# Patient Record
Sex: FEMALE | Race: WHITE | NOT HISPANIC OR LATINO | Employment: UNEMPLOYED | URBAN - NONMETROPOLITAN AREA
[De-identification: names, ages, dates, MRNs, and addresses within clinical notes are randomized per-mention and may not be internally consistent; named-entity substitution may affect disease eponyms.]

---

## 2017-01-19 ENCOUNTER — OFFICE VISIT (OUTPATIENT)
Dept: CARDIOLOGY | Facility: CLINIC | Age: 42
End: 2017-01-19

## 2017-01-19 VITALS
HEIGHT: 61 IN | OXYGEN SATURATION: 94 % | SYSTOLIC BLOOD PRESSURE: 143 MMHG | HEART RATE: 68 BPM | BODY MASS INDEX: 34.93 KG/M2 | DIASTOLIC BLOOD PRESSURE: 79 MMHG | WEIGHT: 185 LBS

## 2017-01-19 DIAGNOSIS — Z76.89 ENCOUNTER TO ESTABLISH CARE: ICD-10-CM

## 2017-01-19 DIAGNOSIS — I21.9 MYOCARDIAL INFARCTION IN RECOVERY PHASE (HCC): Primary | ICD-10-CM

## 2017-01-19 DIAGNOSIS — E78.2 MIXED HYPERLIPIDEMIA: ICD-10-CM

## 2017-01-19 DIAGNOSIS — E66.9 OBESITY (BMI 30-39.9): ICD-10-CM

## 2017-01-19 DIAGNOSIS — Z72.0 TOBACCO ABUSE: ICD-10-CM

## 2017-01-19 DIAGNOSIS — I25.10 CORONARY ARTERY DISEASE INVOLVING NATIVE CORONARY ARTERY OF NATIVE HEART WITHOUT ANGINA PECTORIS: ICD-10-CM

## 2017-01-19 DIAGNOSIS — R06.09 DYSPNEA ON EXERTION: ICD-10-CM

## 2017-01-19 PROCEDURE — 99214 OFFICE O/P EST MOD 30 MIN: CPT | Performed by: INTERNAL MEDICINE

## 2017-01-19 NOTE — MR AVS SNAPSHOT
Ekaterina May   1/19/2017 2:00 PM   Office Visit    Dept Phone:  [phone]   Encounter #:  [id number]    Provider:  Scott Aponte MD   Department:  [facility] CARDIOLOGY                Your Full Care Plan              Today's Medication Changes          These changes are accurate as of: 1/19/17  2:38 PM.  If you have any questions, ask your nurse or doctor.               Stop taking medication(s)listed here:     nicotine 14 MG/24HR patch   Commonly known as:  NICODERM CQ   Stopped by:  Scott Aponte MD           nicotine 21 MG/24HR patch   Commonly known as:  NICODERM CQ   Stopped by:  Scott Aponte MD           nicotine 7 MG/24HR patch   Commonly known as:  NICODERM CQ   Stopped by:  Scott Aponte MD           NICOTINE STEP 1 21 MG/24HR patch   Generic drug:  nicotine   Stopped by:  Scott Aponte MD                      Your Updated Medication List          This list is accurate as of: 1/19/17  2:38 PM.  Always use your most recent med list.                aspirin 325 MG EC tablet   Take 1 tablet by mouth Daily.       clopidogrel 75 MG tablet   Commonly known as:  PLAVIX   Take 1 tablet by mouth Daily.       lisinopril 2.5 MG tablet   Commonly known as:  PRINIVIL,ZESTRIL   Take 1 tablet by mouth Daily.       metoprolol succinate XL 25 MG 24 hr tablet   Commonly known as:  TOPROL-XL   Take 0.5 tablets by mouth Daily.       omeprazole 40 MG capsule   Commonly known as:  priLOSEC   Take 1 capsule by mouth Daily.               We Performed the Following     Ambulatory Referral to Cardiac Rehab       You Were Diagnosed With        Codes Comments    Myocardial infarction in recovery phase    -  Primary ICD-10-CM: I21.3  ICD-9-CM: 410.92     Dyspnea on exertion     ICD-10-CM: R06.09  ICD-9-CM: 786.09     Mixed hyperlipidemia     ICD-10-CM: E78.2  ICD-9-CM: 272.2       Instructions     None    Patient Instructions History      Upcoming Appointments     Visit  "Type Date Time Department    FOLLOW UP 1/19/2017  2:00 PM [facility]    FOLLOW UP 2/22/2017  3:00 PM [facility]      MyChart Signup     Our records indicate that you have an active [facility] account.    You can view your After Visit Summary by going to Thanx and logging in with your [employer] username and password.  If you don't have a [employer] username and password but a parent or guardian has access to your record, the parent or guardian should login with their own [employer] username and password and access your record to view the After Visit Summary.    If you have questions, you can email [email or web address] or call [phone] to talk to our [employer] staff.  Remember, [employer] is NOT to be used for urgent needs.  For medical emergencies, dial 911.               Other Info from Your Visit           Your Appointments     Feb 22, 2017  3:00 PM EST   Follow Up with Scott Aponte MD   [facility] (--)    [address]  [facility] [address] [phone]           Arrive 15 minutes prior to appointment.              Allergies     Codeine  Shortness Of Breath      Reason for Visit     Follow-up     Coronary Artery Disease           Vital Signs     Blood Pressure Pulse Height Weight Oxygen Saturation Body Mass Index    143/79 (BP Location: Right arm, Patient Position: Sitting) 68 61\" (154.9 cm) 185 lb (83.9 kg) 94% 34.96 kg/m2    Smoking Status                   Former Smoker           Problems and Diagnoses Noted     Mixed hyperlipidemia    Myocardial infarction in recovery phase    -  Primary    Breathlessness on exertion            "

## 2017-01-19 NOTE — PROGRESS NOTES
Subjective   Ekaterina Verdin is a 41 y.o. female.     Chief Complaint   Patient presents with   • Follow-up   • Coronary Artery Disease       History of Present Illness     Mrs. Verdin is a pleasant 41-year-old woman who presents for follow-up of known coronary artery disease. She presented in September 2016 with an acute inferior wall myocardial infarction and was taken emergently to the cardiac catheterization laboratory and underwent drug-eluting stent implantation at that time.  She did have an echocardiogram performed during her hospitalization which was normal.  She states that more recently she's had difficulty with anxiety and panic attacks.  She states that she feels much more emotionally labile.  She notes that when she is emotional she does have some discomfort in the upper portion of her abdomen.  She states that there may be some radiation to her left arm.  She notes that when she walks she has no symptoms at all.  She also describes that she is more short of breath with activity.  She also notes orthopnea and states that when she takes her 's Lasix she feels better.  She has gained nearly 20 pounds since her initial presentation.  She denies any palpitations, near syncope or syncopal symptoms.  She notes that she has not had any periods since her hospitalization.  She does not see a primary care provider.      The following portions of the patient's history were reviewed and updated as appropriate: allergies, current medications, past family history, past medical history, past social history, past surgical history and problem list.    Review of Systems   Constitutional: Positive for appetite change and fatigue. Negative for activity change.   HENT: Negative for congestion and tinnitus.    Eyes: Negative for visual disturbance.   Respiratory: Positive for shortness of breath. Negative for cough and chest tightness.    Cardiovascular: Negative for chest pain, palpitations and leg swelling.    Gastrointestinal: Negative for blood in stool, nausea and vomiting.   Endocrine: Positive for cold intolerance. Negative for heat intolerance and polyuria.   Genitourinary: Negative for dysuria.   Musculoskeletal: Positive for myalgias. Negative for neck pain.   Skin: Negative for rash.   Neurological: Negative for dizziness, syncope, weakness and light-headedness.   Hematological: Does not bruise/bleed easily.   Psychiatric/Behavioral: Positive for sleep disturbance. Negative for confusion.       Objective   Physical Exam   Constitutional: She is oriented to person, place, and time. She appears well-developed and well-nourished. No distress.   HENT:   Head: Normocephalic and atraumatic.   Nose: Nose normal.   Mouth/Throat: Oropharynx is clear and moist.   Eyes: Conjunctivae and EOM are normal. Right eye exhibits no discharge. Left eye exhibits no discharge. No scleral icterus.   Neck: Normal range of motion. No hepatojugular reflux and no JVD present. Carotid bruit is not present. No tracheal deviation present.   Cardiovascular: Normal rate, regular rhythm, S1 normal, S2 normal and intact distal pulses.  PMI is not displaced.  Exam reveals no gallop, no S3, no S4 and no friction rub.    No murmur heard.  Pulmonary/Chest: Effort normal and breath sounds normal. No accessory muscle usage. No respiratory distress. She has no wheezes. She has no rales. She exhibits no tenderness.   Abdominal: Soft. Bowel sounds are normal. She exhibits no distension. There is no tenderness.   Musculoskeletal: Normal range of motion. She exhibits no edema or tenderness.       Vascular Status -  Her exam exhibits no right foot edema. Her exam exhibits no left foot edema.  Neurological: She is alert and oriented to person, place, and time. No cranial nerve deficit. Coordination normal.   Skin: Skin is warm and dry. She is not diaphoretic.   Nursing note and vitals reviewed.      Procedures    Assessment/Plan     Coronary Artery Disease.  I suspect that overall the patient's coronary artery disease is stable.  I suspect that most of her symptoms are due to depression post myocardial infarction.  Again, I have encouraged her to participate in cardiac rehabilitation as I suspect this will be most beneficial to her.  As noted below, I have asked her to obtain laboratory data and an echocardiogram.  If all of this is unremarkable and she continues to have symptoms I will obtain a stress test.      Dyspnea  In regard to her complaint of dyspnea, her symptoms are somewhat unclear.  Specifically although she does describe shortness of breath her exam was relatively unremarkable and her echocardiogram during her hospitalization was also normal.  Again, I've asked her to obtain a BNP and an echocardiogram.     Hyperlipidemia In regard to their history of hyperlipidemia, she is tolerating statin therapy without difficulty. I will obtain a follow-up lipid panel.     Tobacco.  In regard to their history of tobacco consumption, I have congratulated her on discontinuing tobacco products. I've asked her to remain tobacco free.     Obesity. I spent a long time discussing the patient's weight and relevance to their cardiac risk profile. I encouraged them to work on weight loss through aerobic exercise and diet. I have discussed diet options to include weight watchers and the Mediterranean diet etc. I encouraged them to achieve a BMI of less than 25.     In short, it is been a pleasure to participate in Mrs. Barcenas care, I look forward to seeing her again in one month.

## 2017-01-25 ENCOUNTER — APPOINTMENT (OUTPATIENT)
Dept: LAB | Facility: HOSPITAL | Age: 42
End: 2017-01-25
Attending: INTERNAL MEDICINE

## 2017-01-25 ENCOUNTER — HOSPITAL ENCOUNTER (OUTPATIENT)
Dept: CARDIOLOGY | Facility: HOSPITAL | Age: 42
Discharge: HOME | End: 2017-01-25
Attending: INTERNAL MEDICINE | Admitting: INTERNAL MEDICINE

## 2017-01-25 DIAGNOSIS — E78.2 MIXED HYPERLIPIDEMIA: ICD-10-CM

## 2017-01-25 DIAGNOSIS — R06.09 DYSPNEA ON EXERTION: ICD-10-CM

## 2017-01-25 DIAGNOSIS — I21.9 MYOCARDIAL INFARCTION IN RECOVERY PHASE (HCC): ICD-10-CM

## 2017-01-25 LAB
ALBUMIN SERPL-MCNC: 4.3 G/DL (ref 3.5–5)
ALBUMIN/GLOB SERPL: 1.5 G/DL (ref 1.5–2.5)
ALP SERPL-CCNC: 54 U/L (ref 46–116)
ALT SERPL W P-5'-P-CCNC: 42 U/L (ref 10–36)
ANION GAP SERPL CALCULATED.3IONS-SCNC: 4.9 MMOL/L (ref 3.6–11.2)
AST SERPL-CCNC: 31 U/L (ref 10–30)
BASOPHILS # BLD AUTO: 0.02 10*3/MM3 (ref 0–0.3)
BASOPHILS NFR BLD AUTO: 0.3 % (ref 0–2)
BH CV ECHO MEAS - % IVS THICK: 39.4 %
BH CV ECHO MEAS - % LVPW THICK: 31.3 %
BH CV ECHO MEAS - ACS: 1.4 CM
BH CV ECHO MEAS - AO ROOT AREA (BSA CORRECTED): 1.5
BH CV ECHO MEAS - AO ROOT AREA: 6.4 CM^2
BH CV ECHO MEAS - AO ROOT DIAM: 2.9 CM
BH CV ECHO MEAS - BSA(HAYCOCK): 2 M^2
BH CV ECHO MEAS - BSA: 1.8 M^2
BH CV ECHO MEAS - BZI_BMI: 33.8 KILOGRAMS/M^2
BH CV ECHO MEAS - BZI_METRIC_HEIGHT: 157.5 CM
BH CV ECHO MEAS - BZI_METRIC_WEIGHT: 83.9 KG
BH CV ECHO MEAS - EDV(CUBED): 85.2 ML
BH CV ECHO MEAS - EDV(TEICH): 87.7 ML
BH CV ECHO MEAS - EF(CUBED): 71.5 %
BH CV ECHO MEAS - EF(TEICH): 63.4 %
BH CV ECHO MEAS - ESV(CUBED): 24.3 ML
BH CV ECHO MEAS - ESV(TEICH): 32.1 ML
BH CV ECHO MEAS - FS: 34.2 %
BH CV ECHO MEAS - IVS/LVPW: 1
BH CV ECHO MEAS - IVSD: 1.2 CM
BH CV ECHO MEAS - IVSS: 1.7 CM
BH CV ECHO MEAS - LA DIMENSION: 3 CM
BH CV ECHO MEAS - LA/AO: 1.1
BH CV ECHO MEAS - LV MASS(C)D: 189.4 GRAMS
BH CV ECHO MEAS - LV MASS(C)DI: 102.4 GRAMS/M^2
BH CV ECHO MEAS - LV MASS(C)S: 171.4 GRAMS
BH CV ECHO MEAS - LV MASS(C)SI: 92.7 GRAMS/M^2
BH CV ECHO MEAS - LVIDD: 4.4 CM
BH CV ECHO MEAS - LVIDS: 2.9 CM
BH CV ECHO MEAS - LVOT AREA (M): 2.5 CM^2
BH CV ECHO MEAS - LVOT AREA: 2.6 CM^2
BH CV ECHO MEAS - LVOT DIAM: 1.8 CM
BH CV ECHO MEAS - LVPWD: 1.2 CM
BH CV ECHO MEAS - LVPWS: 1.5 CM
BH CV ECHO MEAS - MV A MAX VEL: 64.7 CM/SEC
BH CV ECHO MEAS - MV DEC SLOPE: 356.8 CM/SEC^2
BH CV ECHO MEAS - MV E MAX VEL: 84.4 CM/SEC
BH CV ECHO MEAS - MV E/A: 1.3
BH CV ECHO MEAS - MV P1/2T MAX VEL: 82.9 CM/SEC
BH CV ECHO MEAS - MV P1/2T: 68.1 MSEC
BH CV ECHO MEAS - MVA P1/2T LCG: 2.7 CM^2
BH CV ECHO MEAS - MVA(P1/2T): 3.2 CM^2
BH CV ECHO MEAS - RAP SYSTOLE: 10 MMHG
BH CV ECHO MEAS - RVSP: 27.3 MMHG
BH CV ECHO MEAS - SI(CUBED): 32.9 ML/M^2
BH CV ECHO MEAS - SI(TEICH): 30 ML/M^2
BH CV ECHO MEAS - SV(CUBED): 60.9 ML
BH CV ECHO MEAS - SV(TEICH): 55.6 ML
BH CV ECHO MEAS - TR MAX VEL: 207.7 CM/SEC
BILIRUB SERPL-MCNC: 0.3 MG/DL (ref 0.2–1.8)
BNP SERPL-MCNC: 50 PG/ML (ref 0–100)
BUN BLD-MCNC: 8 MG/DL (ref 7–21)
BUN/CREAT SERPL: 9.1 (ref 7–25)
CALCIUM SPEC-SCNC: 9.2 MG/DL (ref 7.7–10)
CHLORIDE SERPL-SCNC: 107 MMOL/L (ref 99–112)
CHOLEST SERPL-MCNC: 148 MG/DL (ref 0–200)
CO2 SERPL-SCNC: 30.1 MMOL/L (ref 24.3–31.9)
CREAT BLD-MCNC: 0.88 MG/DL (ref 0.43–1.29)
DEPRECATED RDW RBC AUTO: 47.7 FL (ref 37–54)
EOSINOPHIL # BLD AUTO: 0.11 10*3/MM3 (ref 0–0.7)
EOSINOPHIL NFR BLD AUTO: 1.5 % (ref 0–5)
ERYTHROCYTE [DISTWIDTH] IN BLOOD BY AUTOMATED COUNT: 13.9 % (ref 11.5–14.5)
GFR SERPL CREATININE-BSD FRML MDRD: 70 ML/MIN/1.73
GLOBULIN UR ELPH-MCNC: 2.8 GM/DL
GLUCOSE BLD-MCNC: 81 MG/DL (ref 70–110)
HCG SERPL QL: NEGATIVE
HCT VFR BLD AUTO: 39.4 % (ref 37–47)
HDLC SERPL-MCNC: 38 MG/DL (ref 60–100)
HGB BLD-MCNC: 13.2 G/DL (ref 12–16)
IMM GRANULOCYTES # BLD: 0.02 10*3/MM3 (ref 0–0.03)
IMM GRANULOCYTES NFR BLD: 0.3 % (ref 0–0.5)
LDLC SERPL CALC-MCNC: 74 MG/DL (ref 0–100)
LDLC/HDLC SERPL: 1.94 {RATIO}
LV EF 2D ECHO EST: 60 %
LYMPHOCYTES # BLD AUTO: 2.45 10*3/MM3 (ref 1–3)
LYMPHOCYTES NFR BLD AUTO: 33.3 % (ref 21–51)
MCH RBC QN AUTO: 31.5 PG (ref 27–33)
MCHC RBC AUTO-ENTMCNC: 33.5 G/DL (ref 33–37)
MCV RBC AUTO: 94 FL (ref 80–94)
MONOCYTES # BLD AUTO: 0.85 10*3/MM3 (ref 0.1–0.9)
MONOCYTES NFR BLD AUTO: 11.5 % (ref 0–10)
NEUTROPHILS # BLD AUTO: 3.91 10*3/MM3 (ref 1.4–6.5)
NEUTROPHILS NFR BLD AUTO: 53.1 % (ref 30–70)
OSMOLALITY SERPL CALC.SUM OF ELEC: 280.5 MOSM/KG (ref 273–305)
PLATELET # BLD AUTO: 275 10*3/MM3 (ref 130–400)
PMV BLD AUTO: 11.6 FL (ref 6–10)
POTASSIUM BLD-SCNC: 4 MMOL/L (ref 3.5–5.3)
PROT SERPL-MCNC: 7.1 G/DL (ref 6–8)
RBC # BLD AUTO: 4.19 10*6/MM3 (ref 4.2–5.4)
SODIUM BLD-SCNC: 142 MMOL/L (ref 135–153)
TRIGL SERPL-MCNC: 182 MG/DL (ref 0–150)
VLDLC SERPL-MCNC: 36.4 MG/DL
WBC NRBC COR # BLD: 7.36 10*3/MM3 (ref 4.5–12.5)

## 2017-01-25 PROCEDURE — 93306 TTE W/DOPPLER COMPLETE: CPT

## 2017-01-25 PROCEDURE — 36415 COLL VENOUS BLD VENIPUNCTURE: CPT | Performed by: INTERNAL MEDICINE

## 2017-01-25 PROCEDURE — 85025 COMPLETE CBC W/AUTO DIFF WBC: CPT | Performed by: INTERNAL MEDICINE

## 2017-01-25 PROCEDURE — 93306 TTE W/DOPPLER COMPLETE: CPT | Performed by: INTERNAL MEDICINE

## 2017-01-25 PROCEDURE — 80053 COMPREHEN METABOLIC PANEL: CPT | Performed by: INTERNAL MEDICINE

## 2017-01-25 PROCEDURE — 83880 ASSAY OF NATRIURETIC PEPTIDE: CPT | Performed by: INTERNAL MEDICINE

## 2017-01-25 PROCEDURE — 80061 LIPID PANEL: CPT | Performed by: INTERNAL MEDICINE

## 2017-01-25 PROCEDURE — 84703 CHORIONIC GONADOTROPIN ASSAY: CPT | Performed by: INTERNAL MEDICINE

## 2017-01-30 ENCOUNTER — TELEPHONE (OUTPATIENT)
Dept: CARDIOLOGY | Facility: CLINIC | Age: 42
End: 2017-01-30

## 2017-01-30 NOTE — TELEPHONE ENCOUNTER
----- Message from Scott Aponte MD sent at 1/26/2017  3:23 PM EST -----  Let her know her labs and cholesterol tests were OK.  COntinue current meds and work on weight loss    sdf      CALLED PT TO LET HER KNOW THAT PER DR. APONTE HER ECHO WAS OK, & THAT HER LABS AND CHOLESTEROL TEST WERE OK TO CONTINUE CURRENT MEDS AS IS & WORK ON WEIGHT LOSS.    PT AWARE & UNDERSTANDS.     CSMA

## 2017-02-01 ENCOUNTER — OFFICE VISIT (OUTPATIENT)
Dept: FAMILY MEDICINE CLINIC | Facility: CLINIC | Age: 42
End: 2017-02-01

## 2017-02-01 VITALS
HEART RATE: 90 BPM | OXYGEN SATURATION: 97 % | HEIGHT: 61 IN | BODY MASS INDEX: 34.93 KG/M2 | WEIGHT: 185 LBS | SYSTOLIC BLOOD PRESSURE: 130 MMHG | TEMPERATURE: 98.7 F | DIASTOLIC BLOOD PRESSURE: 60 MMHG

## 2017-02-01 DIAGNOSIS — H61.23 EXCESSIVE CERUMEN IN EAR CANAL, BILATERAL: ICD-10-CM

## 2017-02-01 DIAGNOSIS — E66.9 OBESITY (BMI 30-39.9): ICD-10-CM

## 2017-02-01 DIAGNOSIS — E28.2 PCOS (POLYCYSTIC OVARIAN SYNDROME): ICD-10-CM

## 2017-02-01 DIAGNOSIS — E78.2 MIXED HYPERLIPIDEMIA: ICD-10-CM

## 2017-02-01 DIAGNOSIS — I25.10 CORONARY ARTERY DISEASE INVOLVING NATIVE CORONARY ARTERY OF NATIVE HEART WITHOUT ANGINA PECTORIS: Primary | ICD-10-CM

## 2017-02-01 PROCEDURE — 69210 REMOVE IMPACTED EAR WAX UNI: CPT | Performed by: NURSE PRACTITIONER

## 2017-02-01 PROCEDURE — 99204 OFFICE O/P NEW MOD 45 MIN: CPT | Performed by: NURSE PRACTITIONER

## 2017-02-01 RX ORDER — FUROSEMIDE 20 MG/1
20 TABLET ORAL DAILY PRN
Qty: 30 TABLET | Refills: 5 | Status: SHIPPED | OUTPATIENT
Start: 2017-02-01 | End: 2017-06-15 | Stop reason: SDUPTHER

## 2017-02-01 RX ORDER — BUPROPION HYDROCHLORIDE 150 MG/1
150 TABLET ORAL DAILY
Qty: 30 TABLET | Refills: 3 | Status: SHIPPED | OUTPATIENT
Start: 2017-02-01 | End: 2017-05-30 | Stop reason: SDUPTHER

## 2017-02-01 RX ORDER — POTASSIUM CHLORIDE 750 MG/1
10 TABLET, EXTENDED RELEASE ORAL DAILY
Qty: 30 TABLET | Refills: 5 | Status: SHIPPED | OUTPATIENT
Start: 2017-02-01 | End: 2017-06-15 | Stop reason: SDUPTHER

## 2017-02-01 RX ORDER — ATORVASTATIN CALCIUM 80 MG/1
80 TABLET, FILM COATED ORAL DAILY
COMMUNITY
End: 2017-06-15 | Stop reason: SDUPTHER

## 2017-02-01 NOTE — PROGRESS NOTES
Subjective   Ekaterina Verdin is a 41 y.o. female.     History of Present Illness       History of MI  2016 with two stents. She is under the care of Dr. Aponte at [facility]. Recently stopped smoking. Weight gain after smoke cessation of 18 pounds.     Depression/Anxiety  Reports feeling different following her heart attack. Feels like she is always fearful of having another heart attack. At times she feels anxious and shaky.  with two adult children. Disabled. Her  is a local .   Feels as if part of herself  the day she had the heart attack and that she will never be the same person.     Hyperlipidemia  Triglycerides were over 500 prior to her heart attack. Recent labs in chart with great improvement.      Ref Range & Units 7d ago     Total Cholesterol 0 - 200 mg/dL 148   Triglycerides 0 - 150 mg/dL 182 (H)   HDL Cholesterol 60 - 100 mg/dL 38 (L)   LDL Cholesterol  0 - 100 mg/dL 74   VLDL Cholesterol mg/dL 36.4   LDL/HDL Ratio  1.94   Resulting Agency  [facility]     PCOS  History of pcos. Ablation several years ago. No menstrual flow since time of MI. No recent pap or gyn consult. Does have some male pattern hair growth.       The following portions of the patient's history were reviewed and updated as appropriate: allergies, current medications, past family history, past medical history, past social history, past surgical history and problem list.    Review of Systems   Constitutional: Negative for activity change, appetite change, chills, fatigue and fever.   HENT: Negative for ear pain, facial swelling, hearing loss, sinus pressure, sore throat, trouble swallowing and voice change.    Eyes: Negative for pain, discharge and visual disturbance.   Respiratory: Positive for shortness of breath (random times, resolves when she takes a lasix ). Negative for apnea, cough, choking, chest tightness and wheezing.    Cardiovascular: Positive for leg swelling (feet swell at  times, she has taken lasix of her husbands in the past which helps). Negative for chest pain and palpitations.   Gastrointestinal: Negative for abdominal pain, blood in stool, constipation, diarrhea, nausea and vomiting.        Gerd is stable at this time    Endocrine: Negative.    Genitourinary: Negative for dysuria.   Musculoskeletal: Negative for arthralgias and neck stiffness.   Skin: Negative for color change.   Allergic/Immunologic: Positive for environmental allergies. Negative for food allergies and immunocompromised state.   Neurological: Negative for headaches.   Hematological: Negative for adenopathy. Does not bruise/bleed easily.   Psychiatric/Behavioral: Positive for decreased concentration. Negative for confusion and suicidal ideas. The patient is nervous/anxious.    All other systems reviewed and are negative.      Objective   Physical Exam   Constitutional: She is oriented to person, place, and time. She appears well-developed and well-nourished. No distress.   HENT:   Head: Normocephalic.   Right Ear: Hearing, tympanic membrane and external ear normal.   Left Ear: Hearing, tympanic membrane and external ear normal.   Nose: Nose normal.   Mouth/Throat: Oropharynx is clear and moist.   Bilateral ear canals impacted with cerumen, removed via provider with flexi-loop. Honey colored cerumen removed, tolerated well.      Eyes: Pupils are equal, round, and reactive to light.   Neck: Normal range of motion. Neck supple. No tracheal deviation present. No thyromegaly present.   Cardiovascular: Normal rate, regular rhythm and normal heart sounds.  Exam reveals no gallop and no friction rub.    No murmur heard.  Pulmonary/Chest: Effort normal and breath sounds normal. No respiratory distress. She has no wheezes. She has no rales. She exhibits no tenderness.   Abdominal: Soft. Bowel sounds are normal. She exhibits no distension and no mass. There is no tenderness. There is no rebound and no guarding.  "  Musculoskeletal: Normal range of motion.   Neurological: She is alert and oriented to person, place, and time. She has normal reflexes.   CN 2-12 grossly intact    Skin: Skin is warm and dry. No rash noted. She is not diaphoretic. No erythema.   Psychiatric: She has a normal mood and affect. Her speech is normal and behavior is normal. Judgment and thought content normal. Cognition and memory are normal.   Vitals reviewed.    Visit Vitals   • /60 (BP Location: Right arm, Patient Position: Sitting, Cuff Size: Adult)   • Pulse 90   • Temp 98.7 °F (37.1 °C) (Oral)   • Ht 61\" (154.9 cm)   • Wt 185 lb (83.9 kg)   • LMP 09/01/2016   • SpO2 97%   • BMI 34.96 kg/m2         Assessment/Plan   Ekaterina was seen today to establish care.     Diagnoses and all orders for this visit:    Coronary artery disease involving native coronary artery of native heart without angina pectoris  Under the care of cardiology    furosemide (LASIX) 20 MG tablet; Take 1 tablet by mouth Daily As Needed (swelling).  -     potassium chloride (K-DUR,KLOR-CON) 10 MEQ CR tablet; Take 1 tablet by mouth Daily. Take only when take lasix    Mixed hyperlipidemia  Recent labs in chart and discussed    Obesity (BMI 30-39.9)  Weight loss and heart healthy diet reviewed     PCOS (polycystic ovarian syndrome)  -     Ambulatory Referral to Gynecology    Excessive cerumen in ear canal, bilateral  Bilateral ear canals impacted with cerumen, removed via provider with flexi-loop. Honey colored cerumen removed, tolerated well.     Anxiety/ Depression  -     buPROPion XL (WELLBUTRIN XL) 150 MG 24 hr tablet; Take 1 tablet by mouth Daily.   Emotional support and active listening provided.    Refer to LCSW,       Follow up in six weeks, sooner if needed.   Will start Wellbutrin at this time as it may help with her craving for cigarettes , anxiety and lower weight gain risk.  I have discussed diagnosis in detail today allowing time for questions and answers. Pt is " aware of reasons to seek urgent or emergent medical care as well as reasons to return to the clinic for evaluation. Possible side effects, interactions and progression of symptoms discussed as well. Pt / family states understanding.   Encouraged Ekaterina to volunteer two days a week at a community site such as Library, nursing home or gift shop at the Hospital. Take up a hobby to occupy her time and mind.  Heart healthy diet and exercise as tolerated, encouraged her to walk at a leisure pace to begin then gradually increase her time and pace as tolerated.   Praised pt for smoke cessation. Encouraged weight loss.   Record and labs reviewed.   Refer to gyn for consult.

## 2017-02-14 ENCOUNTER — TREATMENT (OUTPATIENT)
Dept: CARDIAC REHAB | Facility: HOSPITAL | Age: 42
End: 2017-02-14

## 2017-02-14 VITALS — HEIGHT: 62 IN

## 2017-02-14 DIAGNOSIS — Z95.5 STENTED CORONARY ARTERY: Primary | ICD-10-CM

## 2017-02-14 PROCEDURE — 93798 PHYS/QHP OP CAR RHAB W/ECG: CPT

## 2017-02-14 NOTE — PROGRESS NOTES
Cardiac Rehab Initial Assessment      Name: Ekaterina Verdin  :1975 Allergies:Codeine   MRN: [id number] 42 y.o. Physician: ILEANA Angel   Primary Diagnosis:    Diagnosis Plan   1. Stented coronary artery     Stent in RCA  Event Date: 2016 Specialist: Dr. Aponte    Secondary Diagnosis: STEMI  Risk Stratification:Moderate Risk Note Author: Ayse Falk RN     Cardiovascular History: Previous MI and Previous PCI     EXERCISE AT HOME  no    N/A    EF: 60%      Source: 2017          Ambulatory Status:Independent  Ambulatory Fall Risk Assessed on Initial Visit: yes 6 Minute Walk Pre- Cardiac Rehab:  Distance:1248ft      RPE:13  Max. HR: 120       SPO2:97    MET: 2.8  MPH: NA             RPD: NA  Resting BP: 130/84 LA, 144/84 RA    Peak BP: 150/86  Recovery BP: 136/76  Comments: instructed pt to monitor BP BID and document on flow sheet and return to CR at next visit.      NUTRITION  Lipids:yes If yes, labs as follows;  Total: No components found for: CHOLESTEROL  HDL:   HDL CHOLESTEROL   Date Value Ref Range Status   2017 38 (L) 60 - 100 mg/dL Final    Lipids continued:  LDL:No results found for: LDL  Triglyceride: No components found for: TRIGLYCERIDE   Weight Management:                 Weight: 186.4  Height: 62                                   BMI: There is no height or weight on file to calculate BMI.  Waist Circumference: na  inches   Alcohol Use: none Diabetes:No    Last HGBA1C with date if applicable:No components found for: A1C         SOCIAL HISTORY  Social History     Social History   • Marital status:      Spouse name: N/A   • Number of children: N/A   • Years of education: N/A     Social History Main Topics   • Smoking status: Former Smoker     Packs/day: 1.00     Years: 23.00     Types: Cigarettes     Quit date: 2016   • Smokeless tobacco: Never Used   • Alcohol use No   • Drug use: No   • Sexual activity: Defer     Other Topics Concern   • None  "    Social History Narrative       Educational Level (choose one that applies) 11th grade Learning Barriers:[employer], [employer]    Family Support:yes    Living Arrangement: lives with their spouse    Risk Factors: Stress  Yes, Heredity  Yes If Yes Mother had MI at 47, Father has 6 stents, maternal grand parents and aunts/uncles , Hyperlipidemia  Yes, Exercise prior to event  No If Yes: Activity NA , Minutes per day0, Days per week 0 and Obesity  Yes     Tobacco Adjunct: No        Comorbidities: Previous MI     PSYCHOSOCIAL  Clinical Depression: yes    Stress: yes     Assess presence or absence of depression using a valid screening tool: yes      PHYSICAL ASSESSMENT  Influenza vaccine: yes  Pneumococcal vaccine: no          Angina: no    Describe angina scale of 0 - 4: 0 = none    Today are you having incisional pain? N/A. If, Yes, Scale: na        Today are you having any other pain? No. If, Yes, Scale: 0     Diagnosed with Hypertension:yes    Heart Sounds: S1 S2 no S3 S4      Lung Sounds: normal air entry, lungs clear to auscultation         Assessment: Pt tolerated 6MWT well, NAD noted, no c/o's voiced, skin w/p/d, resp nl and even, denies chest discomfort, monitor shows NSR-ST.      Pt educated on warm up, stretching, use of RPE scale, application of heart monitor, home exercise and exercise guidelines.  Cleaning and use of equipment, s/s to report. TBV.     Orthopedic Problems: \"Weak ankles and tennis elbow\" in LUE    Are you being hurt, hit, or frightened by anyone at home or in your life? no    Are you being neglected by a caregiver? No Shoulder flexibility/Range of motion: Average     Recommended arm activity: Any    Chair sit and reach within: na   Leg flexibility: Average    Leg Strength/Balance/Five times sit to stand: NA     Chose one: Fall Risk    Recommended stretching: Standing    Assessment: SEE ABOVE     Family attends IA: yes Time of arrival: 1410  Time of departure: 1600     Patient Goals: Begin " personalized exercise program: 3-4 days per week to begin, then build up to 5-6 days per week.  Loose 1-2 lbs weekly.  Improve mental health by meeting with a counselor and following rx mediation regimen.          2/14/2017  2:32 PM  Ayse Falk RN

## 2017-02-16 ENCOUNTER — OFFICE VISIT (OUTPATIENT)
Dept: PSYCHIATRY | Facility: CLINIC | Age: 42
End: 2017-02-16

## 2017-02-16 DIAGNOSIS — F06.31 MOOD DISORDER WITH DEPRESSIVE FEATURES DUE TO GENERAL MEDICAL CONDITION: ICD-10-CM

## 2017-02-16 DIAGNOSIS — F41.0 PANIC DISORDER: Primary | ICD-10-CM

## 2017-02-16 PROCEDURE — 90791 PSYCH DIAGNOSTIC EVALUATION: CPT | Performed by: SOCIAL WORKER

## 2017-02-16 NOTE — PROGRESS NOTES
"IDENTIFYING INFORMATION:   The patient is a 42 y.o. female who is here today for initial appointment.  The patient continues to live with her  and children in [facility].  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    CHIEF COMPLIANT:  \"I've been struggling since I had a heart attack in September 2016\".    HPI: Patient reports she had a heart attack in September 2016 due to 100% blockage more her arteries and states following her surgery and discharge from the hospital she began experiencing extreme fear and continues to have a sense of an adrenaline rush in the afternoons when she has idle time.  She reports she begins to feel tingling sensation, feels on edge, has increased heart rate, feels she must get up and walk around, since of tingling, feels very tense, has a sensation of electric shock, and is very jittery.  Patient reports this comes on without warning and states she doesn't feel as though she is worried about anything.  She also reports this last for quite some time until she becomes extremely exhausted and eventually goes to bed and falls asleep.  She also reports ever since her medical scare she has had a sense of fear of being alone and continues to avoid going out alone or being alone with her children or grandchildren out of the fear something will happen to her which would keep her from being able to care for them.  In fact, she states her mother plans to accompany her to the grocery store immediately following this session.  Patient also reports intermediate periods of feeling depressed, irritable, feelings of hopelessness, feeling lonely, and having thoughts of why did this happen to her.  She states these periods only last 1-2 days and states she is usually able to \"pull myself out of them\".  Patient also reports she has been a smoker for many years but states the last day she smoked was the day she had her heart attack.  She does report her  " continues to smoke in the home on a daily basis.      PAST PSYCHIATRIC HISTORY: The patient has been prescribed Wellbutrin by ILEANA James in this clinic.  Patient reports no other psychiatric treatment, no psychiatric hospitalizations, no suicidal ideation or suicide attempts.      SUBSTANCE ABUSE HISTORY: None reported      MEDICAL HISTORY: She reports she has essential tremors which she was told are genetic which led to her receiving her disability benefits several years ago.  History of acute myocardial infarction September 2016.  Coronary artery disease.  Hyperlipidemia      CURRENT MEDICATIONS:  Current Outpatient Prescriptions   Medication Sig Dispense Refill   • aspirin  MG EC tablet Take 1 tablet by mouth Daily. 30 tablet 5   • atorvastatin (LIPITOR) 80 MG tablet Take 80 mg by mouth Daily.     • buPROPion XL (WELLBUTRIN XL) 150 MG 24 hr tablet Take 1 tablet by mouth Daily. 30 tablet 3   • clopidogrel (PLAVIX) 75 MG tablet Take 1 tablet by mouth Daily. 30 tablet 5   • furosemide (LASIX) 20 MG tablet Take 1 tablet by mouth Daily As Needed (swelling). 30 tablet 5   • lisinopril (PRINIVIL,ZESTRIL) 2.5 MG tablet Take 1 tablet by mouth Daily. 30 tablet 5   • metoprolol succinate XL (TOPROL-XL) 25 MG 24 hr tablet Take 0.5 tablets by mouth Daily. 30 tablet 11   • omeprazole (PriLOSEC) 40 MG capsule Take 1 capsule by mouth Daily. 30 capsule 5   • potassium chloride (K-DUR,KLOR-CON) 10 MEQ CR tablet Take 1 tablet by mouth Daily. Take only when take lasix 30 tablet 5     No current facility-administered medications for this visit.          FAMILY HISTORY: She reports her , son, and daughter have severe anxiety.  She reports no other positive family history of mental illness or substance use.      SOCIAL HISTORY: Patient reports she grew up with her family of origin and [facility] and states she had a good childhood with no abuse or domestic violence.  Patient is  to her childhood  [place] with 2 children and states her  is her best friend.  Patient reports she is very spiritual person and attends [employer].  The patient has received disability benefits since 2005 for essential tremors.  He reports she has a good relationship with her extended family members and states they are positive source of support.  Patient does not have an arrest record and has not been in the .  Patient reports she is sexually active and considers herself heterosexual.      MENTAL STATUS EXAM:   Hygiene:   good  Cooperation:  Cooperative  Eye Contact:  Good  Psychomotor Behavior:  Appropriate  Affect:  Appropriate  Hopelessness: Denies  Speech:  Normal  Thought Process:  Goal directed  Thought Content:  Normal  Suicidal:  None  Homicidal:  None  Hallucinations:  None  Delusion:  None  Memory:  Intact  Orientation:  Person, Place, Time and Situation  Reliability:  good  Insight:  Fair  Judgement:  Fair  Impulse Control:  Good  Physical/Medical Issues:  Yes Coronary artery disease; myocardial infarction September 2016    PROBLEM LIST:   Anxiety, depression,     STRENGTHS:   Willingness to seek treatment, positive support system, stable housing, sense of responsibility to family    WEAKNESSES:  Continued worry about health issues, poor coping skills, ongoing panic attacks      SHORT-TERM GOALS: Patient will be compliant with clinic appointments.  She will complete Kumar Depression Inventory emergency anxiety inventory and return next session.  Patient will begin utilizing relaxation techniques including deep breathing exercises and meditation to decrease severity and frequency of symptoms.  Patient will maintain stability and avoid higher level of care.    LONG-TERM GOALS: Patient will have cessation of symptoms and be able to function at optimal levels without continued treatment.     PLAN:   Patient will continue in monthly individual outpatient psychotherapy sessions at [facility]  [place] every 2-3 weeks and pharmacotherapy with ILEANA James as scheduled.     The patient was instructed to contact the clinic, call 911, or present to the nearest emergency room if crisis occurs.       Sreedhar Darnell LCSW, VIVIANE

## 2017-02-17 ENCOUNTER — TREATMENT (OUTPATIENT)
Dept: CARDIAC REHAB | Facility: HOSPITAL | Age: 42
End: 2017-02-17

## 2017-02-17 VITALS — DIASTOLIC BLOOD PRESSURE: 88 MMHG | HEART RATE: 61 BPM | SYSTOLIC BLOOD PRESSURE: 140 MMHG

## 2017-02-17 DIAGNOSIS — Z95.5 STENTED CORONARY ARTERY: Primary | ICD-10-CM

## 2017-02-17 PROCEDURE — 93798 PHYS/QHP OP CAR RHAB W/ECG: CPT

## 2017-02-17 NOTE — PROGRESS NOTES
Pt attended phase II visit.  Tolerated exercise well, NAD noted, no c/o's voiced, skin w/p/d, resp nl and even, denies chest discomfort,see [place] documentation for exercise data.  Dr. Mondragon physician immediately available.  V/S ALECIAL

## 2017-02-17 NOTE — PATIENT INSTRUCTIONS
Pre-Exercise Meal  Eating the right foods and drinking fluids before exercising or physical activity can give you more energy and can help your muscles recover afterward. Your pre-exercise meal depends on your personal needs. Think about the length of time you will be exercising and any health conditions you have that may guide what you can and cannot eat. The number of calories in your pre-exercise meal should reflect the amount of time between the meal and your training session or performance. The earlier your meal, the more calories it should contain. If your pre-exercise meal does not have enough calories, you may become hungry, and your performance may decline.   WHEN SHOULD I EAT MY PRE-EXERCISE MEAL?  The best time to eat a pre-exercise meal will vary depending on your personal needs and the timing of the exercise. Again, the size of the meal should reflect the amount of time before your training session or performance begins. In general, you should eat your pre-exercise meal 3-4 hours before the exercise session. Within 30-60 minutes of the session, you may want to limit what you consume to fluids (such as water or sports drinks) or easily digestible carbohydrates (such as sports gels, fruit, or fruit juice).  WHAT SHOULD MY PRE-EXERCISE MEAL INCLUDE?  Your pre-exercise meal should include:  · Carbohydrates. Carbohydrates should be the primary type of food in your meal. Some examples of carbohydrate-rich meals include:      Pasta.    A bagel or toast with peanut butter and honey.    A turkey and Swiss cheese sandwich with fruit and skim milk.    A low-fat tuna melt sandwich with a fruit cup.    Oatmeal with fruit and almonds and skim milk.  · Fluids. Drink enough fluids to keep you well hydrated. Water, sports drinks, and juice are good choices.  · Lean protein. Include moderate amounts of lean protein foods. Examples of these foods include fish, poultry (such as chicken or turkey), and lean meat (such as  pork loin).    · Foods that you are familiar with and tolerate well.  WHAT SHOULD MY PRE-EXERCISE MEAL NOT INCLUDE?  Your pre-exercise meal should not include:  · Carbonated drinks, such as soda and sparkling water. These may cause stomach discomfort.  · Foods that are high in sugar. These foods may cause your blood sugar levels to quickly rise, followed by a rapid drop to very low levels. This rapid drop to low levels can leave you feeling tired, light-headed, anxious, or irritable. As a result, it can interfere with your exercise routine or athletic performance.  · Salty foods, such as chips, crackers, or soups. These foods can increase your thirst during exercise.  · Foods that are high in fiber, such as bran products, dried beans or legumes, and vegetables such as onions, cauliflower, or cabbage. These types of foods can cause bloating or gas.  · Foods that are high in fat, including fried foods. Because you cannot digest fat as quickly, it may cause bloating and stomach discomfort.  · Caffeinated drinks, if caffeine affects you negatively. For some people, caffeine can cause nausea and anxiousness. Drinking a large amount of caffeine can lead to dehydration, because it causes your body to lose water.     This information is not intended to replace advice given to you by your health care provider. Make sure you discuss any questions you have with your health care provider.     Document Released: 12/18/2006 Document Revised: 01/08/2016 Document Reviewed: 05/19/2015  [employer] Interactive Patient Education ©2016 [employer]  Stress and Stress Management  Stress is a normal reaction to life events. It is what you feel when life demands more than you are used to or more than you can handle. Some stress can be useful. For example, the stress reaction can help you catch the last bus of the day, study for a test, or meet a deadline at work. But stress that occurs too often or for too long can cause problems. It can  affect your emotional health and interfere with relationships and normal daily activities. Too much stress can weaken your immune system and increase your risk for physical illness. If you already have a medical problem, stress can make it worse.  CAUSES   All sorts of life events may cause stress. An event that causes stress for one person may not be stressful for another person. Major life events commonly cause stress. These may be positive or negative. Examples include losing your job, moving into a new home, getting , having a baby, or losing a loved one. Less obvious life events may also cause stress, especially if they occur day after day or in combination. Examples include working long hours, driving in traffic, caring for children, being in debt, or being in a difficult relationship.  SIGNS AND SYMPTOMS  Stress may cause emotional symptoms including, the following:  · Anxiety. This is feeling worried, afraid, on edge, overwhelmed, or out of control.  · Anger. This is feeling irritated or impatient.  · Depression. This is feeling sad, down, helpless, or guilty.  · Difficulty focusing, remembering, or making decisions.  Stress may cause physical symptoms, including the following:   · Aches and pains. These may affect your head, neck, back, stomach, or other areas of your body.  · Tight muscles or clenched jaw.  · Low energy or trouble sleeping.   Stress may cause unhealthy behaviors, including the following:   · Eating to feel better (overeating) or skipping meals.  · Sleeping too little, too much, or both.  · Working too much or putting off tasks (procrastination).  · Smoking, drinking alcohol, or using drugs to feel better.  DIAGNOSIS   Stress is diagnosed through an assessment by your health care provider. Your health care provider will ask questions about your symptoms and any stressful life events. Your health care provider will also ask about your medical history and may order blood tests or other  "tests. Certain medical conditions and medicine can cause physical symptoms similar to stress.  Mental illness can cause emotional symptoms and unhealthy behaviors similar to stress. Your health care provider may refer you to a mental health professional for further evaluation.   TREATMENT   Stress management is the recommended treatment for stress. The goals of stress management are reducing stressful life events and coping with stress in healthy ways.   Techniques for reducing stressful life events include the following:  · Stress identification. Self-monitor for stress and identify what causes stress for you. These skills may help you to avoid some stressful events.  · Time management. Set your priorities, keep a calendar of events, and learn to say \"no.\" These tools can help you avoid making too many commitments.  Techniques for coping with stress include the following:  · Rethinking the problem. Try to think realistically about stressful events rather than ignoring them or overreacting. Try to find the positives in a stressful situation rather than focusing on the negatives.  · Exercise. Physical exercise can release both physical and emotional tension. The key is to find a form of exercise you enjoy and do it regularly.  · Relaxation techniques. These relax the body and mind. Examples include yoga, meditation, carlyle chi, biofeedback, deep breathing, progressive muscle relaxation, listening to music, being out in nature, journaling, and other hobbies. Again, the key is to find one or more that you enjoy and can do regularly.  · Healthy lifestyle. Eat a balanced diet, get plenty of sleep, and do not smoke. Avoid using alcohol or drugs to relax.  · Strong support network. Spend time with family, friends, or other people you enjoy being around. Express your feelings and talk things over with someone you trust.  Counseling or talk therapy with a mental health professional may be helpful if you are having difficulty " managing stress on your own. Medicine is typically not recommended for the treatment of stress. Talk to your health care provider if you think you need medicine for symptoms of stress.  HOME CARE INSTRUCTIONS  · Keep all follow-up visits as directed by your health care provider.  · Take all medicines as directed by your health care provider.  SEEK MEDICAL CARE IF:  · Your symptoms get worse or you start having new symptoms.  · You feel overwhelmed by your problems and can no longer manage them on your own.  SEEK IMMEDIATE MEDICAL CARE IF:  · You feel like hurting yourself or someone else.     This information is not intended to replace advice given to you by your health care provider. Make sure you discuss any questions you have with your health care provider.     Document Released: 06/13/2002 Document Revised: 01/08/2016 Document Reviewed: 08/12/2014  [employer] Interactive Patient Education ©2016 [employer]  Generalized Anxiety Disorder  Generalized anxiety disorder (NENA) is a mental disorder. It interferes with life functions, including relationships, work, and school.  NENA is different from normal anxiety, which everyone experiences at some point in their lives in response to specific life events and activities. Normal anxiety actually helps us prepare for and get through these life events and activities. Normal anxiety goes away after the event or activity is over.   NENA causes anxiety that is not necessarily related to specific events or activities. It also causes excess anxiety in proportion to specific events or activities. The anxiety associated with NENA is also difficult to control. NENA can vary from mild to severe. People with severe NENA can have intense waves of anxiety with physical symptoms (panic attacks).   SYMPTOMS  The anxiety and worry associated with NENA are difficult to control. This anxiety and worry are related to many life events and activities and also occur more days than not for 6 months  or longer. People with NENA also have three or more of the following symptoms (one or more in children):  · Restlessness.    · Fatigue.  · Difficulty concentrating.    · Irritability.  · Muscle tension.  · Difficulty sleeping or unsatisfying sleep.  DIAGNOSIS  NENA is diagnosed through an assessment by your health care provider. Your health care provider will ask you questions about your mood, physical symptoms, and events in your life. Your health care provider may ask you about your medical history and use of alcohol or drugs, including prescription medicines. Your health care provider may also do a physical exam and blood tests. Certain medical conditions and the use of certain substances can cause symptoms similar to those associated with NENA. Your health care provider may refer you to a mental health specialist for further evaluation.  TREATMENT  The following therapies are usually used to treat NENA:   · Medication. Antidepressant medication usually is prescribed for long-term daily control. Antianxiety medicines may be added in severe cases, especially when panic attacks occur.    · Talk therapy (psychotherapy). Certain types of talk therapy can be helpful in treating NENA by providing support, education, and guidance. A form of talk therapy called cognitive behavioral therapy can teach you healthy ways to think about and react to daily life events and activities.  · Stress management techniques. These include yoga, meditation, and exercise and can be very helpful when they are practiced regularly.  A mental health specialist can help determine which treatment is best for you. Some people see improvement with one therapy. However, other people require a combination of therapies.     This information is not intended to replace advice given to you by your health care provider. Make sure you discuss any questions you have with your health care provider.     Document Released: 04/14/2014 Document Revised: 01/08/2016  Document Reviewed: 04/14/2014  [employer] Interactive Patient Education ©2016 [employer]    Depression, Adult  Depression is feeling sad, low, down in the dumps, blue, gloomy, or empty. In general, there are two kinds of depression:  · Normal sadness or grief. This can happen after something upsetting. It often goes away on its own within 2 weeks. After losing a loved one (bereavement), normal sadness and grief may last longer than two weeks. It usually gets better with time.  · Clinical depression. This kind lasts longer than normal sadness or grief. It keeps you from doing the things you normally do in life. It is often hard to function at home, work, or at school. It may affect your relationships with others. Treatment is often needed.  GET HELP RIGHT AWAY IF:  · You have thoughts about hurting yourself or others.  · You lose touch with reality (psychotic symptoms). You may:    See or hear things that are not real.    Have untrue beliefs about your life or people around you.  · Your medicine is giving you problems.  MAKE SURE YOU:  · Understand these instructions.  · Will watch your condition.  · Will get help right away if you are not doing well or get worse.     This information is not intended to replace advice given to you by your health care provider. Make sure you discuss any questions you have with your health care provider.     Document Released: 01/20/2012 Document Revised: 01/08/2016 Document Reviewed: 04/18/2013  [employer] Patient Education ©2016 [employer].  Exercise Guidelines During Cardiac Rehabilitation  During cardiac rehabilitation, it is important to do more aerobic activities. Even simple lifestyle changes can help, such as parking farther from the store or taking the stairs instead of the elevator.   Discuss an appropriate exercise program with your cardiologist and rehabilitation therapist. It is important to design a program that is safe and effective for you. The program  should meet your specific abilities and needs. Walking, biking, jogging, and swimming are all good aerobic activities. These take light to moderate effort. Adding some light resistance training is also important.   STRETCHING  Stretching before you exercise warms up your muscles and prevents injury. Stretching also improves your flexibility, balance, coordination, and range of motion.  · Stretch both before and after exercising.  · Do not force a muscle or joint into a painful angle. Stretching should be a relaxing part of your exercise routine.  · As soon as you feel resistance, hold the position and count to 10.  · Go slowly when doing all stretches.  TYPES OF EXERCISE  Aerobic Exercise  Aerobic exercise keeps joints and muscles moving. It involves large muscle groups. It is also rhythmic in nature and done for a longer period. Doing these exercises improves circulation and endurance. Examples of aerobic exercise include:  · Swimming.  · Walking.  · Hiking.  · Jogging.  · Cross-country skiing.  · Biking.  Static Exercise  Static exercise uses muscles at high intensities without moving the joints. Pushing against a heavy couch that does not move is an example of static exercise. Static exercise improves strength but also quickly increases blood pressure. Therefore, you need to follow these guidelines:  · Do not hold your breath while doing static exercises. Holding your breath during static exercises can raise your blood pressure to a dangerously high level.  · Do not do static exercises if you have circulation problems or high blood pressure.  Weight-Resistance Exercise  Weight-resistance exercises are another important part of rehabilitation. These exercises strengthen your muscles by making them work against resistance. Examples include using free weights, weight-lifting machines, and large, specially designed rubber bands. Resistance exercises may help you return to activities of daily living sooner and improve  your quality of life. They also help reduce cardiac risk factors. You will usually do weight-resistance exercises twice a week with a 2-day rest period between workouts.  SETTING A PACE   · Choose a pace that is comfortable for you. You should be able to talk with a friend while exercising. Slow down if you are short of breath or unable to speak while you exercise.  · Keep track of how hard you are working as you exercise. Your rehabilitation therapist can teach you to use a scale to measure your level of exertion. Use this scale to make sure you are exercising at a level that is safe and effective for you. Cardiac rehabilitation and wellness facilities use numerical scales to rate your level of exertion. These scales usually rate your exertion level in a range from 6 to 20. A rating of 6 means that you are not exerting yourself at all. A rating of 20 indicates that you are working very hard. For a healthy exercise session, your exertion rate goal should be between 11 and 15.     This information is not intended to replace advice given to you by your health care provider. Make sure you discuss any questions you have with your health care provider.     Document Released: 12/23/2014 Document Revised: 01/08/2016 Document Reviewed: 12/23/2014  [employer] Interactive Patient Education ©2016 [employer]

## 2017-02-22 ENCOUNTER — TREATMENT (OUTPATIENT)
Dept: CARDIAC REHAB | Facility: HOSPITAL | Age: 42
End: 2017-02-22

## 2017-02-22 ENCOUNTER — OFFICE VISIT (OUTPATIENT)
Dept: CARDIOLOGY | Facility: CLINIC | Age: 42
End: 2017-02-22

## 2017-02-22 VITALS
OXYGEN SATURATION: 95 % | BODY MASS INDEX: 33.73 KG/M2 | SYSTOLIC BLOOD PRESSURE: 118 MMHG | WEIGHT: 183.3 LBS | DIASTOLIC BLOOD PRESSURE: 77 MMHG | HEART RATE: 91 BPM | HEIGHT: 62 IN

## 2017-02-22 VITALS — SYSTOLIC BLOOD PRESSURE: 130 MMHG | HEART RATE: 68 BPM | DIASTOLIC BLOOD PRESSURE: 84 MMHG

## 2017-02-22 DIAGNOSIS — E78.2 MIXED HYPERLIPIDEMIA: ICD-10-CM

## 2017-02-22 DIAGNOSIS — Z95.5 STENTED CORONARY ARTERY: Primary | ICD-10-CM

## 2017-02-22 DIAGNOSIS — E66.9 OBESITY (BMI 30-39.9): ICD-10-CM

## 2017-02-22 DIAGNOSIS — I25.10 CORONARY ARTERY DISEASE INVOLVING NATIVE CORONARY ARTERY OF NATIVE HEART WITHOUT ANGINA PECTORIS: Primary | ICD-10-CM

## 2017-02-22 DIAGNOSIS — Z72.0 TOBACCO ABUSE: ICD-10-CM

## 2017-02-22 PROCEDURE — 99214 OFFICE O/P EST MOD 30 MIN: CPT | Performed by: INTERNAL MEDICINE

## 2017-02-22 PROCEDURE — 93798 PHYS/QHP OP CAR RHAB W/ECG: CPT

## 2017-02-22 NOTE — PROGRESS NOTES
Subjective   Ekaterina Verdin is a 42 y.o. female.     Chief Complaint   Patient presents with   • Follow-up   • Coronary Artery Disease       History of Present Illness     Mrs. Verdin is a pleasant 42-year-old woman who presents for follow-up of known coronary artery disease. She presented in September 2016 with an acute inferior wall myocardial infarction and was taken emergently to the cardiac catheterization laboratory and underwent drug-eluting stent implantation at that time.  She did have an echocardiogram performed during her hospitalization which was normal.  She presented approximately one month ago with continued depression and anxiety.  She also noticed a significant weight gain and had not been exercising.  Rather than proceed with noninvasive testing I referred her to cardiac rehabilitation and in addition to this she has gone to counseling.  She has also anticipated in speaking engagements to discuss her clinical course with others.  All of these things have led to her feeling much better.  She currently denies any angina or anginal-like symptoms.  She has been exercising routinely in cardiac rehabilitation without difficulty.  She denies any heart failure symptoms.  She denies any palpitations, near syncope or syncopal symptoms.  Of note, she did undergo an echocardiogram which demonstrated preserved LV function without any wall motion abnormalities whatsoever.  She has been compliant on medical therapy.     The following portions of the patient's history were reviewed and updated as appropriate: allergies, current medications, past family history, past medical history, past social history, past surgical history and problem list.    Review of Systems   Constitutional: Positive for activity change. Negative for appetite change and fatigue.   HENT: Negative for congestion and tinnitus.    Eyes: Positive for visual disturbance.   Respiratory: Negative for cough, chest tightness and shortness of breath.     Cardiovascular: Negative for chest pain, palpitations and leg swelling.   Gastrointestinal: Positive for nausea. Negative for blood in stool and vomiting.   Endocrine: Negative for cold intolerance, heat intolerance and polyuria.   Genitourinary: Negative for dysuria.   Musculoskeletal: Negative for myalgias and neck pain.   Skin: Negative for rash.   Neurological: Negative for dizziness, syncope, weakness and light-headedness.   Hematological: Does not bruise/bleed easily.   Psychiatric/Behavioral: Positive for sleep disturbance. Negative for confusion.       Objective   Physical Exam   Constitutional: She is oriented to person, place, and time. She appears well-developed and well-nourished. No distress.   HENT:   Head: Normocephalic and atraumatic.   Nose: Nose normal.   Mouth/Throat: Oropharynx is clear and moist.   Eyes: Conjunctivae and EOM are normal. Right eye exhibits no discharge. Left eye exhibits no discharge. No scleral icterus.   Neck: Normal range of motion. No hepatojugular reflux and no JVD present. Carotid bruit is not present. No tracheal deviation present.   Cardiovascular: Normal rate, regular rhythm, S1 normal, S2 normal and intact distal pulses.  PMI is not displaced.  Exam reveals no gallop, no S3, no S4 and no friction rub.    No murmur heard.  Pulmonary/Chest: Effort normal and breath sounds normal. No accessory muscle usage. No respiratory distress. She has no wheezes. She has no rales. She exhibits no tenderness.   Abdominal: Soft. Bowel sounds are normal. She exhibits no distension. There is no tenderness.   Musculoskeletal: Normal range of motion. She exhibits no edema or tenderness.       Vascular Status -  Her exam exhibits no right foot edema. Her exam exhibits no left foot edema.  Neurological: She is alert and oriented to person, place, and time. No cranial nerve deficit. Coordination normal.   Skin: Skin is warm and dry. She is not diaphoretic.   Nursing note and vitals  reviewed.      Procedures    Assessment/Plan     Coronary Artery Disease. I suspect that overall the patient's coronary artery disease is stable.  Specifically, with treatment of her depression and anxiety her symptoms have abated.  Additionally, she is exercising on a routine basis without any symptoms whatsoever.  For now I will continue current medical therapy as is.     Dyspnea In regard to her complaint of dyspnea, I suspect that this was largely due to obesity and anxiety as well as deconditioning.  Her symptoms have improved and I've encouraged her to remain active and to continue to work on weight loss.      Hyperlipidemia In regard to their history of hyperlipidemia, she is tolerating statin therapy without difficulty. I will obtain a follow-up lipid panel.      Tobacco.  In regard to their history of tobacco consumption, I have congratulated her on discontinuing tobacco products. I've asked her to remain tobacco free.      Obesity. I spent a long time discussing the patient's weight and relevance to their cardiac risk profile. I encouraged them to work on weight loss through aerobic exercise and diet. I have discussed diet options to include weight watchers and the Mediterranean diet etc. I encouraged them to achieve a BMI of less than 25.     In short, it is been a pleasure to participate in Mrs. Verdin's care, I look forward to seeing her again in 6 months.

## 2017-02-22 NOTE — PROGRESS NOTES
Pt attended phase II visit.  Tolerated exercise well, NAD noted, no c/o's voiced, skin w/p/d, resp nl and even, denies chest discomfort,see [facility] documentation for exercise data.  Dr. Aponte physician immediately available.  V/S WIDL

## 2017-02-24 ENCOUNTER — APPOINTMENT (OUTPATIENT)
Dept: CARDIAC REHAB | Facility: HOSPITAL | Age: 42
End: 2017-02-24

## 2017-03-01 ENCOUNTER — APPOINTMENT (OUTPATIENT)
Dept: CARDIAC REHAB | Facility: HOSPITAL | Age: 42
End: 2017-03-01

## 2017-03-02 ENCOUNTER — OFFICE VISIT (OUTPATIENT)
Dept: PSYCHIATRY | Facility: CLINIC | Age: 42
End: 2017-03-02

## 2017-03-02 DIAGNOSIS — F41.0 PANIC DISORDER WITHOUT AGORAPHOBIA: Primary | ICD-10-CM

## 2017-03-02 DIAGNOSIS — F06.31 MOOD DISORDER WITH DEPRESSIVE FEATURES DUE TO MEDICAL CONDITION: ICD-10-CM

## 2017-03-02 PROCEDURE — 90837 PSYTX W PT 60 MINUTES: CPT | Performed by: SOCIAL WORKER

## 2017-03-02 NOTE — PROGRESS NOTES
Ekaterina Verdin : 1975    MULTIDISCIPLINARY PROGRESS NOTE    Date of Service: 3/02/2017  Time In: 1:00 PM              Time Out: 2:00 PM     DATA: Patient met 1:1 with Sreedhar Darnell LCSW, LCADC for scheduled individual outpatient psychotherapy session at [facility] for follow-up.  Patient reports she continues to live in the home with her  of many years and states she continues to spend most of her time at home when she is not attending cardiac rehabilitation.  She also reports she continues to spend as much time as possible with her grandchildren and states she plans to become more active by walking 2-3 days a week with her mother at a local walking track.  The patient reports she continues to have significant symptoms which she states occur in the evening and states she isn't sure what triggers them.  She also reports she continues to have various psychological triggers of the day she had her heart attack around her home and reports she has vivid flashbacks from time to time.    HPI: Patient reports she continues to struggle with what she describes as an adrenaline rush and reports this happens normally in the evening daily.  She reports she continues to feel on edge, increased heart rate, a sense of trembling, restlessness, a sense of tingling, and a sense of increased body temperature.  The patient reports she realizes this doesn't cause her physical harm but states it is very distressing.  She rates current symptoms of anxiety at a 5 on a scale of 1-10 with 10 being most severe but reports they reach 9/10 during their most severe.  She also reports she continues to struggle with intermittent periods of depressed mood, feeling hopeless, anhedonia, and social isolation.  She reports she can usually quickly counteract her depressive mood and rates current symptoms at a 4 on a scale of 1-10 with 10 being the most severe.  Patient also reports she continues to struggle with sleep  and states she falls asleep easily but wakes after only 2-3 hours and ends up getting up for the night.  She states this results in her feeling tired most of the time.  Patient reports she continues to adhere to medication regimen as prescribed with no side effects and continues to participate in cardiac rehabilitation as recommended.  The patient adamantly and convincingly denies suicidal ideation and vehemently denies any history of substance use.    CLNICAL MANEUVERING/INTERVENTION: Assisted patient in processing above session content; acknowledged and normalized patient’s thoughts, feelings, and concerns.  Discussed the therapist patient relationship and explain the parameters and limitations of relative confidentiality.  Also discussed the importance of regular attendance, active participation, and honesty to the treatment process.  The patient to continue to discuss/vent her feelings and fears concerning her recent cardiac event and validated her feelings.  Encouraged the patient to remind herself it is normal to feel these emotions and sense of vulnerability following such a life changing event and encouraged her to allow herself to experience her feelings and work through them.  Also challenged the patient to consciously look for activities she can engage in to keep herself busy and give her a sense of being for feel including volunteering at a local nursing home, walking with her mother 2-3 times weekly, and finding other enjoyable activities she can engage in a regular basis to feel her idle time and reduce social isolation.  Assisted the patient in developing healthy sleep hygiene including regular bedtimes, regular wake times, avoidance of excessive amounts of caffeine, avoidance of watching television prior to falling asleep, and creating a calm comfortable sleep environment.  Also advised the patient to consider trying over-the-counter melatonin as a mild sleep aid.  Also provided the patient with the  "point \"[employer]\" as she states her sae is one of her primary sources of support.  Provided unconditional positive regard in a safe, supportive environment.    Allowed patient to freely discuss issues without interruption or judgment. Provided safe, confidential environment to facilitate the development of positive therapeutic relationship and encourage open, honest communication. Assisted patient in identifying increased risk factors which would indicate the need for higher level of care including thoughts to harm self or others, self-harming behavior,  and encouraged patient to contact this office, call 911, or present to the nearest emergency room should any of these events occur. Discussed crisis intervention services and means to access.     ASSESSMENT: Patient presents for session on time, clean and casually dressed with anxious mood and congruent affect. No evidence of intoxication, withdrawal, or perceptual disturbance. Thought process is linear and logical.  Thought content normal.  Patient presents with mild psychomotor agitation as she shakes her leg periodically throughout the session.  Speech is clear and coherent. Patient is oriented to person, place, and time. Attention and concentration fair. Insight and judgment fair. Patient reports no current suicidal or homicidal ideation.  Patient appears cooperative and agreeable to treatment and appears to begin to develop rapport. No evidence of symptoms of sapna or hypomania. Patient does not appear to be malingering.  The patient appears to continue to struggle with significant symptoms of anxiety with periods of depression being secondary following her cardiac event several months ago.  Patient scored a composite score of 32 on the Kumar Depression Inventory which indicates moderate to severe depression and a composite score of 62 on the back anxiety inventory which indicates severe anxiety.  As a result, her symptoms cause significant impairment in " important areas of functioning.  The patient can be reasonably expected to continue to benefit from treatment and would likely be at increased risk for decompensation otherwise.    PLAN: Patient will continue in individual outpatient psychotherapy sessions every 2-3 weeks and pharmacotherapy as scheduled at [facility]. Patient will contact this office, call 911 or present to the nearest emergency room should suicidal ideations occur. Provide Cognitive Behavioral Therapy and Solution Focused Therapy to improve functioning, maintain stability, and avoid decompensation and the need for higher level of care.                                                       Sreedhar Darnell, LITTLE, [facility] HospitalDC

## 2017-03-03 ENCOUNTER — APPOINTMENT (OUTPATIENT)
Dept: CARDIAC REHAB | Facility: HOSPITAL | Age: 42
End: 2017-03-03

## 2017-03-08 ENCOUNTER — APPOINTMENT (OUTPATIENT)
Dept: CARDIAC REHAB | Facility: HOSPITAL | Age: 42
End: 2017-03-08

## 2017-03-10 ENCOUNTER — APPOINTMENT (OUTPATIENT)
Dept: CARDIAC REHAB | Facility: HOSPITAL | Age: 42
End: 2017-03-10

## 2017-03-15 ENCOUNTER — APPOINTMENT (OUTPATIENT)
Dept: CARDIAC REHAB | Facility: HOSPITAL | Age: 42
End: 2017-03-15

## 2017-03-15 ENCOUNTER — OFFICE VISIT (OUTPATIENT)
Dept: FAMILY MEDICINE CLINIC | Facility: CLINIC | Age: 42
End: 2017-03-15

## 2017-03-15 VITALS
HEART RATE: 62 BPM | WEIGHT: 185 LBS | BODY MASS INDEX: 34.93 KG/M2 | SYSTOLIC BLOOD PRESSURE: 120 MMHG | OXYGEN SATURATION: 97 % | HEIGHT: 61 IN | TEMPERATURE: 99 F | DIASTOLIC BLOOD PRESSURE: 79 MMHG

## 2017-03-15 DIAGNOSIS — Z13.9 SCREENING: ICD-10-CM

## 2017-03-15 DIAGNOSIS — F41.0 ANXIETY DISORDER DUE TO GENERAL MEDICAL CONDITION WITH PANIC ATTACK: Primary | ICD-10-CM

## 2017-03-15 DIAGNOSIS — A04.8 HELICOBACTER PYLORI (H. PYLORI) INFECTION: ICD-10-CM

## 2017-03-15 DIAGNOSIS — H61.23 EXCESSIVE CERUMEN IN EAR CANAL, BILATERAL: ICD-10-CM

## 2017-03-15 DIAGNOSIS — F06.4 ANXIETY DISORDER DUE TO GENERAL MEDICAL CONDITION WITH PANIC ATTACK: Primary | ICD-10-CM

## 2017-03-15 DIAGNOSIS — I25.10 CORONARY ARTERY DISEASE INVOLVING NATIVE CORONARY ARTERY OF NATIVE HEART WITHOUT ANGINA PECTORIS: ICD-10-CM

## 2017-03-15 PROCEDURE — 99214 OFFICE O/P EST MOD 30 MIN: CPT | Performed by: NURSE PRACTITIONER

## 2017-03-15 PROCEDURE — 69210 REMOVE IMPACTED EAR WAX UNI: CPT | Performed by: NURSE PRACTITIONER

## 2017-03-15 RX ORDER — LORAZEPAM 1 MG/1
1 TABLET ORAL EVERY 8 HOURS PRN
Qty: 60 TABLET | Refills: 0 | Status: SHIPPED | OUTPATIENT
Start: 2017-03-15 | End: 2017-06-15

## 2017-03-15 RX ORDER — HYDROXYZINE PAMOATE 25 MG/1
25 CAPSULE ORAL 3 TIMES DAILY PRN
Qty: 90 CAPSULE | Refills: 5 | Status: SHIPPED | OUTPATIENT
Start: 2017-03-15 | End: 2017-06-15

## 2017-03-15 NOTE — PROGRESS NOTES
Subjective   Ekaterina Verdin is a 42 y.o. female.     History of Present Illness       Personal history of MI  - Cardiac Rehab has helped with her strength.     Anxiety  - no improvement. She feels as if she may have a panic attack at any time. Not sleeping well at night. No history of substance abuse or addiction. She is trying to stay busy with     GERD  Having some symptoms like she had with her last H. Pylori infection. Stomach eases with a few bites of stomach, not really pain, just nagging feeling. Not worse when she lays down.    Boils  Has frequent boils in groin area when she shaver her bikini line.       The following portions of the patient's history were reviewed and updated as appropriate: allergies, current medications, past family history, past medical history, past social history, past surgical history and problem list.    Review of Systems   Constitutional: Negative for activity change, appetite change, chills, fatigue, fever and unexpected weight change.   HENT: Negative.    Eyes: Negative.    Respiratory: Negative for cough, choking, chest tightness, shortness of breath and wheezing.    Cardiovascular: Negative for chest pain, palpitations and leg swelling.   Gastrointestinal: Negative for blood in stool, constipation, diarrhea, nausea and vomiting.   Endocrine: Negative.    Genitourinary: Negative for dyspareunia and dysuria.   Musculoskeletal: Positive for arthralgias.   Allergic/Immunologic: Positive for environmental allergies.   Neurological: Negative.    Hematological: Negative.    Psychiatric/Behavioral: Negative for self-injury and suicidal ideas. The patient is nervous/anxious.    All other systems reviewed and are negative.      Objective   Physical Exam   Constitutional: She is oriented to person, place, and time. She appears well-developed and well-nourished. No distress.   HENT:   Head: Normocephalic.   Right Ear: Tympanic membrane and external ear normal.   Left Ear: Tympanic membrane and  external ear normal.   Nose: Nose normal.   Mouth/Throat: Oropharynx is clear and moist. No oropharyngeal exudate.   Bilateral ear canals impacted with cerumen, removed via provider with flexi-loop. Honey colored cerumen removed, tolerated well.      Eyes: Pupils are equal, round, and reactive to light.   Neck: Normal range of motion. Neck supple. No tracheal deviation present. No thyromegaly present.   Cardiovascular: Normal rate, regular rhythm and normal heart sounds.  Exam reveals no gallop and no friction rub.    No murmur heard.  Pulmonary/Chest: Effort normal and breath sounds normal. No respiratory distress. She has no wheezes. She has no rales. She exhibits no tenderness.   Abdominal: Soft. Bowel sounds are normal. She exhibits no distension and no mass. There is no tenderness. There is no rebound and no guarding.   Musculoskeletal: Normal range of motion.   Lymphadenopathy:     She has no cervical adenopathy.   Neurological: She is alert and oriented to person, place, and time. She has normal reflexes.   CN 2-12 grossly intact    Skin: Skin is warm and dry. No rash noted. She is not diaphoretic. No erythema.   Psychiatric: She has a normal mood and affect. Her speech is normal and behavior is normal. Judgment and thought content normal. Cognition and memory are normal.   Vitals reviewed.      Assessment/Plan       Diagnoses and all orders for this visit:    Anxiety disorder due to general medical condition with panic attack  -     hydrOXYzine (VISTARIL) 25 MG capsule; Take 1 capsule by mouth 3 (Three) Times a Day As Needed for Itching or Anxiety. Advised pt to take as first line for anxiety as needed.   -     LORazepam (ATIVAN) 1 MG tablet; Take 1 tablet by mouth Every 8 (Eight) Hours As Needed for Anxiety or sleep. Pt advised to use this only is vistaril is not helpful.   -     Urine Drug Screen  .Goal: Improved quality of life and anxiety control.   Low risk for substance abuse.    Coronary artery  disease involving native coronary artery of native heart without angina pectoris  - most recent labs reviewed. Good reduction in cholesterol. Pt is making necessary life style changes.   Remain under care of cardiology.     Helicobacter pylori (H. pylori) infection  - start prev-pack. Report failure to improve over next week.    Excessive cerumen in ear canal, bilateral  Bilateral ear canals impacted with cerumen, removed via provider with flexi-loop. Honey colored cerumen removed, tolerated well.     Other orders/Boils  -     mupirocin (BACTROBAN) 2 % ointment; Apply  topically 3 (Three) Times a Day.   - avoid use of used razors, wash area with antibacterial soaps after shaving and apply Bactroban immediately after shaving. Report any new boils.     I have discussed diagnosis in detail today allowing time for questions and answers. Pt is aware of reasons to seek urgent or emergent medical care as well as reasons to return to the clinic for evaluation. Possible side effects, interactions and progression of symptoms discussed as well. Pt / family states understanding.   Emotional support and active listening provided.   Pt is seeing LCSW at this time and feel that this therapy is very helpful.  Follow up 1-2 months, sooner if needed. Fasting labs in 2-3 months.

## 2017-03-17 ENCOUNTER — APPOINTMENT (OUTPATIENT)
Dept: CARDIAC REHAB | Facility: HOSPITAL | Age: 42
End: 2017-03-17

## 2017-03-22 ENCOUNTER — APPOINTMENT (OUTPATIENT)
Dept: CARDIAC REHAB | Facility: HOSPITAL | Age: 42
End: 2017-03-22

## 2017-03-24 ENCOUNTER — APPOINTMENT (OUTPATIENT)
Dept: CARDIAC REHAB | Facility: HOSPITAL | Age: 42
End: 2017-03-24

## 2017-03-29 ENCOUNTER — APPOINTMENT (OUTPATIENT)
Dept: CARDIAC REHAB | Facility: HOSPITAL | Age: 42
End: 2017-03-29

## 2017-03-30 ENCOUNTER — OFFICE VISIT (OUTPATIENT)
Dept: PSYCHIATRY | Facility: CLINIC | Age: 42
End: 2017-03-30

## 2017-03-30 DIAGNOSIS — F41.0 PANIC DISORDER WITHOUT AGORAPHOBIA: Primary | ICD-10-CM

## 2017-03-30 DIAGNOSIS — F06.31 MOOD DISORDER WITH DEPRESSIVE FEATURES DUE TO MEDICAL CONDITION: ICD-10-CM

## 2017-03-30 PROCEDURE — 90837 PSYTX W PT 60 MINUTES: CPT | Performed by: SOCIAL WORKER

## 2017-03-30 NOTE — PROGRESS NOTES
Ekaterina Verdin : 1975     MULTIDISCIPLINARY PROGRESS NOTE     Date of Service: 3/30/2017  Time In: 2:35 PM  Time Out: 3:35 PM     DATA: Patient met 1:1 with Sreedhar Darnell LCSW, LCADC for scheduled individual outpatient psychotherapy session at [facility] for follow-up.  Patient reports she continues living home with her  of 24 years and states he continues to be very rigid and secluded which makes it virtually impossible for him to leave the property.  In fact, she states he has been promising her to take her out to a movie and then her for their entire marriage yet it hasn't happened.  The patient does report she is spending more time with her son and states he comes and picks her up and takes her out on a regular basis and even comes and goes on walks with her.  The patient also reports she will be keeping her 7-month-old granddaughter overnight for the first time on this date and states she is somewhat apprehensive due to the fact she was born premature and continues to have some issues.  The patient states she is terrified her grandchild will stop breathing during the night.     HPI: Patient reports her panic attacks in the evening have decreased significantly and states they went from daily to 2 incidents in the past week.. She reports she continues to feel on edge, increased heart rate, a sense of trembling, restlessness, a sense of tingling, and a sense of increased body temperature.  She rates current symptoms of anxiety at a 5 on a scale of 1-10 with 10 being most severe but reports they reach 9/10 during their most severe.  However she reports they are not lasting as long and are not as frequent.  She attributes her improvement to the fact she is challenging her anxiety provoking thoughts, keeping herself busy and states she feels the recent medication adjustment with the addition of Ativan and Vistaril has been effective.  She also reports she continues to struggle  with intermittent periods of depressed mood, feeling hopeless, anhedonia, and social isolation.  She reports she feels this is exacerbated by the fact she shoulders the entire responsibility for everything in the home as her  refuses to interact in social situations.  She also reports she believes this causes her some depression as she feels she cannot count on her  as his fears and obsessions are more important than anything else.  The patient rates current symptoms at a 4 on a scale of 1-10 with 10 being the most severe.  Patient reports her sleep quantity and quality has improved and states she is sleeping 6/7 hours nightly. Patient reports she continues to adhere to medication regimen as prescribed with no side effects and continues to participate in cardiac rehabilitation as recommended. The patient adamantly and convincingly denies suicidal ideation and vehemently denies any history of substance use.     CLNICAL MANEUVERING/INTERVENTION: Assisted patient in processing above session content; acknowledged and normalized patient’s thoughts, feelings, and concerns.  Acknowledged and praised the patient's progress and encouraged her to take a careful look at steps she has taken to make positive change including being more active, avoiding social isolation, and challenging her automatic negative thoughts.  Assisted the patient in identifying the change in her underlying metacognition's following her heart attack and encouraged her to challenge her belief there is something horrible looming on the horizon.  Also provided the patient with a worksheet on countering anxiety and the work she encounter in negative thoughts and encouraged her to complete and return in next session for further discussion.  Also assisted the patient in identifying counter evidence for her thought that something will likely happen to her granddaughter while at her house overnight including the fact she is 7 months old and is  doing much better and her parents sleep through the night with no incident.  Also reminded the patient she raised 2 children and has the knowledge and skills to take care of her grandchild.  Provided unconditional positive regard in a safe, supportive environment.     Allowed patient to freely discuss issues without interruption or judgment. Provided safe, confidential environment to facilitate the development of positive therapeutic relationship and encourage open, honest communication. Assisted patient in identifying increased risk factors which would indicate the need for higher level of care including thoughts to harm self or others, self-harming behavior, and encouraged patient to contact this office, call 911, or present to the nearest emergency room should any of these events occur. Discussed crisis intervention services and means to access.      ASSESSMENT: Patient presents for session on time, clean and casually dressed with stable mood and congruent affect. No evidence of intoxication, withdrawal, or perceptual disturbance. Thought process is linear and logical.  Thought content normal. Patient presents with markedly decreased psychomotor agitation which was previously manifested by constantly shaking her legs throughout the session.  Speech is clear and coherent. Patient is oriented to person, place, and time. Attention and concentration fair. Insight and judgment fair. Patient reports no current suicidal or homicidal ideation. Patient appears cooperative and agreeable to treatment and appears to begin to develop rapport. No evidence of symptoms of sapna or hypomania. Patient does not appear to be malingering.  The patient appears to continue to struggle with significant symptoms of anxiety with periods of depression being secondary following her cardiac event several months ago.  However, she appears to have made moderate progress as her panic attacks have decreased from daily to twice in the past week.   The patient's symptoms do seem to continue to be exacerbated by the dynamics of her marriage which are negatively affected by her 's inability to leave the home.  As a result, her symptoms cause significant impairment in important areas of functioning. The patient can be reasonably expected to continue to benefit from treatment and would likely be at increased risk for decompensation otherwise.     PLAN: Patient will continue in individual outpatient psychotherapy sessions every 2-3 weeks and pharmacotherapy as scheduled at [facility]. Patient will contact this office, call 911 or present to the nearest emergency room should suicidal ideations occur. Provide Cognitive Behavioral Therapy and Solution Focused Therapy to improve functioning, maintain stability, and avoid decompensation and the need for higher level of care.          Sreedhar Darnell LCSW, NICHELLEDC

## 2017-03-30 NOTE — TREATMENT PLAN
Multi-Disciplinary Problems (from Behavioral Health Treatment Plan)    Active Problems     Problem: Anxiety (Priority: --)  (Start Date: 03/30/17) (Resolve Date: --)    Problem Details:  The patient self-scales this problem as a 5 with 10 being the worst.         Goal Start Date End Date    Patient will develop and implement behavioral and cognitive strategies to reduce anxiety and irrational fears. 03/30/17 --    Goal Details:  Progress toward goal:  Not appropriate to rate progress toward goal since this is the initial treatment plan.         Goal Intervention Frequency Start Date End Date    Help patient explore past emotional issues in relation to present anxiety. Q2 Weeks 03/30/17 03/03/18    Intervention Details:  Duration of treatment until remission of symptoms.         Goal Intervention Frequency Start Date End Date    Help patient develop an awareness of their cognitive and physical responses to anxiety. Q2 Weeks 03/30/17 03/03/18    Intervention Details:  Duration of treatment until remission of symptoms.               Problem: Depression (Priority: --)  (Start Date: 03/30/17) (Resolve Date: --)    Problem Details:  The patient self-scales this problem as a 4 with 10 being the worst.         Goal Start Date End Date    Patient will demonstrate the ability to initiate new constructive life skills outside of sessions on a consistent basis. 03/30/17 --    Goal Details:  Progress toward goal:  Not appropriate to rate progress toward goal since this is the initial treatment plan.         Goal Intervention Frequency Start Date End Date    Assist patient in setting attainable activities of daily living goals. Q2 Weeks 03/30/17 03/03/18    Intervention Details:  Patient will keep all scheduled appointments and follow recommendations.  Patient will find activities she can engage in a regular basis to avoid social isolation and increase her ability to deal with social situations.  Patient will also focus on healthy  skills of daily living including eating a healthy diet, getting as much physical activity as possible, and getting adequate sleep.       Goal Intervention Frequency Start Date End Date    Provide education about depression Q2 Weeks 03/30/17 03/03/18    Intervention Details:  Duration of treatment until remission of symptoms.         Goal Intervention Frequency Start Date End Date    Assist patient in developing healthy coping strategies. Q2 Weeks 03/30/17 03/03/18    Intervention Details:  Duration of treatment until remission of symptoms.                            I have discussed and reviewed this treatment plan with the patient.  It has been printed for signatures.

## 2017-03-31 ENCOUNTER — APPOINTMENT (OUTPATIENT)
Dept: CARDIAC REHAB | Facility: HOSPITAL | Age: 42
End: 2017-03-31

## 2017-04-05 ENCOUNTER — APPOINTMENT (OUTPATIENT)
Dept: CARDIAC REHAB | Facility: HOSPITAL | Age: 42
End: 2017-04-05

## 2017-04-07 ENCOUNTER — APPOINTMENT (OUTPATIENT)
Dept: CARDIAC REHAB | Facility: HOSPITAL | Age: 42
End: 2017-04-07

## 2017-04-12 ENCOUNTER — APPOINTMENT (OUTPATIENT)
Dept: CARDIAC REHAB | Facility: HOSPITAL | Age: 42
End: 2017-04-12

## 2017-04-14 ENCOUNTER — APPOINTMENT (OUTPATIENT)
Dept: CARDIAC REHAB | Facility: HOSPITAL | Age: 42
End: 2017-04-14

## 2017-04-17 DIAGNOSIS — F06.4 ANXIETY DISORDER DUE TO GENERAL MEDICAL CONDITION WITH PANIC ATTACK: ICD-10-CM

## 2017-04-17 DIAGNOSIS — F41.0 ANXIETY DISORDER DUE TO GENERAL MEDICAL CONDITION WITH PANIC ATTACK: ICD-10-CM

## 2017-04-18 RX ORDER — LORAZEPAM 1 MG/1
TABLET ORAL
Qty: 60 TABLET | Refills: 0 | OUTPATIENT
Start: 2017-04-18

## 2017-04-19 ENCOUNTER — APPOINTMENT (OUTPATIENT)
Dept: CARDIAC REHAB | Facility: HOSPITAL | Age: 42
End: 2017-04-19

## 2017-04-21 ENCOUNTER — APPOINTMENT (OUTPATIENT)
Dept: CARDIAC REHAB | Facility: HOSPITAL | Age: 42
End: 2017-04-21

## 2017-04-26 ENCOUNTER — APPOINTMENT (OUTPATIENT)
Dept: CARDIAC REHAB | Facility: HOSPITAL | Age: 42
End: 2017-04-26

## 2017-04-26 RX ORDER — ATORVASTATIN CALCIUM 80 MG/1
TABLET, FILM COATED ORAL
Qty: 30 TABLET | Refills: 0 | Status: SHIPPED | OUTPATIENT
Start: 2017-04-26 | End: 2017-05-29 | Stop reason: SDUPTHER

## 2017-04-26 RX ORDER — ASPIRIN 325 MG
TABLET, DELAYED RELEASE (ENTERIC COATED) ORAL
Qty: 30 TABLET | Refills: 0 | Status: SHIPPED | OUTPATIENT
Start: 2017-04-26 | End: 2017-05-29 | Stop reason: SDUPTHER

## 2017-04-26 RX ORDER — CLOPIDOGREL BISULFATE 75 MG/1
TABLET ORAL
Qty: 30 TABLET | Refills: 0 | Status: SHIPPED | OUTPATIENT
Start: 2017-04-26 | End: 2017-05-29 | Stop reason: SDUPTHER

## 2017-04-26 RX ORDER — LISINOPRIL 2.5 MG/1
TABLET ORAL
Qty: 30 TABLET | Refills: 0 | Status: SHIPPED | OUTPATIENT
Start: 2017-04-26 | End: 2017-05-29 | Stop reason: SDUPTHER

## 2017-04-26 RX ORDER — OMEPRAZOLE 40 MG/1
CAPSULE, DELAYED RELEASE ORAL
Qty: 30 CAPSULE | Refills: 0 | Status: SHIPPED | OUTPATIENT
Start: 2017-04-26 | End: 2017-05-29 | Stop reason: SDUPTHER

## 2017-04-28 ENCOUNTER — APPOINTMENT (OUTPATIENT)
Dept: CARDIAC REHAB | Facility: HOSPITAL | Age: 42
End: 2017-04-28

## 2017-05-03 ENCOUNTER — APPOINTMENT (OUTPATIENT)
Dept: CARDIAC REHAB | Facility: HOSPITAL | Age: 42
End: 2017-05-03

## 2017-05-05 ENCOUNTER — APPOINTMENT (OUTPATIENT)
Dept: CARDIAC REHAB | Facility: HOSPITAL | Age: 42
End: 2017-05-05

## 2017-05-10 ENCOUNTER — APPOINTMENT (OUTPATIENT)
Dept: CARDIAC REHAB | Facility: HOSPITAL | Age: 42
End: 2017-05-10

## 2017-05-12 ENCOUNTER — APPOINTMENT (OUTPATIENT)
Dept: CARDIAC REHAB | Facility: HOSPITAL | Age: 42
End: 2017-05-12

## 2017-05-17 ENCOUNTER — APPOINTMENT (OUTPATIENT)
Dept: CARDIAC REHAB | Facility: HOSPITAL | Age: 42
End: 2017-05-17

## 2017-05-19 ENCOUNTER — APPOINTMENT (OUTPATIENT)
Dept: CARDIAC REHAB | Facility: HOSPITAL | Age: 42
End: 2017-05-19

## 2017-05-24 ENCOUNTER — APPOINTMENT (OUTPATIENT)
Dept: CARDIAC REHAB | Facility: HOSPITAL | Age: 42
End: 2017-05-24

## 2017-05-26 ENCOUNTER — APPOINTMENT (OUTPATIENT)
Dept: CARDIAC REHAB | Facility: HOSPITAL | Age: 42
End: 2017-05-26

## 2017-05-29 RX ORDER — BUPROPION HYDROCHLORIDE 150 MG/1
TABLET ORAL
Qty: 30 TABLET | Refills: 0 | Status: CANCELLED | OUTPATIENT
Start: 2017-05-29

## 2017-05-30 RX ORDER — LISINOPRIL 2.5 MG/1
TABLET ORAL
Qty: 30 TABLET | Refills: 0 | Status: SHIPPED | OUTPATIENT
Start: 2017-05-30 | End: 2017-06-15 | Stop reason: SDUPTHER

## 2017-05-30 RX ORDER — ATORVASTATIN CALCIUM 80 MG/1
TABLET, FILM COATED ORAL
Qty: 30 TABLET | Refills: 0 | Status: SHIPPED | OUTPATIENT
Start: 2017-05-30 | End: 2017-06-15

## 2017-05-30 RX ORDER — ASPIRIN 325 MG
TABLET, DELAYED RELEASE (ENTERIC COATED) ORAL
Qty: 30 TABLET | Refills: 0 | Status: SHIPPED | OUTPATIENT
Start: 2017-05-30 | End: 2017-06-15 | Stop reason: SDUPTHER

## 2017-05-30 RX ORDER — OMEPRAZOLE 40 MG/1
CAPSULE, DELAYED RELEASE ORAL
Qty: 30 CAPSULE | Refills: 0 | Status: SHIPPED | OUTPATIENT
Start: 2017-05-30 | End: 2017-06-15 | Stop reason: SDUPTHER

## 2017-05-30 RX ORDER — CLOPIDOGREL BISULFATE 75 MG/1
TABLET ORAL
Qty: 30 TABLET | Refills: 0 | Status: SHIPPED | OUTPATIENT
Start: 2017-05-30 | End: 2017-06-15 | Stop reason: SDUPTHER

## 2017-05-31 ENCOUNTER — APPOINTMENT (OUTPATIENT)
Dept: CARDIAC REHAB | Facility: HOSPITAL | Age: 42
End: 2017-05-31

## 2017-06-02 ENCOUNTER — APPOINTMENT (OUTPATIENT)
Dept: CARDIAC REHAB | Facility: HOSPITAL | Age: 42
End: 2017-06-02

## 2017-06-05 RX ORDER — BUPROPION HYDROCHLORIDE 150 MG/1
150 TABLET ORAL DAILY
Qty: 30 TABLET | Refills: 3
Start: 2017-06-05 | End: 2017-06-15 | Stop reason: SDUPTHER

## 2017-06-06 ENCOUNTER — LAB (OUTPATIENT)
Dept: FAMILY MEDICINE CLINIC | Facility: CLINIC | Age: 42
End: 2017-06-06

## 2017-06-06 DIAGNOSIS — I25.10 CORONARY ARTERY DISEASE INVOLVING NATIVE CORONARY ARTERY OF NATIVE HEART WITHOUT ANGINA PECTORIS: ICD-10-CM

## 2017-06-06 DIAGNOSIS — Z13.9 SCREENING: ICD-10-CM

## 2017-06-06 LAB
ALBUMIN SERPL-MCNC: 3.9 G/DL (ref 3.5–5)
ALBUMIN/GLOB SERPL: 1.3 G/DL (ref 1.5–2.5)
ALP SERPL-CCNC: 67 U/L (ref 35–104)
ALT SERPL W P-5'-P-CCNC: 26 U/L (ref 10–36)
ANION GAP SERPL CALCULATED.3IONS-SCNC: 4.2 MMOL/L (ref 3.6–11.2)
AST SERPL-CCNC: 21 U/L (ref 10–30)
BASOPHILS # BLD AUTO: 0.02 10*3/MM3 (ref 0–0.3)
BASOPHILS NFR BLD AUTO: 0.3 % (ref 0–2)
BILIRUB SERPL-MCNC: 0.3 MG/DL (ref 0.2–1.8)
BUN BLD-MCNC: 9 MG/DL (ref 7–21)
BUN/CREAT SERPL: 9.3 (ref 7–25)
CALCIUM SPEC-SCNC: 9 MG/DL (ref 7.7–10)
CHLORIDE SERPL-SCNC: 105 MMOL/L (ref 99–112)
CHOLEST SERPL-MCNC: 150 MG/DL (ref 0–200)
CO2 SERPL-SCNC: 28.8 MMOL/L (ref 24.3–31.9)
CREAT BLD-MCNC: 0.97 MG/DL (ref 0.43–1.29)
DEPRECATED RDW RBC AUTO: 46.1 FL (ref 37–54)
EOSINOPHIL # BLD AUTO: 0.07 10*3/MM3 (ref 0–0.7)
EOSINOPHIL NFR BLD AUTO: 1 % (ref 0–5)
ERYTHROCYTE [DISTWIDTH] IN BLOOD BY AUTOMATED COUNT: 13.5 % (ref 11.5–14.5)
GFR SERPL CREATININE-BSD FRML MDRD: 62 ML/MIN/1.73
GLOBULIN UR ELPH-MCNC: 3.1 GM/DL
GLUCOSE BLD-MCNC: 127 MG/DL (ref 70–110)
HBA1C MFR BLD: 5.9 % (ref 4.5–5.7)
HCT VFR BLD AUTO: 39.1 % (ref 37–47)
HDLC SERPL-MCNC: 35 MG/DL (ref 60–100)
HGB BLD-MCNC: 12.8 G/DL (ref 12–16)
IMM GRANULOCYTES # BLD: 0.02 10*3/MM3 (ref 0–0.03)
IMM GRANULOCYTES NFR BLD: 0.3 % (ref 0–0.5)
LDLC SERPL CALC-MCNC: 70 MG/DL (ref 0–100)
LDLC/HDLC SERPL: 1.99 {RATIO}
LYMPHOCYTES # BLD AUTO: 2.27 10*3/MM3 (ref 1–3)
LYMPHOCYTES NFR BLD AUTO: 32.4 % (ref 21–51)
MCH RBC QN AUTO: 30.8 PG (ref 27–33)
MCHC RBC AUTO-ENTMCNC: 32.7 G/DL (ref 33–37)
MCV RBC AUTO: 94 FL (ref 80–94)
MONOCYTES # BLD AUTO: 0.41 10*3/MM3 (ref 0.1–0.9)
MONOCYTES NFR BLD AUTO: 5.9 % (ref 0–10)
NEUTROPHILS # BLD AUTO: 4.21 10*3/MM3 (ref 1.4–6.5)
NEUTROPHILS NFR BLD AUTO: 60.1 % (ref 30–70)
OSMOLALITY SERPL CALC.SUM OF ELEC: 275.9 MOSM/KG (ref 273–305)
PLATELET # BLD AUTO: 273 10*3/MM3 (ref 130–400)
PMV BLD AUTO: 11.6 FL (ref 6–10)
POTASSIUM BLD-SCNC: 3.8 MMOL/L (ref 3.5–5.3)
PROT SERPL-MCNC: 7 G/DL (ref 6–8)
RBC # BLD AUTO: 4.16 10*6/MM3 (ref 4.2–5.4)
SODIUM BLD-SCNC: 138 MMOL/L (ref 135–153)
TRIGL SERPL-MCNC: 227 MG/DL (ref 0–150)
TSH SERPL DL<=0.05 MIU/L-ACNC: 1.15 MIU/ML (ref 0.55–4.78)
VIT B12 BLD-MCNC: 204 PG/ML (ref 211–911)
VLDLC SERPL-MCNC: 45.4 MG/DL
WBC NRBC COR # BLD: 7 10*3/MM3 (ref 4.5–12.5)

## 2017-06-06 PROCEDURE — 82607 VITAMIN B-12: CPT | Performed by: NURSE PRACTITIONER

## 2017-06-06 PROCEDURE — 84443 ASSAY THYROID STIM HORMONE: CPT | Performed by: NURSE PRACTITIONER

## 2017-06-06 PROCEDURE — 80061 LIPID PANEL: CPT | Performed by: NURSE PRACTITIONER

## 2017-06-06 PROCEDURE — 83036 HEMOGLOBIN GLYCOSYLATED A1C: CPT | Performed by: NURSE PRACTITIONER

## 2017-06-06 PROCEDURE — 80053 COMPREHEN METABOLIC PANEL: CPT | Performed by: NURSE PRACTITIONER

## 2017-06-06 PROCEDURE — 36415 COLL VENOUS BLD VENIPUNCTURE: CPT

## 2017-06-06 PROCEDURE — 85025 COMPLETE CBC W/AUTO DIFF WBC: CPT | Performed by: NURSE PRACTITIONER

## 2017-06-07 ENCOUNTER — APPOINTMENT (OUTPATIENT)
Dept: CARDIAC REHAB | Facility: HOSPITAL | Age: 42
End: 2017-06-07

## 2017-06-09 ENCOUNTER — APPOINTMENT (OUTPATIENT)
Dept: CARDIAC REHAB | Facility: HOSPITAL | Age: 42
End: 2017-06-09

## 2017-06-14 ENCOUNTER — APPOINTMENT (OUTPATIENT)
Dept: CARDIAC REHAB | Facility: HOSPITAL | Age: 42
End: 2017-06-14

## 2017-06-15 ENCOUNTER — OFFICE VISIT (OUTPATIENT)
Dept: FAMILY MEDICINE CLINIC | Facility: CLINIC | Age: 42
End: 2017-06-15

## 2017-06-15 VITALS
OXYGEN SATURATION: 97 % | HEIGHT: 61 IN | HEART RATE: 72 BPM | SYSTOLIC BLOOD PRESSURE: 140 MMHG | DIASTOLIC BLOOD PRESSURE: 80 MMHG | BODY MASS INDEX: 34.55 KG/M2 | WEIGHT: 183 LBS | TEMPERATURE: 98.7 F

## 2017-06-15 DIAGNOSIS — E78.2 MIXED HYPERLIPIDEMIA: ICD-10-CM

## 2017-06-15 DIAGNOSIS — F06.4 ANXIETY DISORDER DUE TO GENERAL MEDICAL CONDITION WITH PANIC ATTACK: ICD-10-CM

## 2017-06-15 DIAGNOSIS — E53.8 B12 DEFICIENCY: Primary | ICD-10-CM

## 2017-06-15 DIAGNOSIS — F41.0 ANXIETY DISORDER DUE TO GENERAL MEDICAL CONDITION WITH PANIC ATTACK: ICD-10-CM

## 2017-06-15 DIAGNOSIS — I25.10 CORONARY ARTERY DISEASE INVOLVING NATIVE CORONARY ARTERY OF NATIVE HEART WITHOUT ANGINA PECTORIS: ICD-10-CM

## 2017-06-15 PROCEDURE — 96372 THER/PROPH/DIAG INJ SC/IM: CPT | Performed by: NURSE PRACTITIONER

## 2017-06-15 PROCEDURE — 99214 OFFICE O/P EST MOD 30 MIN: CPT | Performed by: NURSE PRACTITIONER

## 2017-06-15 RX ORDER — BUPROPION HYDROCHLORIDE 150 MG/1
150 TABLET ORAL DAILY
Qty: 30 TABLET | Refills: 3
Start: 2017-06-15 | End: 2017-09-26 | Stop reason: SDUPTHER

## 2017-06-15 RX ORDER — POTASSIUM CHLORIDE 750 MG/1
10 TABLET, EXTENDED RELEASE ORAL DAILY
Qty: 30 TABLET | Refills: 5 | Status: SHIPPED | OUTPATIENT
Start: 2017-06-15 | End: 2017-10-02 | Stop reason: SDUPTHER

## 2017-06-15 RX ORDER — OMEPRAZOLE 40 MG/1
40 CAPSULE, DELAYED RELEASE ORAL DAILY
Qty: 30 CAPSULE | Refills: 5 | Status: SHIPPED | OUTPATIENT
Start: 2017-06-15 | End: 2017-10-02 | Stop reason: SDUPTHER

## 2017-06-15 RX ORDER — FUROSEMIDE 20 MG/1
20 TABLET ORAL DAILY PRN
Qty: 30 TABLET | Refills: 5 | Status: SHIPPED | OUTPATIENT
Start: 2017-06-15 | End: 2017-10-02 | Stop reason: SDUPTHER

## 2017-06-15 RX ORDER — LISINOPRIL 2.5 MG/1
2.5 TABLET ORAL DAILY
Qty: 30 TABLET | Refills: 5 | Status: SHIPPED | OUTPATIENT
Start: 2017-06-15 | End: 2017-10-02 | Stop reason: SDUPTHER

## 2017-06-15 RX ORDER — CYANOCOBALAMIN 1000 UG/ML
1000 INJECTION, SOLUTION INTRAMUSCULAR; SUBCUTANEOUS ONCE
Status: COMPLETED | OUTPATIENT
Start: 2017-06-15 | End: 2017-06-16

## 2017-06-15 RX ORDER — ASPIRIN 325 MG
325 TABLET, DELAYED RELEASE (ENTERIC COATED) ORAL DAILY
Qty: 30 TABLET | Refills: 5 | Status: SHIPPED | OUTPATIENT
Start: 2017-06-15 | End: 2017-10-02 | Stop reason: SDUPTHER

## 2017-06-15 RX ORDER — METOPROLOL SUCCINATE 25 MG/1
12.5 TABLET, EXTENDED RELEASE ORAL DAILY
Qty: 30 TABLET | Refills: 11 | Status: SHIPPED | OUTPATIENT
Start: 2017-06-15 | End: 2017-10-02 | Stop reason: SDUPTHER

## 2017-06-15 RX ORDER — ATORVASTATIN CALCIUM 80 MG/1
80 TABLET, FILM COATED ORAL DAILY
Qty: 30 TABLET | Refills: 5 | Status: SHIPPED | OUTPATIENT
Start: 2017-06-15 | End: 2017-10-02 | Stop reason: SDUPTHER

## 2017-06-15 RX ORDER — CLOPIDOGREL BISULFATE 75 MG/1
75 TABLET ORAL DAILY
Qty: 30 TABLET | Refills: 5 | Status: SHIPPED | OUTPATIENT
Start: 2017-06-15 | End: 2017-12-21 | Stop reason: SDUPTHER

## 2017-06-15 RX ADMIN — CYANOCOBALAMIN 1000 MCG: 1000 INJECTION, SOLUTION INTRAMUSCULAR; SUBCUTANEOUS at 13:00

## 2017-06-15 NOTE — PROGRESS NOTES
Subjective   Ekaterina Verdin is a 42 y.o. female.     Chief Complaint   Patient presents with   • Anxiety   • Coronary Artery Disease   • Hyperlipidemia       History of Present Illness     MI  Pt is under the care of DR. Aponte following an MI. She reports doing well.     Anxiety  Pt experienced anxiety following her heart attack. She reports improvement in symptoms. No recent panic attacks. She has felt some mild anxiety this week due to her father in laws illness. She has not taken any Ativan and does not wish to refill it. Feels her symptoms are well controlled with Wellbutrin. Staying active with [employer] and community work.     Hyperlipidemia  Pt states she is working on her diet. Is compliant with Lipitor . She was not fasting for her triglyceride level last week which did show an increase as compared to previous level. She had drank cream in her coffee.     B-12 def  Pt has history of B 12 def. Has taken b-12 injections in the past.     The following portions of the patient's history were reviewed and updated as appropriate: allergies, current medications, past family history, past medical history, past social history, past surgical history and problem list.    Review of Systems   Constitutional: Negative for activity change, appetite change, chills, fatigue and fever.   HENT: Negative for ear pain, facial swelling, hearing loss, sinus pressure, sore throat, trouble swallowing and voice change.    Eyes: Negative for pain, discharge and visual disturbance.   Respiratory: Positive for shortness of breath (rarely has SOB, when she does taking a lasix resolves symptoms ). Negative for apnea, cough, chest tightness and wheezing.    Cardiovascular: Negative for chest pain, palpitations and leg swelling.   Gastrointestinal: Negative for abdominal pain, blood in stool, constipation, diarrhea, nausea and vomiting.   Endocrine: Negative.    Genitourinary: Negative for dysuria.   Musculoskeletal: Negative for arthralgias and  "neck stiffness.   Skin: Negative for color change.   Allergic/Immunologic: Positive for environmental allergies.   Neurological: Negative for headaches.   Psychiatric/Behavioral: Negative for confusion and suicidal ideas. The patient is not nervous/anxious.    All other systems reviewed and are negative.      Objective     /80 (BP Location: Left arm, Patient Position: Sitting, Cuff Size: Adult)  Pulse 72  Temp 98.7 °F (37.1 °C) (Oral)   Ht 61\" (154.9 cm)  Wt 183 lb (83 kg)  SpO2 97%  BMI 34.58 kg/m2  Lab on 06/06/2017   Component Date Value Ref Range Status   • Glucose 06/06/2017 127* 70 - 110 mg/dL Final   • BUN 06/06/2017 9  7 - 21 mg/dL Final   • Creatinine 06/06/2017 0.97  0.43 - 1.29 mg/dL Final   • Sodium 06/06/2017 138  135 - 153 mmol/L Final   • Potassium 06/06/2017 3.8  3.5 - 5.3 mmol/L Final   • Chloride 06/06/2017 105  99 - 112 mmol/L Final   • CO2 06/06/2017 28.8  24.3 - 31.9 mmol/L Final   • Calcium 06/06/2017 9.0  7.7 - 10.0 mg/dL Final   • Total Protein 06/06/2017 7.0  6.0 - 8.0 g/dL Final   • Albumin 06/06/2017 3.90  3.50 - 5.00 g/dL Final   • ALT (SGPT) 06/06/2017 26  10 - 36 U/L Final   • AST (SGOT) 06/06/2017 21  10 - 30 U/L Final   • Alkaline Phosphatase 06/06/2017 67  35 - 104 U/L Final   • Total Bilirubin 06/06/2017 0.3  0.2 - 1.8 mg/dL Final   • eGFR Non  Amer 06/06/2017 62  >60 mL/min/1.73 Final   • Globulin 06/06/2017 3.1  gm/dL Final   • A/G Ratio 06/06/2017 1.3* 1.5 - 2.5 g/dL Final   • BUN/Creatinine Ratio 06/06/2017 9.3  7.0 - 25.0 Final   • Anion Gap 06/06/2017 4.2  3.6 - 11.2 mmol/L Final   • TSH 06/06/2017 1.151  0.550 - 4.780 mIU/mL Final   • Hemoglobin A1C 06/06/2017 5.90* 4.50 - 5.70 % Final   • Total Cholesterol 06/06/2017 150  0 - 200 mg/dL Final   • Triglycerides 06/06/2017 227* 0 - 150 mg/dL Final   • HDL Cholesterol 06/06/2017 35* 60 - 100 mg/dL Final   • LDL Cholesterol  06/06/2017 70  0 - 100 mg/dL Final   • VLDL Cholesterol 06/06/2017 45.4  mg/dL Final "   • LDL/HDL Ratio 06/06/2017 1.99   Final   • Vitamin B-12 06/06/2017 204* 211 - 911 pg/mL Final   • WBC 06/06/2017 7.00  4.50 - 12.50 10*3/mm3 Final   • RBC 06/06/2017 4.16* 4.20 - 5.40 10*6/mm3 Final   • Hemoglobin 06/06/2017 12.8  12.0 - 16.0 g/dL Final   • Hematocrit 06/06/2017 39.1  37.0 - 47.0 % Final   • MCV 06/06/2017 94.0  80.0 - 94.0 fL Final   • MCH 06/06/2017 30.8  27.0 - 33.0 pg Final   • MCHC 06/06/2017 32.7* 33.0 - 37.0 g/dL Final   • RDW 06/06/2017 13.5  11.5 - 14.5 % Final   • RDW-SD 06/06/2017 46.1  37.0 - 54.0 fl Final   • MPV 06/06/2017 11.6* 6.0 - 10.0 fL Final   • Platelets 06/06/2017 273  130 - 400 10*3/mm3 Final   • Neutrophil % 06/06/2017 60.1  30.0 - 70.0 % Final   • Lymphocyte % 06/06/2017 32.4  21.0 - 51.0 % Final   • Monocyte % 06/06/2017 5.9  0.0 - 10.0 % Final   • Eosinophil % 06/06/2017 1.0  0.0 - 5.0 % Final   • Basophil % 06/06/2017 0.3  0.0 - 2.0 % Final   • Immature Grans % 06/06/2017 0.3  0.0 - 0.5 % Final   • Neutrophils, Absolute 06/06/2017 4.21  1.40 - 6.50 10*3/mm3 Final   • Lymphocytes, Absolute 06/06/2017 2.27  1.00 - 3.00 10*3/mm3 Final   • Monocytes, Absolute 06/06/2017 0.41  0.10 - 0.90 10*3/mm3 Final   • Eosinophils, Absolute 06/06/2017 0.07  0.00 - 0.70 10*3/mm3 Final   • Basophils, Absolute 06/06/2017 0.02  0.00 - 0.30 10*3/mm3 Final   • Immature Grans, Absolute 06/06/2017 0.02  0.00 - 0.03 10*3/mm3 Final   • Osmolality Calc 06/06/2017 275.9  273.0 - 305.0 mOsm/kg Final       Physical Exam   Constitutional: She is oriented to person, place, and time. She appears well-developed and well-nourished. No distress.   HENT:   Head: Normocephalic.   Right Ear: External ear normal.   Left Ear: External ear normal.   Nose: Nose normal.   Mouth/Throat: Oropharynx is clear and moist. No oropharyngeal exudate.   Eyes: Pupils are equal, round, and reactive to light.   Neck: Normal range of motion. Neck supple. No tracheal deviation present. No thyromegaly present.   Cardiovascular:  Normal rate, regular rhythm and normal heart sounds.  Exam reveals no gallop and no friction rub.    No murmur heard.  Pulmonary/Chest: Effort normal and breath sounds normal. No respiratory distress. She has no wheezes. She has no rales. She exhibits no tenderness.   Abdominal: Soft. Bowel sounds are normal. She exhibits no distension and no mass. There is no tenderness. There is no rebound and no guarding.   Musculoskeletal: Normal range of motion.   Lymphadenopathy:     She has no cervical adenopathy.   Neurological: She is alert and oriented to person, place, and time. She has normal reflexes.   CN 2-12 grossly intact    Skin: Skin is warm and dry. No rash noted. She is not diaphoretic. No erythema.   Psychiatric: She has a normal mood and affect. Her speech is normal and behavior is normal. Judgment and thought content normal. Cognition and memory are normal.   Vitals reviewed.      Assessment/Plan     Problem List Items Addressed This Visit        Cardiovascular and Mediastinum    Coronary artery disease involving native coronary artery of native heart without angina pectoris    Relevant Medications    clopidogrel (PLAVIX) 75 MG tablet    metoprolol succinate XL (TOPROL-XL) 25 MG 24 hr tablet    Mixed hyperlipidemia    Relevant Medications    atorvastatin (LIPITOR) 80 MG tablet    Other Relevant Orders    Lipid Panel       Other    Anxiety disorder due to general medical condition with panic attack    Relevant Medications    buPROPion XL (WELLBUTRIN XL) 150 MG 24 hr tablet      Other Visit Diagnoses     B12 deficiency    -  Primary    Relevant Medications    cyanocobalamin injection 1,000 mcg (Completed)        Labs reviewed and discussed.  Repeat fasting lipid panel tomorrow.   B-12 injections daily x 5 then weekly x 4 then monthly.  Refill routine medications.   I have discussed diagnosis in detail today allowing time for questions and answers. Pt is aware of reasons to seek urgent or emergent medical care  as well as reasons to return to the clinic for evaluation. Possible side effects, interactions and progression of symptoms discussed as well. Pt / family states understanding.   Emotional support and active listening provided.   Follow up 3 months, sooner if needed.              This document has been electronically signed by:  ILEANA James, NP-C

## 2017-06-16 ENCOUNTER — LAB (OUTPATIENT)
Dept: FAMILY MEDICINE CLINIC | Facility: CLINIC | Age: 42
End: 2017-06-16

## 2017-06-16 ENCOUNTER — APPOINTMENT (OUTPATIENT)
Dept: CARDIAC REHAB | Facility: HOSPITAL | Age: 42
End: 2017-06-16

## 2017-06-16 DIAGNOSIS — E78.2 MIXED HYPERLIPIDEMIA: ICD-10-CM

## 2017-06-16 LAB
CHOLEST SERPL-MCNC: 145 MG/DL (ref 0–200)
HDLC SERPL-MCNC: 29 MG/DL (ref 60–100)
LDLC SERPL CALC-MCNC: 72 MG/DL (ref 0–100)
LDLC/HDLC SERPL: 2.5 {RATIO}
TRIGL SERPL-MCNC: 218 MG/DL (ref 0–150)
VLDLC SERPL-MCNC: 43.6 MG/DL

## 2017-06-16 PROCEDURE — 96372 THER/PROPH/DIAG INJ SC/IM: CPT | Performed by: NURSE PRACTITIONER

## 2017-06-16 PROCEDURE — 80061 LIPID PANEL: CPT | Performed by: NURSE PRACTITIONER

## 2017-06-16 PROCEDURE — 36415 COLL VENOUS BLD VENIPUNCTURE: CPT

## 2017-06-16 RX ADMIN — CYANOCOBALAMIN 1000 MCG: 1000 INJECTION, SOLUTION INTRAMUSCULAR; SUBCUTANEOUS at 09:06

## 2017-06-19 ENCOUNTER — CLINICAL SUPPORT (OUTPATIENT)
Dept: FAMILY MEDICINE CLINIC | Facility: CLINIC | Age: 42
End: 2017-06-19

## 2017-06-19 DIAGNOSIS — R53.83 FATIGUE, UNSPECIFIED TYPE: Primary | ICD-10-CM

## 2017-06-19 PROCEDURE — 96372 THER/PROPH/DIAG INJ SC/IM: CPT | Performed by: NURSE PRACTITIONER

## 2017-06-19 RX ORDER — CYANOCOBALAMIN 1000 UG/ML
1000 INJECTION, SOLUTION INTRAMUSCULAR; SUBCUTANEOUS
Status: DISCONTINUED | OUTPATIENT
Start: 2017-06-19 | End: 2018-06-25

## 2017-06-19 RX ADMIN — CYANOCOBALAMIN 1000 MCG: 1000 INJECTION, SOLUTION INTRAMUSCULAR; SUBCUTANEOUS at 09:33

## 2017-06-27 ENCOUNTER — CLINICAL SUPPORT (OUTPATIENT)
Dept: FAMILY MEDICINE CLINIC | Facility: CLINIC | Age: 42
End: 2017-06-27

## 2017-06-27 DIAGNOSIS — E53.8 VITAMIN B12 DEFICIENCY: ICD-10-CM

## 2017-06-27 PROCEDURE — 96372 THER/PROPH/DIAG INJ SC/IM: CPT | Performed by: NURSE PRACTITIONER

## 2017-06-27 RX ADMIN — CYANOCOBALAMIN 1000 MCG: 1000 INJECTION, SOLUTION INTRAMUSCULAR; SUBCUTANEOUS at 11:39

## 2017-06-28 ENCOUNTER — CLINICAL SUPPORT (OUTPATIENT)
Dept: FAMILY MEDICINE CLINIC | Facility: CLINIC | Age: 42
End: 2017-06-28

## 2017-06-28 DIAGNOSIS — E53.8 VITAMIN B12 DEFICIENCY: ICD-10-CM

## 2017-06-28 PROCEDURE — 96372 THER/PROPH/DIAG INJ SC/IM: CPT | Performed by: NURSE PRACTITIONER

## 2017-06-28 RX ADMIN — CYANOCOBALAMIN 1000 MCG: 1000 INJECTION, SOLUTION INTRAMUSCULAR; SUBCUTANEOUS at 11:58

## 2017-06-29 ENCOUNTER — CLINICAL SUPPORT (OUTPATIENT)
Dept: FAMILY MEDICINE CLINIC | Facility: CLINIC | Age: 42
End: 2017-06-29

## 2017-06-29 DIAGNOSIS — E53.8 VITAMIN B12 DEFICIENCY: ICD-10-CM

## 2017-06-29 PROCEDURE — 96372 THER/PROPH/DIAG INJ SC/IM: CPT | Performed by: NURSE PRACTITIONER

## 2017-06-29 RX ADMIN — CYANOCOBALAMIN 1000 MCG: 1000 INJECTION, SOLUTION INTRAMUSCULAR; SUBCUTANEOUS at 15:26

## 2017-06-30 ENCOUNTER — CLINICAL SUPPORT (OUTPATIENT)
Dept: FAMILY MEDICINE CLINIC | Facility: CLINIC | Age: 42
End: 2017-06-30

## 2017-06-30 DIAGNOSIS — E78.2 MIXED HYPERLIPIDEMIA: ICD-10-CM

## 2017-06-30 DIAGNOSIS — F06.4 ANXIETY DISORDER DUE TO GENERAL MEDICAL CONDITION WITH PANIC ATTACK: ICD-10-CM

## 2017-06-30 DIAGNOSIS — A04.8 HELICOBACTER PYLORI (H. PYLORI) INFECTION: ICD-10-CM

## 2017-06-30 DIAGNOSIS — I25.10 CORONARY ARTERY DISEASE INVOLVING NATIVE CORONARY ARTERY OF NATIVE HEART WITHOUT ANGINA PECTORIS: ICD-10-CM

## 2017-06-30 DIAGNOSIS — E66.9 OBESITY (BMI 30-39.9): ICD-10-CM

## 2017-06-30 DIAGNOSIS — Z72.0 TOBACCO ABUSE: ICD-10-CM

## 2017-06-30 DIAGNOSIS — F41.0 ANXIETY DISORDER DUE TO GENERAL MEDICAL CONDITION WITH PANIC ATTACK: ICD-10-CM

## 2017-06-30 DIAGNOSIS — E53.8 VITAMIN B12 DEFICIENCY: ICD-10-CM

## 2017-06-30 PROCEDURE — 96372 THER/PROPH/DIAG INJ SC/IM: CPT | Performed by: NURSE PRACTITIONER

## 2017-06-30 RX ADMIN — CYANOCOBALAMIN 1000 MCG: 1000 INJECTION, SOLUTION INTRAMUSCULAR; SUBCUTANEOUS at 10:27

## 2017-07-06 ENCOUNTER — CLINICAL SUPPORT (OUTPATIENT)
Dept: FAMILY MEDICINE CLINIC | Facility: CLINIC | Age: 42
End: 2017-07-06

## 2017-07-06 DIAGNOSIS — E53.8 VITAMIN B12 DEFICIENCY: ICD-10-CM

## 2017-07-06 PROCEDURE — 96372 THER/PROPH/DIAG INJ SC/IM: CPT | Performed by: NURSE PRACTITIONER

## 2017-07-06 RX ADMIN — CYANOCOBALAMIN 1000 MCG: 1000 INJECTION, SOLUTION INTRAMUSCULAR; SUBCUTANEOUS at 12:28

## 2017-07-12 ENCOUNTER — CLINICAL SUPPORT (OUTPATIENT)
Dept: FAMILY MEDICINE CLINIC | Facility: CLINIC | Age: 42
End: 2017-07-12

## 2017-07-12 DIAGNOSIS — E53.8 VITAMIN B12 DEFICIENCY: ICD-10-CM

## 2017-07-12 PROCEDURE — 96372 THER/PROPH/DIAG INJ SC/IM: CPT | Performed by: NURSE PRACTITIONER

## 2017-07-12 RX ADMIN — CYANOCOBALAMIN 1000 MCG: 1000 INJECTION, SOLUTION INTRAMUSCULAR; SUBCUTANEOUS at 13:21

## 2017-07-24 ENCOUNTER — CLINICAL SUPPORT (OUTPATIENT)
Dept: FAMILY MEDICINE CLINIC | Facility: CLINIC | Age: 42
End: 2017-07-24

## 2017-07-24 DIAGNOSIS — E53.8 VITAMIN B12 DEFICIENCY: Primary | ICD-10-CM

## 2017-07-24 PROCEDURE — 96372 THER/PROPH/DIAG INJ SC/IM: CPT | Performed by: NURSE PRACTITIONER

## 2017-07-24 RX ORDER — CYANOCOBALAMIN 1000 UG/ML
1000 INJECTION, SOLUTION INTRAMUSCULAR; SUBCUTANEOUS
Status: DISCONTINUED | OUTPATIENT
Start: 2017-07-24 | End: 2017-07-24

## 2017-07-24 RX ADMIN — CYANOCOBALAMIN 1000 MCG: 1000 INJECTION, SOLUTION INTRAMUSCULAR; SUBCUTANEOUS at 14:37

## 2017-08-02 ENCOUNTER — CLINICAL SUPPORT (OUTPATIENT)
Dept: FAMILY MEDICINE CLINIC | Facility: CLINIC | Age: 42
End: 2017-08-02

## 2017-08-02 DIAGNOSIS — E53.8 VITAMIN B 12 DEFICIENCY: ICD-10-CM

## 2017-08-02 PROCEDURE — 96372 THER/PROPH/DIAG INJ SC/IM: CPT | Performed by: NURSE PRACTITIONER

## 2017-08-02 RX ADMIN — CYANOCOBALAMIN 1000 MCG: 1000 INJECTION, SOLUTION INTRAMUSCULAR; SUBCUTANEOUS at 14:23

## 2017-08-15 ENCOUNTER — OFFICE VISIT (OUTPATIENT)
Dept: FAMILY MEDICINE CLINIC | Facility: CLINIC | Age: 42
End: 2017-08-15

## 2017-08-15 VITALS
BODY MASS INDEX: 33.99 KG/M2 | OXYGEN SATURATION: 97 % | HEIGHT: 61 IN | DIASTOLIC BLOOD PRESSURE: 80 MMHG | SYSTOLIC BLOOD PRESSURE: 142 MMHG | HEART RATE: 70 BPM | WEIGHT: 180 LBS | TEMPERATURE: 97.1 F

## 2017-08-15 DIAGNOSIS — M25.562 ACUTE PAIN OF LEFT KNEE: Primary | ICD-10-CM

## 2017-08-15 PROCEDURE — 99213 OFFICE O/P EST LOW 20 MIN: CPT | Performed by: NURSE PRACTITIONER

## 2017-08-15 PROCEDURE — 96372 THER/PROPH/DIAG INJ SC/IM: CPT | Performed by: NURSE PRACTITIONER

## 2017-08-15 PROCEDURE — A6448 LT COMPRES BAND <3"/YD: HCPCS | Performed by: NURSE PRACTITIONER

## 2017-08-15 RX ORDER — KETOROLAC TROMETHAMINE 30 MG/ML
60 INJECTION, SOLUTION INTRAMUSCULAR; INTRAVENOUS ONCE
Status: COMPLETED | OUTPATIENT
Start: 2017-08-15 | End: 2017-08-15

## 2017-08-15 RX ORDER — IBUPROFEN 400 MG/1
400 TABLET ORAL EVERY 8 HOURS PRN
Qty: 60 TABLET | Refills: 1 | Status: SHIPPED | OUTPATIENT
Start: 2017-08-15 | End: 2017-10-02

## 2017-08-15 RX ADMIN — KETOROLAC TROMETHAMINE 60 MG: 30 INJECTION, SOLUTION INTRAMUSCULAR; INTRAVENOUS at 09:44

## 2017-08-15 NOTE — PROGRESS NOTES
"Teofilo Verdin is a 42 y.o. female.     Chief Complaint   Patient presents with   • Pain       History of Present Illness     Patient is here today with complaint of acute left knee pain.  She reports she was on her feet most of the day Sunday during a [employer] event.  Being on her feet seem to exacerbate the pain which originally started the day before.  Pain initially started as just some muscle cramps in her left leg radiating from her knee up into her thigh.  Patient denies any injury.  She reports that her left knee is tender along the lateral aspect.  She has no bruising or erythema.  She is taking an aspirin a day and Plavix due to history of MI.  Pain is eased when she stands her name and remains off her feet.  She reports the pain has decreased some today.      The following portions of the patient's history were reviewed and updated as appropriate: allergies, current medications, past family history, past medical history, past social history, past surgical history and problem list.    Review of Systems   Constitutional: Positive for activity change. Negative for fatigue.   Respiratory: Negative for chest tightness, shortness of breath and wheezing.    Cardiovascular: Negative for chest pain and palpitations.   Musculoskeletal: Positive for arthralgias.   All other systems reviewed and are negative.      Objective     /80 (BP Location: Right arm, Patient Position: Sitting, Cuff Size: Adult)  Pulse 70  Temp 97.1 °F (36.2 °C) (Tympanic)   Ht 61\" (154.9 cm)  Wt 180 lb (81.6 kg)  SpO2 97%  BMI 34.01 kg/m2  Lab on 06/16/2017   Component Date Value Ref Range Status   • Total Cholesterol 06/16/2017 145  0 - 200 mg/dL Final   • Triglycerides 06/16/2017 218* 0 - 150 mg/dL Final   • HDL Cholesterol 06/16/2017 29* 60 - 100 mg/dL Final   • LDL Cholesterol  06/16/2017 72  0 - 100 mg/dL Final   • VLDL Cholesterol 06/16/2017 43.6  mg/dL Final   • LDL/HDL Ratio 06/16/2017 2.50   Final       Physical Exam "   Constitutional: She appears well-developed and well-nourished. No distress.   HENT:   Head: Atraumatic.   Eyes: Pupils are equal, round, and reactive to light.   Neck: Neck supple.   Cardiovascular: Normal rate, regular rhythm and normal heart sounds.    Pulmonary/Chest: Effort normal and breath sounds normal. No respiratory distress.   Abdominal: Soft. Bowel sounds are normal. She exhibits no distension.   Musculoskeletal:        Left knee: She exhibits normal range of motion, no ecchymosis, no deformity, no erythema and normal alignment. Tenderness found. Lateral joint line tenderness noted.   2 inch Ace wrap was applied to the left knee.   Lymphadenopathy:     She has no cervical adenopathy.   Neurological: She is alert.   Skin: Skin is warm and dry. She is not diaphoretic. No erythema.   Psychiatric: She has a normal mood and affect. Her speech is normal and behavior is normal. Judgment and thought content normal. Cognition and memory are normal.   Vitals reviewed.      Assessment/Plan     Problem List Items Addressed This Visit     None      Visit Diagnoses     Acute pain of left knee    -  Primary    Relevant Medications    ketorolac (TORADOL) injection 60 mg (Start on 8/15/2017  9:30 AM)    Other Relevant Orders    XR Knee 3 View Left        Patient has been counseled regarding the possible interaction between Motrin/Toradol and her current routine medications of Plavix and aspirin.  She has been advised to only take the Motrin for 2-3 days for this acute pain.  Body mechanics have been reviewed.  I've recommended that she avoid overuse of her left knee today.  Rest with knee resting on a small pillow for support.  Ace wrap has been applied to the left knee to provide support.  She's been given order for an x-ray of the left knee to be performed at the local [facility].  We reviewed the signs of DVT for which she is to seek immediate medical attention.  RTC 2-5 days if not improved, sooner if condition  worsens/changes. Symptomatic care advised as well as reasons for urgent or emergent care. Pt / family state understanding.   Will refer as indicated to orthopedic consult or physical therapy.             This document has been electronically signed by:  ILEANA James, NP-C

## 2017-08-21 ENCOUNTER — TELEPHONE (OUTPATIENT)
Dept: FAMILY MEDICINE CLINIC | Facility: CLINIC | Age: 42
End: 2017-08-21

## 2017-08-23 ENCOUNTER — OFFICE VISIT (OUTPATIENT)
Dept: CARDIOLOGY | Facility: CLINIC | Age: 42
End: 2017-08-23

## 2017-08-23 ENCOUNTER — HOSPITAL ENCOUNTER (OUTPATIENT)
Dept: RESPIRATORY THERAPY | Facility: HOSPITAL | Age: 42
Discharge: HOME | End: 2017-08-23
Attending: INTERNAL MEDICINE | Admitting: INTERNAL MEDICINE

## 2017-08-23 VITALS
DIASTOLIC BLOOD PRESSURE: 82 MMHG | SYSTOLIC BLOOD PRESSURE: 152 MMHG | WEIGHT: 182.2 LBS | OXYGEN SATURATION: 98 % | BODY MASS INDEX: 33.53 KG/M2 | HEIGHT: 62 IN | HEART RATE: 57 BPM

## 2017-08-23 DIAGNOSIS — R00.2 PALPITATIONS: ICD-10-CM

## 2017-08-23 DIAGNOSIS — E78.2 MIXED HYPERLIPIDEMIA: ICD-10-CM

## 2017-08-23 DIAGNOSIS — I25.10 CORONARY ARTERY DISEASE INVOLVING NATIVE CORONARY ARTERY OF NATIVE HEART WITHOUT ANGINA PECTORIS: Primary | ICD-10-CM

## 2017-08-23 DIAGNOSIS — E66.9 OBESITY (BMI 30-39.9): ICD-10-CM

## 2017-08-23 PROCEDURE — 93227 XTRNL ECG REC<48 HR R&I: CPT | Performed by: INTERNAL MEDICINE

## 2017-08-23 PROCEDURE — 93225 XTRNL ECG REC<48 HRS REC: CPT

## 2017-08-23 PROCEDURE — 93226 XTRNL ECG REC<48 HR SCAN A/R: CPT

## 2017-08-23 PROCEDURE — 99213 OFFICE O/P EST LOW 20 MIN: CPT | Performed by: INTERNAL MEDICINE

## 2017-08-23 NOTE — PROGRESS NOTES
Subjective     Chief Complaint: Follow-up; Coronary Artery Disease; Shortness of Breath; and Hyperlipidemia    History of Present Illness     Ekaterina Verdin is a 42 y.o. female who presents for follow-up of known coronary artery disease. She presented in September 2016 with an acute inferior wall myocardial infarction and was taken emergently to the cardiac catheterization laboratory and underwent drug-eluting stent implantation at that time.  She did have an echocardiogram performed during her hospitalization which was normal.  She participated in cardiac rehab and also counseling since some of her chest pain had an anxiety component to it.      Today she complains of palpitations, particularly at night.  They seem to take her breath away.  She denies chest pain, shortness of air, syncope or near syncope.  She denies significant bruising and bleeding.  She has been compliant with her medication regimen.  She had a recent treatment for an injury to her knee which has affected her ability to exercise regularly.  The knee is better now and she plans to resume exercise.      See ROS    Current Outpatient Prescriptions:   •  aspirin  MG tablet, Take 1 tablet by mouth Daily., Disp: 30 tablet, Rfl: 5  •  atorvastatin (LIPITOR) 80 MG tablet, Take 1 tablet by mouth Daily., Disp: 30 tablet, Rfl: 5  •  buPROPion XL (WELLBUTRIN XL) 150 MG 24 hr tablet, Take 1 tablet by mouth Daily., Disp: 30 tablet, Rfl: 3  •  clopidogrel (PLAVIX) 75 MG tablet, Take 1 tablet by mouth Daily., Disp: 30 tablet, Rfl: 5  •  furosemide (LASIX) 20 MG tablet, Take 1 tablet by mouth Daily As Needed (swelling)., Disp: 30 tablet, Rfl: 5  •  ibuprofen (ADVIL,MOTRIN) 400 MG tablet, Take 1 tablet by mouth Every 8 (Eight) Hours As Needed for Mild Pain  or Moderate Pain ., Disp: 60 tablet, Rfl: 1  •  lisinopril (PRINIVIL,ZESTRIL) 2.5 MG tablet, Take 1 tablet by mouth Daily., Disp: 30 tablet, Rfl: 5  •  metoprolol succinate XL (TOPROL-XL) 25 MG 24 hr tablet,  "Take 0.5 tablets by mouth Daily., Disp: 30 tablet, Rfl: 11  •  mupirocin (BACTROBAN) 2 % ointment, Apply  topically 3 (Three) Times a Day., Disp: 1 each, Rfl: 5  •  omeprazole (priLOSEC) 40 MG capsule, Take 1 capsule by mouth Daily., Disp: 30 capsule, Rfl: 5  •  potassium chloride (K-DUR,KLOR-CON) 10 MEQ CR tablet, Take 1 tablet by mouth Daily. Take only when take lasix, Disp: 30 tablet, Rfl: 5    Current Facility-Administered Medications:   •  cyanocobalamin injection 1,000 mcg, 1,000 mcg, Intramuscular, Q28 Days, ILEANA Angel, 1,000 mcg at 08/02/17 1423     The following portions of the patient's history were reviewed and updated as appropriate: allergies, current medications, past family history, past medical history, past social history, past surgical history and problem list.    Review of Systems   Constitutional: Positive for fatigue.   Respiratory: Negative for shortness of breath.    Cardiovascular: Positive for palpitations. Negative for chest pain and leg swelling.   Gastrointestinal: Negative for blood in stool.   Neurological: Negative for dizziness, syncope, weakness and light-headedness.   Hematological: Does not bruise/bleed easily.       Objective      /82 (BP Location: Left arm)  Pulse 57  Ht 62\" (157.5 cm)  Wt 182 lb 3.2 oz (82.6 kg)  SpO2 98%  BMI 33.32 kg/m2    Physical Exam   Constitutional: She appears well-developed and well-nourished.   HENT:   Head: Normocephalic and atraumatic.   Eyes: Pupils are equal, round, and reactive to light.   Neck: No JVD present.   Cardiovascular: Normal rate, regular rhythm, S1 normal, S2 normal and intact distal pulses.   Occasional extrasystoles are present. Exam reveals no gallop and no friction rub.    No murmur heard.  Pulses:       Dorsalis pedis pulses are 2+ on the right side, and 2+ on the left side.        Posterior tibial pulses are 2+ on the right side, and 2+ on the left side.   No lower extremity edema "   Pulmonary/Chest: Effort normal and breath sounds normal. No respiratory distress. She has no wheezes. She has no rales.   Abdominal: Soft. She exhibits no mass. There is no tenderness. No hernia.   Skin: Skin is warm and dry.   Psychiatric: She has a normal mood and affect.       Procedures       Assessment/Plan   Ekaterina was seen today for follow-up, coronary artery disease, shortness of breath and hyperlipidemia.    Diagnoses and all orders for this visit:    Coronary artery disease involving native coronary artery of native heart without angina pectoris     Ms Verdin's coronary artery disease is stable at this time.  There are no changes to her medication regimen.      Mixed hyperlipidemia      I have reviewed and discussed the latest lipid panel for Ms Verdin.  She is tolerating her statin therapy which is at its maximum right now.  She is trying to eat a heart healthy diet and although a recent knee injury has affected her activity level, she is planning on resuming her exercise routine.       Obesity (BMI 30-39.9)    Palpitations    -     Holter Monitor - 48 Hour     I have ordered a Holter Monitor for Ms Verdin's complaint of palpitations.  I suspect that these are benign ectopic beats.  She has agreed to this.  I will call her with the results and see her back at this office sooner if necessary.    Return to clinic in 6 months.      ILEANA Cutler

## 2017-08-25 ENCOUNTER — TELEPHONE (OUTPATIENT)
Dept: FAMILY MEDICINE CLINIC | Facility: CLINIC | Age: 42
End: 2017-08-25

## 2017-08-25 NOTE — TELEPHONE ENCOUNTER
----- Message from ILEANA Angel sent at 8/21/2017  8:40 AM EDT -----  Regarding: FW: Test Results Question  Contact: [phone]  I need this xray result please. May be at [facility].   ----- Message -----     From: Ekaterina May     Sent: 8/18/2017   8:11 AM       To: ILEANA Angel  Subject: RE: Test Results Question                        ......    ----- Message -----     From: Ekaterina May     Sent: 8/18/2017   7:56 AM       To: Elsy Yanes [facility]  Subject: Test Results Question                            Wanted to let you know that my knee is much better. Thank you! It popped really loud later that night after I seen you and started feeling better. It has popped a couple times since then just not as loud lol. Did the x-ray show anything?  Thank you again .

## 2017-08-25 NOTE — TELEPHONE ENCOUNTER
----- Message from ILEANA Angel sent at 8/21/2017  8:40 AM EDT -----  Regarding: FW: Test Results Question  Contact: [phone]  I need this xray result please. May be at [facility].   ----- Message -----     From: Ekaterina May     Sent: 8/18/2017   8:11 AM       To: ILEANA Angel  Subject: RE: Test Results Question                        ......    ----- Message -----     From: Ekaterina May     Sent: 8/18/2017   7:56 AM       To: Elsy Yanes [facility]  Subject: Test Results Question                            Wanted to let you know that my knee is much better. Thank you! It popped really loud later that night after I seen you and started feeling better. It has popped a couple times since then just not as loud lol. Did the x-ray show anything?  Thank you again .

## 2017-08-31 ENCOUNTER — TELEPHONE (OUTPATIENT)
Dept: CARDIOLOGY | Facility: CLINIC | Age: 42
End: 2017-08-31

## 2017-09-22 ENCOUNTER — CLINICAL SUPPORT (OUTPATIENT)
Dept: FAMILY MEDICINE CLINIC | Facility: CLINIC | Age: 42
End: 2017-09-22

## 2017-09-22 DIAGNOSIS — R53.83 OTHER FATIGUE: ICD-10-CM

## 2017-09-22 PROCEDURE — 96372 THER/PROPH/DIAG INJ SC/IM: CPT | Performed by: NURSE PRACTITIONER

## 2017-09-22 RX ADMIN — CYANOCOBALAMIN 1000 MCG: 1000 INJECTION, SOLUTION INTRAMUSCULAR; SUBCUTANEOUS at 15:32

## 2017-09-26 RX ORDER — BUPROPION HYDROCHLORIDE 150 MG/1
TABLET ORAL
Qty: 30 TABLET | Refills: 3 | Status: SHIPPED | OUTPATIENT
Start: 2017-09-26 | End: 2017-10-02 | Stop reason: SDUPTHER

## 2017-10-02 ENCOUNTER — OFFICE VISIT (OUTPATIENT)
Dept: FAMILY MEDICINE CLINIC | Facility: CLINIC | Age: 42
End: 2017-10-02

## 2017-10-02 VITALS
HEART RATE: 64 BPM | HEIGHT: 62 IN | TEMPERATURE: 98.5 F | SYSTOLIC BLOOD PRESSURE: 120 MMHG | BODY MASS INDEX: 33.31 KG/M2 | WEIGHT: 181 LBS | OXYGEN SATURATION: 97 % | DIASTOLIC BLOOD PRESSURE: 75 MMHG

## 2017-10-02 DIAGNOSIS — F06.4 ANXIETY DISORDER DUE TO GENERAL MEDICAL CONDITION WITH PANIC ATTACK: Primary | ICD-10-CM

## 2017-10-02 DIAGNOSIS — F41.0 ANXIETY DISORDER DUE TO GENERAL MEDICAL CONDITION WITH PANIC ATTACK: Primary | ICD-10-CM

## 2017-10-02 DIAGNOSIS — A04.8 HELICOBACTER PYLORI (H. PYLORI) INFECTION: ICD-10-CM

## 2017-10-02 DIAGNOSIS — E78.2 MIXED HYPERLIPIDEMIA: ICD-10-CM

## 2017-10-02 DIAGNOSIS — H61.21 EXCESSIVE CERUMEN IN RIGHT EAR CANAL: ICD-10-CM

## 2017-10-02 DIAGNOSIS — E66.9 OBESITY (BMI 30-39.9): ICD-10-CM

## 2017-10-02 DIAGNOSIS — I25.10 CVD (CARDIOVASCULAR DISEASE): ICD-10-CM

## 2017-10-02 PROCEDURE — 99214 OFFICE O/P EST MOD 30 MIN: CPT | Performed by: NURSE PRACTITIONER

## 2017-10-02 PROCEDURE — 69210 REMOVE IMPACTED EAR WAX UNI: CPT | Performed by: NURSE PRACTITIONER

## 2017-10-02 RX ORDER — OMEPRAZOLE 40 MG/1
40 CAPSULE, DELAYED RELEASE ORAL DAILY
Qty: 30 CAPSULE | Refills: 5 | Status: SHIPPED | OUTPATIENT
Start: 2017-10-02 | End: 2018-06-25 | Stop reason: SDUPTHER

## 2017-10-02 RX ORDER — POTASSIUM CHLORIDE 750 MG/1
10 TABLET, EXTENDED RELEASE ORAL DAILY
Qty: 30 TABLET | Refills: 5 | Status: SHIPPED | OUTPATIENT
Start: 2017-10-02 | End: 2018-06-25 | Stop reason: SDUPTHER

## 2017-10-02 RX ORDER — BUPROPION HYDROCHLORIDE 150 MG/1
TABLET ORAL
Qty: 30 TABLET | Refills: 3 | Status: SHIPPED | OUTPATIENT
Start: 2017-10-02 | End: 2018-01-22 | Stop reason: SDUPTHER

## 2017-10-02 RX ORDER — LISINOPRIL 2.5 MG/1
2.5 TABLET ORAL DAILY
Qty: 30 TABLET | Refills: 5 | Status: SHIPPED | OUTPATIENT
Start: 2017-10-02 | End: 2018-02-21 | Stop reason: SDUPTHER

## 2017-10-02 RX ORDER — METOPROLOL SUCCINATE 25 MG/1
12.5 TABLET, EXTENDED RELEASE ORAL DAILY
Qty: 30 TABLET | Refills: 11 | Status: SHIPPED | OUTPATIENT
Start: 2017-10-02 | End: 2018-06-25 | Stop reason: SDUPTHER

## 2017-10-02 RX ORDER — FUROSEMIDE 20 MG/1
20 TABLET ORAL DAILY PRN
Qty: 30 TABLET | Refills: 5 | Status: SHIPPED | OUTPATIENT
Start: 2017-10-02 | End: 2018-06-25 | Stop reason: SDUPTHER

## 2017-10-02 RX ORDER — ATORVASTATIN CALCIUM 80 MG/1
80 TABLET, FILM COATED ORAL DAILY
Qty: 30 TABLET | Refills: 5 | Status: SHIPPED | OUTPATIENT
Start: 2017-10-02 | End: 2017-10-23 | Stop reason: ALTCHOICE

## 2017-10-02 RX ORDER — ASPIRIN 325 MG
325 TABLET, DELAYED RELEASE (ENTERIC COATED) ORAL DAILY
Qty: 30 TABLET | Refills: 5 | Status: SHIPPED | OUTPATIENT
Start: 2017-10-02 | End: 2018-06-25 | Stop reason: SDUPTHER

## 2017-10-02 RX ORDER — LORATADINE 10 MG/1
10 TABLET ORAL DAILY PRN
Qty: 30 TABLET | Refills: 5 | Status: SHIPPED | OUTPATIENT
Start: 2017-10-02 | End: 2018-06-25 | Stop reason: SDUPTHER

## 2017-10-02 NOTE — PROGRESS NOTES
Subjective   Ekaterina Verdin is a 42 y.o. female.     Chief Complaint   Patient presents with   • Coronary Artery Disease   • Hyperlipidemia       History of Present Illness     Cardiovascular disease/history of MI in 2016.  She is under the care of [facility] cardiology.  She has had a recent cardiology exam.  Patient recently underwent Holter monitor with normal findings.  Having some feelings of heart flutter at times which was the reason for the recent Holter monitor.  She denies any chest pain or shortness of breath.  Patient currently receives Plavix 75 mg daily.  She is also on aspirin 325 mg daily.  Denies any abnormal bleeding.  Blood pressure has been well maintained with current beta blocker and lisinopril.  Patient also received Lasix 20 mg daily when necessary for edema.    Hyperlipidemia-patient reports that she is compliant with her Lipitor.    Seasonal allergies-patient reports that she is having some sneezing and throat irritation.  The symptoms started approximately 2 weeks ago with the onset of fall allergies.  No fever.      Reactive anxiety with depression-patient reports she is sleeping well.  Patient reports that she is stable on current Wellbutrin 150 mg XL daily.  She denies any thoughts of hurting self or others.  She is trying to stay busy with her grand children and family.     GERD-prostate 40 mg 1 by mouth daily controls her symptoms.  History of H. pylori infection with no recent exacerbation.          The following portions of the patient's history were reviewed and updated as appropriate: allergies, current medications, past family history, past medical history, past social history, past surgical history and problem list.    Review of Systems   Constitutional: Positive for fatigue. Negative for activity change, appetite change, chills, fever and unexpected weight change.   HENT: Positive for ear discharge, sneezing and sore throat.    Eyes: Negative.    Respiratory: Negative for cough, chest  "tightness, shortness of breath and wheezing.    Cardiovascular: Negative for chest pain, palpitations and leg swelling.   Gastrointestinal: Negative for abdominal pain, blood in stool, constipation, diarrhea, nausea and vomiting.   Endocrine: Negative.    Genitourinary: Negative for dysuria and flank pain.   Musculoskeletal: Positive for arthralgias. Negative for neck pain and neck stiffness.   Skin: Negative for rash.   Allergic/Immunologic: Positive for environmental allergies.   Neurological: Negative for dizziness, tremors, seizures, syncope, facial asymmetry, speech difficulty, weakness, numbness and headaches.   Hematological: Negative.    Psychiatric/Behavioral: Negative for dysphoric mood, self-injury and suicidal ideas. The patient is not nervous/anxious.    All other systems reviewed and are negative.      Objective     /75 (BP Location: Left arm, Patient Position: Sitting)  Pulse 64  Temp 98.5 °F (36.9 °C) (Tympanic)   Ht 62\" (157.5 cm)  Wt 181 lb (82.1 kg)  SpO2 97%  BMI 33.11 kg/m2  Lab on 06/16/2017   Component Date Value Ref Range Status   • Total Cholesterol 06/16/2017 145  0 - 200 mg/dL Final   • Triglycerides 06/16/2017 218* 0 - 150 mg/dL Final   • HDL Cholesterol 06/16/2017 29* 60 - 100 mg/dL Final   • LDL Cholesterol  06/16/2017 72  0 - 100 mg/dL Final   • VLDL Cholesterol 06/16/2017 43.6  mg/dL Final   • LDL/HDL Ratio 06/16/2017 2.50   Final       Physical Exam   Constitutional: She is oriented to person, place, and time. She appears well-developed and well-nourished. No distress.   HENT:   Head: Normocephalic.   Right Ear: Tympanic membrane and external ear normal. There is drainage.   Left Ear: Tympanic membrane and external ear normal. No drainage.   Nose: Nose normal.   Mouth/Throat: Mucous membranes are normal. Oropharyngeal exudate (clear drainage) present.   Right ear canal is covered with thick brown cerumen.  Cerumen removed during physical exam with use of a flex loop " instrument.   Eyes: Pupils are equal, round, and reactive to light.   Neck: Normal range of motion. Neck supple. No tracheal deviation present. No thyromegaly present.   Cardiovascular: Normal rate, regular rhythm and normal heart sounds.  Exam reveals no gallop and no friction rub.    No murmur heard.  Pulmonary/Chest: Effort normal and breath sounds normal. No respiratory distress. She has no wheezes. She has no rales. She exhibits no tenderness.   Abdominal: Soft. Bowel sounds are normal. She exhibits no distension and no mass. There is no tenderness. There is no rebound and no guarding.   Musculoskeletal: Normal range of motion.   Lymphadenopathy:     She has no cervical adenopathy.   Neurological: She is alert and oriented to person, place, and time. She has normal reflexes.   CN 2-12 grossly intact    Skin: Skin is warm and dry. No rash noted. She is not diaphoretic. No erythema.   Psychiatric: She has a normal mood and affect. Her speech is normal and behavior is normal. Judgment and thought content normal. Cognition and memory are normal.   Vitals reviewed.      Assessment/Plan     Problem List Items Addressed This Visit        Cardiovascular and Mediastinum    Mixed hyperlipidemia    Relevant Medications    atorvastatin (LIPITOR) 80 MG tablet    CVD (cardiovascular disease)    Overview     History of MI 2016         Relevant Orders    CBC & Differential    Comprehensive Metabolic Panel    TSH    Vitamin B12    MicroAlbumin, Urine, Random - Urine, Clean Catch    Hemoglobin A1c    Vitamin D 25 Hydroxy    Lipid Panel       Digestive    Obesity (BMI 30-39.9)       Nervous and Auditory    Excessive cerumen in right ear canal       Other    Anxiety disorder due to general medical condition with panic attack - Primary    Helicobacter pylori (H. pylori) infection        I have discussed diagnosis in detail today allowing time for questions and answers. Pt is aware of reasons to seek urgent or emergent medical care  as well as reasons to return to the clinic for evaluation. Possible side effects, interactions and progression of symptoms discussed as well. Pt / family states understanding.   Emotional support and active listening provided.   Recent Holter monitor has been reviewed and findings discussed.  Encouraged patient to limit saturated fat in her diet and exercise daily as tolerated.  Weight loss is recommended.     recommend influenza vaccine.  Patient will receive influenza vaccine at her pharmacy or return next week when available in facility.  Avoid Q-tips.  Start Claritin.  Report failure of improvement with seasonal allergies.  Fasting labs next week.  Recommend routine lab assessment every 3 months.  Remain under the care of cardiology.    Follow-up in 3 months, sooner if needed.           This document has been electronically signed by:  ILENAA James, NP-C

## 2017-10-03 ENCOUNTER — OFFICE VISIT (OUTPATIENT)
Dept: FAMILY MEDICINE CLINIC | Facility: CLINIC | Age: 42
End: 2017-10-03

## 2017-10-03 VITALS
DIASTOLIC BLOOD PRESSURE: 90 MMHG | HEART RATE: 68 BPM | BODY MASS INDEX: 33.31 KG/M2 | HEIGHT: 62 IN | OXYGEN SATURATION: 97 % | SYSTOLIC BLOOD PRESSURE: 158 MMHG | TEMPERATURE: 98.4 F | WEIGHT: 181 LBS

## 2017-10-03 DIAGNOSIS — H92.11 EAR DRAINAGE RIGHT: Primary | ICD-10-CM

## 2017-10-03 PROCEDURE — 99213 OFFICE O/P EST LOW 20 MIN: CPT | Performed by: NURSE PRACTITIONER

## 2017-10-03 RX ORDER — AMOXICILLIN 500 MG/1
500 CAPSULE ORAL 3 TIMES DAILY
Qty: 30 CAPSULE | Refills: 0 | Status: SHIPPED | OUTPATIENT
Start: 2017-10-03 | End: 2018-01-22

## 2017-10-03 RX ORDER — CIPROFLOXACIN AND DEXAMETHASONE 3; 1 MG/ML; MG/ML
SUSPENSION/ DROPS AURICULAR (OTIC)
Qty: 7.5 ML | Refills: 0 | Status: SHIPPED | OUTPATIENT
Start: 2017-10-03 | End: 2017-10-10

## 2017-10-03 NOTE — PROGRESS NOTES
"Subjective   Ekaterina Verdin is a 42 y.o. female.     Chief Complaint   Patient presents with   • Ear Drainage       History of Present Illness       Patient has drainage from the right ear canal.  Drainage is red and bloody.  Began late yesterday evening.  She did have cerumen removal performed yesterday without complication.  Patient is currently on a blood thinner.      The following portions of the patient's history were reviewed and updated as appropriate: allergies, current medications, past family history, past medical history, past social history, past surgical history and problem list.    Review of Systems   HENT: Positive for ear discharge, sneezing and sore throat. Negative for ear pain.        Objective     /90 (BP Location: Right arm, Patient Position: Sitting, Cuff Size: Adult)  Pulse 68  Temp 98.4 °F (36.9 °C) (Tympanic)   Ht 62\" (157.5 cm)  Wt 181 lb (82.1 kg)  SpO2 97%  BMI 33.11 kg/m2  Lab on 06/16/2017   Component Date Value Ref Range Status   • Total Cholesterol 06/16/2017 145  0 - 200 mg/dL Final   • Triglycerides 06/16/2017 218* 0 - 150 mg/dL Final   • HDL Cholesterol 06/16/2017 29* 60 - 100 mg/dL Final   • LDL Cholesterol  06/16/2017 72  0 - 100 mg/dL Final   • VLDL Cholesterol 06/16/2017 43.6  mg/dL Final   • LDL/HDL Ratio 06/16/2017 2.50   Final       Physical Exam   Constitutional: She is oriented to person, place, and time. She appears well-developed and well-nourished. No distress.   HENT:   Head: Atraumatic.   Right Ear: Hearing, tympanic membrane and external ear normal. There is drainage (No laceration is noted) and tenderness. Tympanic membrane is not injected, not perforated and not erythematous.   Left Ear: Hearing normal.   Right ear canal with erythema and dried bloody drainage located just inside  the external ear canal.  The tympanic membrane is normal.   Eyes: Pupils are equal, round, and reactive to light.   Neck: Neck supple.   Cardiovascular: Normal rate.  "   Pulmonary/Chest: Effort normal.   Neurological: She is alert and oriented to person, place, and time.   Skin: Skin is warm and dry. She is not diaphoretic.   Psychiatric: She has a normal mood and affect. Her behavior is normal. Judgment and thought content normal.   Vitals reviewed.      Assessment/Plan     Problem List Items Addressed This Visit        Nervous and Auditory    Ear drainage right - Primary        Apply pressure by placing a cottonball in the external ear canal.  Avoid scratching.  Avoid Q-tips.  Wait until bedtime then apply a small amount of Neosporin or triple antibiotic ointment to the external ear canal over the site of irritation.  Report any further bleeding.  We will start amoxicillin and Ciprodex as a prophylactic therapy.  RTC 2-5 days if not improved, sooner if condition worsens/changes. Symptomatic care advised as well as reasons for urgent or emergent care. Pt / family state understanding.                This document has been electronically signed by:  ILEANA James, NP-C

## 2017-10-12 ENCOUNTER — TELEPHONE (OUTPATIENT)
Dept: FAMILY MEDICINE CLINIC | Facility: CLINIC | Age: 42
End: 2017-10-12

## 2017-10-12 ENCOUNTER — FLU SHOT (OUTPATIENT)
Dept: FAMILY MEDICINE CLINIC | Facility: CLINIC | Age: 42
End: 2017-10-12

## 2017-10-12 ENCOUNTER — LAB (OUTPATIENT)
Dept: FAMILY MEDICINE CLINIC | Facility: CLINIC | Age: 42
End: 2017-10-12

## 2017-10-12 DIAGNOSIS — E78.2 MIXED HYPERLIPIDEMIA: ICD-10-CM

## 2017-10-12 DIAGNOSIS — I25.10 CVD (CARDIOVASCULAR DISEASE): ICD-10-CM

## 2017-10-12 LAB
25(OH)D3 SERPL-MCNC: 13 NG/ML
ALBUMIN SERPL-MCNC: 4.1 G/DL (ref 3.5–5)
ALBUMIN UR-MCNC: 3.3 MG/L
ALBUMIN/GLOB SERPL: 1.5 G/DL (ref 1.5–2.5)
ALP SERPL-CCNC: 69 U/L (ref 35–104)
ALT SERPL W P-5'-P-CCNC: 22 U/L (ref 10–36)
ANION GAP SERPL CALCULATED.3IONS-SCNC: 7 MMOL/L (ref 3.6–11.2)
AST SERPL-CCNC: 18 U/L (ref 10–30)
BASOPHILS # BLD AUTO: 0.05 10*3/MM3 (ref 0–0.3)
BASOPHILS NFR BLD AUTO: 0.6 % (ref 0–2)
BILIRUB SERPL-MCNC: 0.4 MG/DL (ref 0.2–1.8)
BUN BLD-MCNC: 9 MG/DL (ref 7–21)
BUN/CREAT SERPL: 8.7 (ref 7–25)
CALCIUM SPEC-SCNC: 9.9 MG/DL (ref 7.7–10)
CHLORIDE SERPL-SCNC: 106 MMOL/L (ref 99–112)
CHOLEST SERPL-MCNC: 168 MG/DL (ref 0–200)
CO2 SERPL-SCNC: 28 MMOL/L (ref 24.3–31.9)
CREAT BLD-MCNC: 1.04 MG/DL (ref 0.43–1.29)
DEPRECATED RDW RBC AUTO: 45.1 FL (ref 37–54)
EOSINOPHIL # BLD AUTO: 0.1 10*3/MM3 (ref 0–0.7)
EOSINOPHIL NFR BLD AUTO: 1.2 % (ref 0–5)
ERYTHROCYTE [DISTWIDTH] IN BLOOD BY AUTOMATED COUNT: 13.7 % (ref 11.5–14.5)
GFR SERPL CREATININE-BSD FRML MDRD: 58 ML/MIN/1.73
GLOBULIN UR ELPH-MCNC: 2.8 GM/DL
GLUCOSE BLD-MCNC: 108 MG/DL (ref 70–110)
HBA1C MFR BLD: 5.9 % (ref 4.5–5.7)
HCT VFR BLD AUTO: 39.6 % (ref 37–47)
HDLC SERPL-MCNC: 34 MG/DL (ref 60–100)
HGB BLD-MCNC: 13.2 G/DL (ref 12–16)
IMM GRANULOCYTES # BLD: 0.03 10*3/MM3 (ref 0–0.03)
IMM GRANULOCYTES NFR BLD: 0.4 % (ref 0–0.5)
LDLC SERPL CALC-MCNC: 58 MG/DL (ref 0–100)
LDLC/HDLC SERPL: 1.69 {RATIO}
LYMPHOCYTES # BLD AUTO: 2.48 10*3/MM3 (ref 1–3)
LYMPHOCYTES NFR BLD AUTO: 29.7 % (ref 21–51)
MCH RBC QN AUTO: 30.8 PG (ref 27–33)
MCHC RBC AUTO-ENTMCNC: 33.3 G/DL (ref 33–37)
MCV RBC AUTO: 92.3 FL (ref 80–94)
MONOCYTES # BLD AUTO: 0.61 10*3/MM3 (ref 0.1–0.9)
MONOCYTES NFR BLD AUTO: 7.3 % (ref 0–10)
NEUTROPHILS # BLD AUTO: 5.08 10*3/MM3 (ref 1.4–6.5)
NEUTROPHILS NFR BLD AUTO: 60.8 % (ref 30–70)
OSMOLALITY SERPL CALC.SUM OF ELEC: 280.5 MOSM/KG (ref 273–305)
PLATELET # BLD AUTO: 262 10*3/MM3 (ref 130–400)
PMV BLD AUTO: 11.6 FL (ref 6–10)
POTASSIUM BLD-SCNC: 4.1 MMOL/L (ref 3.5–5.3)
PROT SERPL-MCNC: 6.9 G/DL (ref 6–8)
RBC # BLD AUTO: 4.29 10*6/MM3 (ref 4.2–5.4)
SODIUM BLD-SCNC: 141 MMOL/L (ref 135–153)
TRIGL SERPL-MCNC: 382 MG/DL (ref 0–150)
TSH SERPL DL<=0.05 MIU/L-ACNC: 1.06 MIU/ML (ref 0.55–4.78)
VIT B12 BLD-MCNC: 435 PG/ML (ref 211–911)
VLDLC SERPL-MCNC: 76.4 MG/DL
WBC NRBC COR # BLD: 8.35 10*3/MM3 (ref 4.5–12.5)

## 2017-10-12 PROCEDURE — 82043 UR ALBUMIN QUANTITATIVE: CPT | Performed by: NURSE PRACTITIONER

## 2017-10-12 PROCEDURE — 82306 VITAMIN D 25 HYDROXY: CPT | Performed by: NURSE PRACTITIONER

## 2017-10-12 PROCEDURE — 80061 LIPID PANEL: CPT | Performed by: NURSE PRACTITIONER

## 2017-10-12 PROCEDURE — 84443 ASSAY THYROID STIM HORMONE: CPT | Performed by: NURSE PRACTITIONER

## 2017-10-12 PROCEDURE — 83036 HEMOGLOBIN GLYCOSYLATED A1C: CPT | Performed by: NURSE PRACTITIONER

## 2017-10-12 PROCEDURE — 80053 COMPREHEN METABOLIC PANEL: CPT | Performed by: NURSE PRACTITIONER

## 2017-10-12 PROCEDURE — 36415 COLL VENOUS BLD VENIPUNCTURE: CPT

## 2017-10-12 PROCEDURE — 90686 IIV4 VACC NO PRSV 0.5 ML IM: CPT | Performed by: NURSE PRACTITIONER

## 2017-10-12 PROCEDURE — 85025 COMPLETE CBC W/AUTO DIFF WBC: CPT | Performed by: NURSE PRACTITIONER

## 2017-10-12 PROCEDURE — 82607 VITAMIN B-12: CPT | Performed by: NURSE PRACTITIONER

## 2017-10-12 PROCEDURE — 90471 IMMUNIZATION ADMIN: CPT | Performed by: NURSE PRACTITIONER

## 2017-10-12 PROCEDURE — 96372 THER/PROPH/DIAG INJ SC/IM: CPT | Performed by: NURSE PRACTITIONER

## 2017-10-12 RX ORDER — FENOFIBRATE 48 MG/1
48 TABLET, COATED ORAL DAILY
Qty: 30 TABLET | Refills: 3 | Status: SHIPPED | OUTPATIENT
Start: 2017-10-12 | End: 2017-10-23 | Stop reason: ALTCHOICE

## 2017-10-12 RX ADMIN — CYANOCOBALAMIN 1000 MCG: 1000 INJECTION, SOLUTION INTRAMUSCULAR; SUBCUTANEOUS at 09:20

## 2017-10-12 NOTE — TELEPHONE ENCOUNTER
----- Message from ILEANA Angel sent at 10/12/2017  1:23 PM EDT -----  Please asked patient if she has been taking her cholesterol medication.  Also asked her if she was 100% fasting when these labs were drawn as her triglyceride level is greater than 300.  If she was fasting and has been taking her current cholesterol medication I want to see her sometime in the next 2-3 weeks to further discuss.  She needs to work on diet and weight loss and we will need to adjust her medications.

## 2017-10-12 NOTE — TELEPHONE ENCOUNTER
Spoke with robina and she has appointment to come back nov 8 and she is going to start on tricor she said she does take her med every night and yes she was fasting  for her labs

## 2017-10-19 ENCOUNTER — TELEPHONE (OUTPATIENT)
Dept: CARDIOLOGY | Facility: CLINIC | Age: 42
End: 2017-10-19

## 2017-10-23 ENCOUNTER — OFFICE VISIT (OUTPATIENT)
Dept: CARDIOLOGY | Facility: CLINIC | Age: 42
End: 2017-10-23

## 2017-10-23 VITALS
OXYGEN SATURATION: 97 % | WEIGHT: 181 LBS | DIASTOLIC BLOOD PRESSURE: 76 MMHG | SYSTOLIC BLOOD PRESSURE: 130 MMHG | HEIGHT: 62 IN | HEART RATE: 64 BPM | BODY MASS INDEX: 33.31 KG/M2

## 2017-10-23 DIAGNOSIS — I25.10 CORONARY ARTERY DISEASE INVOLVING NATIVE CORONARY ARTERY OF NATIVE HEART WITHOUT ANGINA PECTORIS: Primary | ICD-10-CM

## 2017-10-23 DIAGNOSIS — E66.9 OBESITY (BMI 30-39.9): ICD-10-CM

## 2017-10-23 DIAGNOSIS — E78.2 MIXED HYPERLIPIDEMIA: ICD-10-CM

## 2017-10-23 PROCEDURE — 99204 OFFICE O/P NEW MOD 45 MIN: CPT | Performed by: INTERNAL MEDICINE

## 2017-10-23 RX ORDER — ROSUVASTATIN CALCIUM 20 MG/1
20 TABLET, COATED ORAL DAILY
Qty: 30 TABLET | Refills: 11 | Status: SHIPPED | OUTPATIENT
Start: 2017-10-23 | End: 2018-06-25 | Stop reason: SDUPTHER

## 2017-10-23 NOTE — PROGRESS NOTES
Subjective   Ekaterina Verdin is a 42 y.o. female.     Chief Complaint   Patient presents with   • Follow-up       History of Present Illness     Mrs. Verdin is a pleasant 42-year-old woman who presents for follow-up of known coronary artery disease. She presented in September 2016 with an acute inferior wall myocardial infarction and was taken emergently to the cardiac catheterization laboratory and underwent drug-eluting stent implantation at that time.  She did have an echocardiogram performed during her hospitalization which was normal.  He was last seen approximately 2 months ago and at that time noted palpitations.  She underwent a Holter monitor which was unremarkable.  She currently states that she is not having any angina or anginal-like symptoms.  She denies any heart failure symptoms.  She notes that her palpitations have resolved.  She does complain of being tired and fatigued and also is concerned that her triglyceride level is relatively elevated.  She notes that she was started on a fibrate in addition to her statin and since taking both medications she has had myalgias.  She is no longer exercising on a routine basis.  She has remained tobacco free.    The following portions of the patient's history were reviewed and updated as appropriate: allergies, current medications, past family history, past medical history, past social history, past surgical history and problem list.    Review of Systems   Constitutional: Positive for fatigue. Negative for activity change and appetite change.   HENT: Negative for congestion and tinnitus.    Eyes: Negative for visual disturbance.   Respiratory: Negative for cough, chest tightness and shortness of breath.    Cardiovascular: Positive for palpitations. Negative for chest pain and leg swelling.   Gastrointestinal: Positive for nausea. Negative for blood in stool and vomiting.   Endocrine: Negative for cold intolerance, heat intolerance and polyuria.   Genitourinary:  Negative for dysuria.   Musculoskeletal: Positive for myalgias. Negative for neck pain.   Skin: Negative for rash.   Neurological: Negative for dizziness, syncope, weakness and light-headedness.   Hematological: Bruises/bleeds easily.   Psychiatric/Behavioral: Negative for confusion and sleep disturbance.       Objective   Physical Exam   Constitutional: She is oriented to person, place, and time. She appears well-developed and well-nourished. No distress.   HENT:   Head: Normocephalic and atraumatic.   Nose: Nose normal.   Mouth/Throat: Oropharynx is clear and moist.   Eyes: Conjunctivae and EOM are normal. Right eye exhibits no discharge. Left eye exhibits no discharge. No scleral icterus.   Neck: Normal range of motion. No hepatojugular reflux and no JVD present. Carotid bruit is not present. No tracheal deviation present.   Cardiovascular: Normal rate, regular rhythm, S1 normal, S2 normal and intact distal pulses.  PMI is not displaced.  Exam reveals no gallop, no S3, no S4 and no friction rub.    No murmur heard.  Pulmonary/Chest: Effort normal and breath sounds normal. No accessory muscle usage. No respiratory distress. She has no wheezes. She has no rales. She exhibits no tenderness.   Abdominal: Soft. Bowel sounds are normal. She exhibits no distension. There is no tenderness.   Musculoskeletal: Normal range of motion. She exhibits no edema or tenderness.       Vascular Status -  Her exam exhibits no right foot edema. Her exam exhibits no left foot edema.  Neurological: She is alert and oriented to person, place, and time. No cranial nerve deficit. Coordination normal.   Skin: Skin is warm and dry. She is not diaphoretic.   Nursing note and vitals reviewed.      Procedures    Assessment/Plan     Coronary Artery Disease. I suspect that overall the patient's coronary artery disease is stable.   specifically, this may has been active and despite her activity she is no longer having any angina or anginal-like  symptoms.  For now I will continue to work on risk factor modification as noted below.     Fatigue In regard to her complaint of fatigue, I suspect that this is multifactorial and due to her borderline diabetes as well as obesity and lack of exercise.  I feel that the best course of action would be to try to achieve a BMI of 25 which would require a 35 pound weight loss.  I've asked her to participate in a daily exercise program as well.  If these conservative measures fail to improve her symptoms and we will pursue further testing.      Hyperlipidemia In regard to their history of hyperlipidemia, she clearly does have a persistently elevated triglyceride level with a low HDL.  In this regard it would not be unreasonable to consider a fibrate however, fibrate's have not been shown to improve more bitty or mortality from coronary artery disease.  Additionally, she is having side effects and given that statins do improve morbidity and mortality from coronary artery disease I've asked her to go ahead and discontinue the fibrate and have changed her from Lipitor to Crestor as occasionally Crestor will improve HDL and decrease triglycerides in addition to lowering LDL.  Most importantly, I had a relatively lengthy discussion with Mrs. Verdin regarding the fact that it is possible to achieve a 50% decrease in triglycerides by weight loss alone and again as noted above and below I've asked her to go ahead and an attempt to do so.      Obesity. I spent a long time discussing the patient's weight and relevance to their cardiac risk profile. I encouraged them to work on weight loss through aerobic exercise and diet. I have discussed diet options to include weight watchers and the Mediterranean diet etc. I encouraged them to achieve a BMI of less than 25.     In short, it is been a pleasure to participate in Mrs. Verdin's care, I look forward to seeing her back in 3 months.

## 2017-11-17 ENCOUNTER — CLINICAL SUPPORT (OUTPATIENT)
Dept: FAMILY MEDICINE CLINIC | Facility: CLINIC | Age: 42
End: 2017-11-17

## 2017-11-17 DIAGNOSIS — E53.8 VITAMIN B12 DEFICIENCY: ICD-10-CM

## 2017-11-17 PROCEDURE — 96372 THER/PROPH/DIAG INJ SC/IM: CPT | Performed by: NURSE PRACTITIONER

## 2017-11-17 RX ADMIN — CYANOCOBALAMIN 1000 MCG: 1000 INJECTION, SOLUTION INTRAMUSCULAR; SUBCUTANEOUS at 18:15

## 2017-12-21 ENCOUNTER — CLINICAL SUPPORT (OUTPATIENT)
Dept: FAMILY MEDICINE CLINIC | Facility: CLINIC | Age: 42
End: 2017-12-21

## 2017-12-21 DIAGNOSIS — E53.8 B12 DEFICIENCY: ICD-10-CM

## 2017-12-21 PROCEDURE — 96372 THER/PROPH/DIAG INJ SC/IM: CPT | Performed by: NURSE PRACTITIONER

## 2017-12-21 RX ORDER — CLOPIDOGREL BISULFATE 75 MG/1
TABLET ORAL
Qty: 30 TABLET | Refills: 5 | Status: SHIPPED | OUTPATIENT
Start: 2017-12-21 | End: 2018-06-25 | Stop reason: SDUPTHER

## 2017-12-21 RX ADMIN — CYANOCOBALAMIN 1000 MCG: 1000 INJECTION, SOLUTION INTRAMUSCULAR; SUBCUTANEOUS at 13:44

## 2018-01-22 ENCOUNTER — OFFICE VISIT (OUTPATIENT)
Dept: FAMILY MEDICINE CLINIC | Facility: CLINIC | Age: 43
End: 2018-01-22

## 2018-01-22 VITALS
DIASTOLIC BLOOD PRESSURE: 90 MMHG | WEIGHT: 183 LBS | SYSTOLIC BLOOD PRESSURE: 140 MMHG | HEIGHT: 62 IN | HEART RATE: 67 BPM | OXYGEN SATURATION: 97 % | BODY MASS INDEX: 33.68 KG/M2 | TEMPERATURE: 98.4 F

## 2018-01-22 DIAGNOSIS — F06.4 ANXIETY DISORDER DUE TO GENERAL MEDICAL CONDITION WITH PANIC ATTACK: ICD-10-CM

## 2018-01-22 DIAGNOSIS — E55.9 VITAMIN D DEFICIENCY DISEASE: ICD-10-CM

## 2018-01-22 DIAGNOSIS — H61.21 EXCESSIVE CERUMEN IN RIGHT EAR CANAL: ICD-10-CM

## 2018-01-22 DIAGNOSIS — I25.10 CORONARY ARTERY DISEASE INVOLVING NATIVE CORONARY ARTERY OF NATIVE HEART WITHOUT ANGINA PECTORIS: ICD-10-CM

## 2018-01-22 DIAGNOSIS — E78.2 MIXED HYPERLIPIDEMIA: ICD-10-CM

## 2018-01-22 DIAGNOSIS — R73.9 HYPERGLYCEMIA: ICD-10-CM

## 2018-01-22 DIAGNOSIS — I25.10 CVD (CARDIOVASCULAR DISEASE): ICD-10-CM

## 2018-01-22 DIAGNOSIS — A04.8 H. PYLORI INFECTION: Primary | ICD-10-CM

## 2018-01-22 DIAGNOSIS — F41.0 ANXIETY DISORDER DUE TO GENERAL MEDICAL CONDITION WITH PANIC ATTACK: ICD-10-CM

## 2018-01-22 PROCEDURE — 96372 THER/PROPH/DIAG INJ SC/IM: CPT | Performed by: NURSE PRACTITIONER

## 2018-01-22 PROCEDURE — 99214 OFFICE O/P EST MOD 30 MIN: CPT | Performed by: NURSE PRACTITIONER

## 2018-01-22 PROCEDURE — 69210 REMOVE IMPACTED EAR WAX UNI: CPT | Performed by: NURSE PRACTITIONER

## 2018-01-22 RX ORDER — BUPROPION HYDROCHLORIDE 300 MG/1
300 TABLET ORAL EVERY MORNING
Qty: 90 TABLET | Refills: 1 | Status: SHIPPED | OUTPATIENT
Start: 2018-01-22 | End: 2018-06-25 | Stop reason: SDUPTHER

## 2018-01-22 RX ORDER — LANSOPRAZOLE, AMOXICILLIN, CLARITHROMYCIN 30-500-500
KIT ORAL 2 TIMES DAILY
Qty: 1 KIT | Refills: 0 | Status: SHIPPED | OUTPATIENT
Start: 2018-01-22 | End: 2018-02-21

## 2018-01-22 RX ADMIN — CYANOCOBALAMIN 1000 MCG: 1000 INJECTION, SOLUTION INTRAMUSCULAR; SUBCUTANEOUS at 10:40

## 2018-01-22 NOTE — PROGRESS NOTES
Subjective   Ekaterina Verdin is a 42 y.o. female.     Chief Complaint   Patient presents with   • Abdominal Pain       History of Present Illness     History of H. Pylori with recurrent symptoms. Eating makes the pain go away. Laying down after eating makes the pain worse. Tums seems to help as well as her prilosec.     CAD with history of stent x 2 - pt has not seen Dr. Aponte's replacement and has some anxiety about not having seen her new cardiologist. She denies any chest pain or shortness of breath.  Patient currently receives Crestor, metoprolol, lisinopril, aspirin, Plavix and Lasix.    Anxiety - she has taken a few of the Ativan from an old prescription. She feels nervous about her health. She feels her anxiety returned after her cardiology provider left. She feels nervous about not having met the new cardiologist in that practice.  She currently receives Wellbutrin  mg daily.  Patient states that when she first started the Wellbutrin she could see a big improvement in her anxiety.  With the Wellbutrin she did not need any Ativan for anxiety after the first few weeks following her heart attack.  She would like to discuss increasing her Wellbutrin dose.    B 12 def - monthly b 12 injection helps with her energy.     Right ear pressure and drainage.  Present ×1 week    Vitamin D deficiency-taking a supplement.    High risk medication-patient is currently on Plavix.  She denies any abnormal bleeding or bruising.      The following portions of the patient's history were reviewed and updated as appropriate: allergies, current medications, past family history, past medical history, past social history, past surgical history and problem list.    Review of Systems   Constitutional: Negative.  Negative for activity change, appetite change, chills, diaphoresis, fatigue, fever and unexpected weight change.   HENT: Positive for ear pain. Negative for congestion, sore throat and trouble swallowing.    Eyes: Negative.   "  Respiratory: Negative for cough, choking, chest tightness, shortness of breath and wheezing.    Cardiovascular: Negative.  Negative for chest pain, palpitations and leg swelling.   Gastrointestinal: Positive for abdominal pain (epigastric ) and nausea (on an empty stomach ). Negative for constipation, diarrhea and vomiting.   Endocrine: Negative.    Genitourinary: Negative for dysuria and flank pain.   Musculoskeletal: Positive for arthralgias. Negative for neck pain and neck stiffness.   Skin: Negative.    Allergic/Immunologic: Positive for environmental allergies.   Neurological: Negative.    Hematological: Negative.    Psychiatric/Behavioral: Negative for dysphoric mood, self-injury and suicidal ideas. The patient is nervous/anxious.    All other systems reviewed and are negative.      Objective     /90 (BP Location: Right arm, Patient Position: Sitting, Cuff Size: Adult)  Pulse 67  Temp 98.4 °F (36.9 °C) (Tympanic)   Ht 157.5 cm (62.01\")  Wt 83 kg (183 lb)  SpO2 97%  BMI 33.46 kg/m2  Lab on 10/12/2017   Component Date Value Ref Range Status   • Glucose 10/12/2017 108  70 - 110 mg/dL Final   • BUN 10/12/2017 9  7 - 21 mg/dL Final   • Creatinine 10/12/2017 1.04  0.43 - 1.29 mg/dL Final   • Sodium 10/12/2017 141  135 - 153 mmol/L Final   • Potassium 10/12/2017 4.1  3.5 - 5.3 mmol/L Final   • Chloride 10/12/2017 106  99 - 112 mmol/L Final   • CO2 10/12/2017 28.0  24.3 - 31.9 mmol/L Final   • Calcium 10/12/2017 9.9  7.7 - 10.0 mg/dL Final   • Total Protein 10/12/2017 6.9  6.0 - 8.0 g/dL Final   • Albumin 10/12/2017 4.10  3.50 - 5.00 g/dL Final   • ALT (SGPT) 10/12/2017 22  10 - 36 U/L Final   • AST (SGOT) 10/12/2017 18  10 - 30 U/L Final   • Alkaline Phosphatase 10/12/2017 69  35 - 104 U/L Final   • Total Bilirubin 10/12/2017 0.4  0.2 - 1.8 mg/dL Final   • eGFR Non African Amer 10/12/2017 58* >60 mL/min/1.73 Final   • Globulin 10/12/2017 2.8  gm/dL Final   • A/G Ratio 10/12/2017 1.5  1.5 - 2.5 g/dL " Final   • BUN/Creatinine Ratio 10/12/2017 8.7  7.0 - 25.0 Final   • Anion Gap 10/12/2017 7.0  3.6 - 11.2 mmol/L Final   • TSH 10/12/2017 1.063  0.550 - 4.780 mIU/mL Final   • Vitamin B-12 10/12/2017 435  211 - 911 pg/mL Final   • Microalbumin, Urine 10/12/2017 3.3  mg/L Final   • Hemoglobin A1C 10/12/2017 5.90* 4.50 - 5.70 % Final   • 25 Hydroxy, Vitamin D 10/12/2017 13.0  ng/ml Final   • Total Cholesterol 10/12/2017 168  0 - 200 mg/dL Final   • Triglycerides 10/12/2017 382* 0 - 150 mg/dL Final   • HDL Cholesterol 10/12/2017 34* 60 - 100 mg/dL Final   • LDL Cholesterol  10/12/2017 58  0 - 100 mg/dL Final   • VLDL Cholesterol 10/12/2017 76.4  mg/dL Final   • LDL/HDL Ratio 10/12/2017 1.69   Final   • WBC 10/12/2017 8.35  4.50 - 12.50 10*3/mm3 Final   • RBC 10/12/2017 4.29  4.20 - 5.40 10*6/mm3 Final   • Hemoglobin 10/12/2017 13.2  12.0 - 16.0 g/dL Final   • Hematocrit 10/12/2017 39.6  37.0 - 47.0 % Final   • MCV 10/12/2017 92.3  80.0 - 94.0 fL Final   • MCH 10/12/2017 30.8  27.0 - 33.0 pg Final   • MCHC 10/12/2017 33.3  33.0 - 37.0 g/dL Final   • RDW 10/12/2017 13.7  11.5 - 14.5 % Final   • RDW-SD 10/12/2017 45.1  37.0 - 54.0 fl Final   • MPV 10/12/2017 11.6* 6.0 - 10.0 fL Final   • Platelets 10/12/2017 262  130 - 400 10*3/mm3 Final   • Neutrophil % 10/12/2017 60.8  30.0 - 70.0 % Final   • Lymphocyte % 10/12/2017 29.7  21.0 - 51.0 % Final   • Monocyte % 10/12/2017 7.3  0.0 - 10.0 % Final   • Eosinophil % 10/12/2017 1.2  0.0 - 5.0 % Final   • Basophil % 10/12/2017 0.6  0.0 - 2.0 % Final   • Immature Grans % 10/12/2017 0.4  0.0 - 0.5 % Final   • Neutrophils, Absolute 10/12/2017 5.08  1.40 - 6.50 10*3/mm3 Final   • Lymphocytes, Absolute 10/12/2017 2.48  1.00 - 3.00 10*3/mm3 Final   • Monocytes, Absolute 10/12/2017 0.61  0.10 - 0.90 10*3/mm3 Final   • Eosinophils, Absolute 10/12/2017 0.10  0.00 - 0.70 10*3/mm3 Final   • Basophils, Absolute 10/12/2017 0.05  0.00 - 0.30 10*3/mm3 Final   • Immature Grans, Absolute 10/12/2017  0.03  0.00 - 0.03 10*3/mm3 Final   • Osmolality Calc 10/12/2017 280.5  273.0 - 305.0 mOsm/kg Final       Physical Exam   Constitutional: She is oriented to person, place, and time. She appears well-developed and well-nourished. No distress.   HENT:   Head: Normocephalic.   Right Ear: Hearing, tympanic membrane and external ear normal. There is drainage.   Left Ear: Hearing, tympanic membrane, external ear and ear canal normal.   Nose: Nose normal.   Mouth/Throat: Oropharynx is clear and moist. No oropharyngeal exudate.   Right ear canal with cerumen impaction, removed by provider with flex loop instrument.    Eyes: Pupils are equal, round, and reactive to light.   Neck: Normal range of motion. Neck supple. No tracheal deviation present. No thyromegaly present.   Cardiovascular: Normal rate, regular rhythm and normal heart sounds.  Exam reveals no gallop and no friction rub.    No murmur heard.  Pulmonary/Chest: Effort normal and breath sounds normal. No respiratory distress. She has no wheezes. She has no rales. She exhibits no tenderness.   Abdominal: Soft. Normal appearance and bowel sounds are normal. She exhibits no distension and no mass. There is no hepatosplenomegaly. There is tenderness in the epigastric area. There is no rebound and no guarding.   Musculoskeletal: Normal range of motion.   Lymphadenopathy:     She has no cervical adenopathy.   Neurological: She is alert and oriented to person, place, and time. She has normal reflexes.   CN 2-12 grossly intact    Skin: Skin is warm and dry. No rash noted. She is not diaphoretic. No erythema.   Psychiatric: She has a normal mood and affect. Her speech is normal and behavior is normal. Judgment and thought content normal. Cognition and memory are normal.   Vitals reviewed.      Assessment/Plan     Problem List Items Addressed This Visit        Cardiovascular and Mediastinum    Coronary artery disease involving native coronary artery of native heart without  angina pectoris    Relevant Orders    CBC & Differential    Comprehensive Metabolic Panel    TSH    Vitamin D 25 Hydroxy    Mixed hyperlipidemia    Relevant Orders    Lipid Panel    CVD (cardiovascular disease)    Overview     History of MI 2016            Digestive    Vitamin D deficiency disease    Relevant Orders    Vitamin D 25 Hydroxy       Nervous and Auditory    Excessive cerumen in right ear canal       Other    Anxiety disorder due to general medical condition with panic attack    Relevant Medications    buPROPion XL (WELLBUTRIN XL) 300 MG 24 hr tablet    H. pylori infection - Primary    Relevant Orders    Ambulatory Referral to Cardiology    Helicobacter Pylori, IgA IgG IgM    H. Pylori Breath Test - Breath, Lung      Other Visit Diagnoses     Hyperglycemia        Relevant Orders    Hemoglobin A1c          Current Outpatient Prescriptions:   •  aspirin  MG tablet, Take 1 tablet by mouth Daily., Disp: 30 tablet, Rfl: 5  •  buPROPion XL (WELLBUTRIN XL) 300 MG 24 hr tablet, Take 1 tablet by mouth Every Morning., Disp: 90 tablet, Rfl: 1  •  clopidogrel (PLAVIX) 75 MG tablet, TAKE ONE TABLET BY MOUTH ONCE DAILY, Disp: 30 tablet, Rfl: 5  •  furosemide (LASIX) 20 MG tablet, Take 1 tablet by mouth Daily As Needed (swelling)., Disp: 30 tablet, Rfl: 5  •  lisinopril (PRINIVIL,ZESTRIL) 2.5 MG tablet, Take 1 tablet by mouth Daily., Disp: 30 tablet, Rfl: 5  •  loratadine (CLARITIN) 10 MG tablet, Take 1 tablet by mouth Daily As Needed for Allergies., Disp: 30 tablet, Rfl: 5  •  metoprolol succinate XL (TOPROL-XL) 25 MG 24 hr tablet, Take 0.5 tablets by mouth Daily., Disp: 30 tablet, Rfl: 11  •  mupirocin (BACTROBAN) 2 % ointment, Apply  topically 3 (Three) Times a Day., Disp: 1 each, Rfl: 5  •  omeprazole (priLOSEC) 40 MG capsule, Take 1 capsule by mouth Daily., Disp: 30 capsule, Rfl: 5  •  potassium chloride (K-DUR,KLOR-CON) 10 MEQ CR tablet, Take 1 tablet by mouth Daily. Take only when take lasix, Disp: 30  tablet, Rfl: 5  •  rosuvastatin (CRESTOR) 20 MG tablet, Take 1 tablet by mouth Daily., Disp: 30 tablet, Rfl: 11  •  amoxicillin-clarithromycin-lansoprazole (PREVPAC) combo pack, Take  by mouth 2 (Two) Times a Day. Follow package directions., Disp: 1 kit, Rfl: 0    Current Facility-Administered Medications:   •  cyanocobalamin injection 1,000 mcg, 1,000 mcg, Intramuscular, Q28 Days, ILEANA Angel, 1,000 mcg at 01/22/18 1040    B 12 today per standing order.  Start Prevpac as she presents with likely reoccurance.  We have discussed possible referral for EGD and we'll discuss this further at her follow-up appointment.  Increase Wellbutrin XL from 150 mg daily up to 300 mg daily.  We will call and schedule her to see Dr. Alfonso for cardiology follow up as he has taken over Dr. Aponte's practice.   Fasting labs tomorrow including H. Pylori breath test.   Emotional support and active listening provided.   I have discussed diagnosis in detail today allowing time for questions and answers. Pt is aware of reasons to seek urgent or emergent medical care as well as reasons to return to the clinic for evaluation. Possible side effects, interactions and progression of symptoms discussed as well. Pt / family states understanding.   Recommend follow up in 6-8 weeks to review labs and reevaluation, sooner if they.           This document has been electronically signed by:  ILEANA James, NP-C

## 2018-01-23 ENCOUNTER — LAB (OUTPATIENT)
Dept: FAMILY MEDICINE CLINIC | Facility: CLINIC | Age: 43
End: 2018-01-23

## 2018-01-23 DIAGNOSIS — A04.8 H. PYLORI INFECTION: ICD-10-CM

## 2018-01-23 LAB
25(OH)D3 SERPL-MCNC: 11 NG/ML
ALBUMIN SERPL-MCNC: 4.3 G/DL (ref 3.5–5)
ALBUMIN/GLOB SERPL: 1.8 G/DL (ref 1.5–2.5)
ALP SERPL-CCNC: 59 U/L (ref 35–104)
ALT SERPL W P-5'-P-CCNC: 16 U/L (ref 10–36)
ANION GAP SERPL CALCULATED.3IONS-SCNC: 4.1 MMOL/L (ref 3.6–11.2)
AST SERPL-CCNC: 12 U/L (ref 10–30)
BASOPHILS # BLD AUTO: 0.04 10*3/MM3 (ref 0–0.3)
BASOPHILS NFR BLD AUTO: 0.4 % (ref 0–2)
BILIRUB SERPL-MCNC: 0.3 MG/DL (ref 0.2–1.8)
BUN BLD-MCNC: 13 MG/DL (ref 7–21)
BUN/CREAT SERPL: 13.3 (ref 7–25)
CALCIUM SPEC-SCNC: 9.1 MG/DL (ref 7.7–10)
CHLORIDE SERPL-SCNC: 107 MMOL/L (ref 99–112)
CHOLEST SERPL-MCNC: 120 MG/DL (ref 0–200)
CO2 SERPL-SCNC: 27.9 MMOL/L (ref 24.3–31.9)
CREAT BLD-MCNC: 0.98 MG/DL (ref 0.43–1.29)
DEPRECATED RDW RBC AUTO: 44.3 FL (ref 37–54)
EOSINOPHIL # BLD AUTO: 0.09 10*3/MM3 (ref 0–0.7)
EOSINOPHIL NFR BLD AUTO: 1 % (ref 0–5)
ERYTHROCYTE [DISTWIDTH] IN BLOOD BY AUTOMATED COUNT: 13.5 % (ref 11.5–14.5)
GFR SERPL CREATININE-BSD FRML MDRD: 62 ML/MIN/1.73
GLOBULIN UR ELPH-MCNC: 2.4 GM/DL
GLUCOSE BLD-MCNC: 108 MG/DL (ref 70–110)
HBA1C MFR BLD: 5.6 % (ref 4.5–5.7)
HCT VFR BLD AUTO: 39.2 % (ref 37–47)
HDLC SERPL-MCNC: 34 MG/DL (ref 60–100)
HGB BLD-MCNC: 12.9 G/DL (ref 12–16)
IMM GRANULOCYTES # BLD: 0.02 10*3/MM3 (ref 0–0.03)
IMM GRANULOCYTES NFR BLD: 0.2 % (ref 0–0.5)
LDLC SERPL CALC-MCNC: 44 MG/DL (ref 0–100)
LDLC/HDLC SERPL: 1.29 {RATIO}
LYMPHOCYTES # BLD AUTO: 2.14 10*3/MM3 (ref 1–3)
LYMPHOCYTES NFR BLD AUTO: 24 % (ref 21–51)
MCH RBC QN AUTO: 31.2 PG (ref 27–33)
MCHC RBC AUTO-ENTMCNC: 32.9 G/DL (ref 33–37)
MCV RBC AUTO: 94.7 FL (ref 80–94)
MONOCYTES # BLD AUTO: 0.65 10*3/MM3 (ref 0.1–0.9)
MONOCYTES NFR BLD AUTO: 7.3 % (ref 0–10)
NEUTROPHILS # BLD AUTO: 5.97 10*3/MM3 (ref 1.4–6.5)
NEUTROPHILS NFR BLD AUTO: 67.1 % (ref 30–70)
OSMOLALITY SERPL CALC.SUM OF ELEC: 278.2 MOSM/KG (ref 273–305)
PLATELET # BLD AUTO: 261 10*3/MM3 (ref 130–400)
PMV BLD AUTO: 11.3 FL (ref 6–10)
POTASSIUM BLD-SCNC: 4.4 MMOL/L (ref 3.5–5.3)
PROT SERPL-MCNC: 6.7 G/DL (ref 6–8)
RBC # BLD AUTO: 4.14 10*6/MM3 (ref 4.2–5.4)
SODIUM BLD-SCNC: 139 MMOL/L (ref 135–153)
TRIGL SERPL-MCNC: 211 MG/DL (ref 0–150)
TSH SERPL DL<=0.05 MIU/L-ACNC: 0.93 MIU/ML (ref 0.55–4.78)
VLDLC SERPL-MCNC: 42.2 MG/DL
WBC NRBC COR # BLD: 8.91 10*3/MM3 (ref 4.5–12.5)

## 2018-01-23 PROCEDURE — 86677 HELICOBACTER PYLORI ANTIBODY: CPT | Performed by: NURSE PRACTITIONER

## 2018-01-23 PROCEDURE — 80061 LIPID PANEL: CPT | Performed by: NURSE PRACTITIONER

## 2018-01-23 PROCEDURE — 82306 VITAMIN D 25 HYDROXY: CPT | Performed by: NURSE PRACTITIONER

## 2018-01-23 PROCEDURE — 80050 GENERAL HEALTH PANEL: CPT | Performed by: NURSE PRACTITIONER

## 2018-01-23 PROCEDURE — 83036 HEMOGLOBIN GLYCOSYLATED A1C: CPT | Performed by: NURSE PRACTITIONER

## 2018-01-23 PROCEDURE — 36415 COLL VENOUS BLD VENIPUNCTURE: CPT | Performed by: NURSE PRACTITIONER

## 2018-01-24 LAB
H PYLORI IGA SER IA-ACNC: <9 UNITS (ref 0–8.9)
H PYLORI IGG SER IA-ACNC: 1.69 INDEX VALUE (ref 0–0.79)
H PYLORI IGM SER-ACNC: <9 UNITS (ref 0–8.9)

## 2018-01-25 ENCOUNTER — TELEPHONE (OUTPATIENT)
Dept: FAMILY MEDICINE CLINIC | Facility: CLINIC | Age: 43
End: 2018-01-25

## 2018-02-05 ENCOUNTER — TELEPHONE (OUTPATIENT)
Dept: FAMILY MEDICINE CLINIC | Facility: CLINIC | Age: 43
End: 2018-02-05

## 2018-02-05 RX ORDER — FLUCONAZOLE 150 MG/1
150 TABLET ORAL ONCE
Qty: 1 TABLET | Refills: 0 | Status: SHIPPED | OUTPATIENT
Start: 2018-02-05 | End: 2018-02-05

## 2018-02-21 ENCOUNTER — OFFICE VISIT (OUTPATIENT)
Dept: CARDIOLOGY | Facility: CLINIC | Age: 43
End: 2018-02-21

## 2018-02-21 DIAGNOSIS — I25.10 CORONARY ARTERY DISEASE INVOLVING NATIVE CORONARY ARTERY OF NATIVE HEART WITHOUT ANGINA PECTORIS: Primary | ICD-10-CM

## 2018-02-21 DIAGNOSIS — E78.2 MIXED HYPERLIPIDEMIA: ICD-10-CM

## 2018-02-21 DIAGNOSIS — I25.10 CVD (CARDIOVASCULAR DISEASE): ICD-10-CM

## 2018-02-21 PROCEDURE — 99213 OFFICE O/P EST LOW 20 MIN: CPT | Performed by: INTERNAL MEDICINE

## 2018-02-21 RX ORDER — LISINOPRIL 5 MG/1
5 TABLET ORAL DAILY
Qty: 30 TABLET | Refills: 5 | Status: SHIPPED | OUTPATIENT
Start: 2018-02-21 | End: 2018-06-25 | Stop reason: SDUPTHER

## 2018-02-21 NOTE — PROGRESS NOTES
[facility]  [address]  [place] [address]  Phone: [phone]  Fax: [phone]    02/21/2018    Chief Complaint: Routine followup    History:   Ekaterina Verdin is a 43 y.o. female seen in followup, CAD. CAth, PCI 9/2016. No stress test since then. Some chest pain last months. Epigastrium to jaw. Pt H pylori with similar symtpoms. Easing up since treatment for H pylori. A few seconds at a time.  BP at home . 150/80. Occ HA. Compliant with medications.        Mrs. Verdin is a pleasant 42-year-old woman who presents for follow-up of known coronary artery disease. She presented in September 2016 with an acute inferior wall myocardial infarction and was taken emergently to the cardiac catheterization laboratory and underwent drug-eluting stent implantation at that time.  She did have an echocardiogram performed during her hospitalization which was normal.  He was last seen approximately 2 months ago and at that time noted palpitations.  She underwent a Holter monitor which was unremarkable.  She currently states that she is not having any angina or anginal-like symptoms.  She denies any heart failure symptoms.  She notes that her palpitations have resolved.  She does complain of being tired and fatigued and also is concerned that her triglyceride level is relatively elevated.  She notes that she was started on a fibrate in addition to her statin and since taking both medications she has had myalgias.  She is no longer exercising on a routine basis.  She has remained tobacco free.    Past Medical History:   Diagnosis Date   • Hyperlipidemia    • Mitral valve prolapse    • PCOS (polycystic ovarian syndrome)        Past Social History:  Social History     Social History   • Marital status:      Spouse name: N/A   • Number of children: N/A   • Years of education: N/A     Social History Main Topics   • Smoking status: Former Smoker     Packs/day: 1.00     Years: 23.00      Types: Cigarettes     Quit date: 9/1/2016   • Smokeless tobacco: Never Used   • Alcohol use No   • Drug use: No   • Sexual activity: Defer     Other Topics Concern   • Not on file     Social History Narrative       Past Family History:  Family History   Problem Relation Age of Onset   • COPD Mother    • Heart disease Mother    • Hyperlipidemia Mother    • Hypertension Mother    • Thyroid disease Mother    • Arthritis Father    • Cancer Father    • Heart disease Father    • Hyperlipidemia Father    • Hypertension Father    • Heart disease Maternal Aunt    • Heart disease Maternal Uncle    • Heart disease Maternal Grandmother    • Stroke Maternal Grandmother    • Heart disease Maternal Grandfather    • Diabetes Paternal Grandfather        Review of Systems:   Please see HPI  Constitution: No chills, no rigors, no unexplained weight loss or weight gain  Eyes:  No diplopia, no blurred vision, no loss of vision, conjunctiva is pink and sclera is anicteric  ENT:  No tinnitus, no otorrhea, no epistaxis, no sore throat   Respiratory: No cough, no hemoptysis  Cardiovascular: see HPI  Gastrointestinal: No nausea, no vomiting, no hematemesis, no diarrhea or constipation, no melena  Genitourinary: No frequency of dysuria no hematuria  Integument: No pruritis and  no skin rash  Hematologic / Lymphatic: No excessive bleeding, easy bruising, fatigue, lymphadenopathy and petechiae  Musculoskeletal: No joint pain, joint stiffness, joint swelling, muscle pain, muscle weakness and neck pain  Neurological: No dizziness, headaches, light headedness, seizures and vertigo  Endocrine: No frequent urination and nocturia, temperature intolerance, weight gain, unintended and weight loss, unintended      Current Outpatient Prescriptions on File Prior to Visit   Medication Sig Dispense Refill   • aspirin  MG tablet Take 1 tablet by mouth Daily. 30 tablet 5   • buPROPion XL (WELLBUTRIN XL) 300 MG 24 hr tablet Take 1 tablet by mouth Every  Morning. 90 tablet 1   • clopidogrel (PLAVIX) 75 MG tablet TAKE ONE TABLET BY MOUTH ONCE DAILY 30 tablet 5   • furosemide (LASIX) 20 MG tablet Take 1 tablet by mouth Daily As Needed (swelling). 30 tablet 5   • lisinopril (PRINIVIL,ZESTRIL) 2.5 MG tablet Take 1 tablet by mouth Daily. 30 tablet 5   • loratadine (CLARITIN) 10 MG tablet Take 1 tablet by mouth Daily As Needed for Allergies. 30 tablet 5   • metoprolol succinate XL (TOPROL-XL) 25 MG 24 hr tablet Take 0.5 tablets by mouth Daily. 30 tablet 11   • miconazole (MICOTIN) 2 % vaginal cream Insert 1 applicator into the vagina Every Night. 1 bottle 2   • omeprazole (priLOSEC) 40 MG capsule Take 1 capsule by mouth Daily. 30 capsule 5   • potassium chloride (K-DUR,KLOR-CON) 10 MEQ CR tablet Take 1 tablet by mouth Daily. Take only when take lasix 30 tablet 5   • rosuvastatin (CRESTOR) 20 MG tablet Take 1 tablet by mouth Daily. 30 tablet 11   • mupirocin (BACTROBAN) 2 % ointment Apply  topically 3 (Three) Times a Day. 1 each 5   • [DISCONTINUED] amoxicillin-clarithromycin-lansoprazole (PREVPAC) combo pack Take  by mouth 2 (Two) Times a Day. Follow package directions. 1 kit 0     Current Facility-Administered Medications on File Prior to Visit   Medication Dose Route Frequency Provider Last Rate Last Dose   • cyanocobalamin injection 1,000 mcg  1,000 mcg Intramuscular Q28 Days ILEANA Angel   1,000 mcg at 01/22/18 1040       Allergies   Allergen Reactions   • Codeine Shortness Of Breath       Objective:  There were no vitals filed for this visit.  Comfortable NAD  PERRL, conjunctiva clear  Neck supple, no JVD or thyromegaly appreciated  S1/S2 RRR, no m/r/g  Lungs CTA B, normal effort  Abdomen S/NT/ND (+) BS, no HSM appreciated  Extremities warm, no clubbing, cyanosis, or edema  Normal gait  No visible or palpable skin lesions  A/Ox4, mood and affect appropriate  Pulse exam: Lake's:    DATA:       Results for orders placed during the hospital encounter  of 01/25/17   Adult Transthoracic Echo Complete    Narrative · All left ventricular wall segments contract normally.  · Left ventricular function is normal. Estimated EF = 60%.  · There are no valve lesions noted                    Results for orders placed during the hospital encounter of 09/29/16   Cardiac catheterization    Narrative · Right dominant coronary artery system with an acute occlusion of the   right coronary artery.  · Successful emergent angioplasty and stenting of the right coronary   artery.     Final impression:  Right dominant coronary artery system with an acute occlusion of the RCA  Successful angioplasty and stenting of the right coronary artery    Procedures performed:  Emergent coronary angiography  Emergent angioplasty and stenting of the proximal right coronary artery    History of present illness:  Mrs. Verdin is a pleasant 41-year-old woman who presents without known   coronary artery disease and sudden onset of chest pain.  She was noted in   the ambulance to have ST elevation in her inferior leads and was taken   directly to the cardiac catheterization laboratory.    Procedure in detail:  The patient was brought to the cardiac catheterization laboratory and   prepped and draped in the usual sterile fashion.  Adequate anesthesia was   obtained by infiltrating the right groin with local lidocaine.  Using a   modified similar technique a 6 Ghanaian sheath was placed in the right   femoral artery.  The patient was given adequate heparinization to achieve   an ACT greater than 200 with the use of heparin.  She was given IIb IIIa   inhibition given her acute presentation and the fact that she had not been   given Plavix preprocedure.  A 6 Ghanaian JR4 guiding catheter was used to   engage the ostium of the right coronary artery.  A BMW was placed   atraumatically in the distal portion of the right coronary artery.  The   lesion was predilated with a 2.0 mm balloon.  Mechanical thrombectomy was    then performed using a Pronto catheter.  A 2.75 x 18 mm Alpine   drug-eluting stent was then implanted at 12 parisa.  The balloon was   reinflated to 12 parisa.  After this orthogonal angiography revealed a large   thrombus proximal to the previously implanted stent.  It was felt that   this was most likely the site of plaque rupture and needed to be covered   with a second stent.  First mechanical thrombectomy was repeated with a   Pronto catheter which successfully removed a very large thrombus.  After   this a 3.0 x 15 Alpine drug-eluting stent was then deployed at 12 parisa in   overlapping distal stent by 3 to 4 mm.  Both stents were then postdilated   with a 3.0 mm noncompliant balloon.  Orthogonal angiography revealed an   excellent result without evidence of dissection there was no dye hang-up   there was PATRICIA-3 flow in the distal vascular bed.  The wire was withdrawn   and orthogonal angiography was repeated which confirmed the above-noted   results.  At this point a 5 Moroccan JL4 catheter was used to engage the   ostium of the left main coronary artery and angiography was repeated in   varying views using hand injection of contrast material.  The patient was   noted to have resolution of her chest discomfort but persistent ST   elevation in the inferior leads.  As such a 5 Moroccan JR4 diagnostic   catheter was used to reengage the right coronary artery and angiography   was again performed of the right coronary artery.  This revealed the   stents to be patent, no evidence of thrombus, and no evidence of   dissection.  The patient was returned to the floor without evidence of   complication.    Findings in detail:  Left Main coronary artery:  The left main coronary artery is a large vessel which bifurcates into the   LAD and circumflex arteries.  The left main coronary artery is free of   atherosclerotic disease.    Left anterior descending artery:  The left anterior descending artery is a moderate to large vessel  which   reaches the apex of the ventricle and gives rise to one moderate diagonal   branch.  There is an eccentric 30% lesion noted just proximal to the first   diagonal branch.    Circumflex artery:  The circumflex artery is a large vessel which gives rise to 3 obtuse   marginal branches.  The first 2 obtuse marginal branches are moderate the   third of which is large.  There is a diffuse 30% lesion noted in the   distal portion of the circumflex artery just distal to the origin of the   second obtuse marginal branch.    Right coronary artery:  The right coronary artery is a large vessel which gives rise to the   posterior descending artery and several posterior lateral branches.  Prior   to intervention there is a 100% complete occlusion of the proximal right   coronary artery.  There is associated thrombus.  There is PATRICIA 0 flow and   the distal vessel.  This is the culprit lesion.  Postintervention this is   reduced to 0% residual stenosis without evidence of dissection and PATRICIA-3   flow in the distal vascular bed.  The lesion length is approximately 20   mm.  This is a type B or high risk lesion due to the presence of occlusive   thrombus.  The vessel diameter is approximately 3 mm.    Final impression and plan:  Overall it is my impression that Mrs. Verdin has undergone successful   percutaneous revascularization of her right coronary artery.  She will   undergo continued critical care monitoring over the next 24 hours and risk   factor modification as appropriate.             A/P:    CAD: with chest pain recently. Recommend stress testing.    HTN: Increase lisinorpil to 5 mg    XOL: MED diet, exercise.    Thank you for allowing me to participate in the care of Ekaterina Verdin. Feel free to contact me directly with any further questions or concerns.

## 2018-02-22 ENCOUNTER — CLINICAL SUPPORT (OUTPATIENT)
Dept: FAMILY MEDICINE CLINIC | Facility: CLINIC | Age: 43
End: 2018-02-22

## 2018-02-22 DIAGNOSIS — E53.8 VITAMIN B12 DEFICIENCY: Primary | ICD-10-CM

## 2018-02-22 PROCEDURE — 96372 THER/PROPH/DIAG INJ SC/IM: CPT | Performed by: NURSE PRACTITIONER

## 2018-02-22 RX ORDER — CYANOCOBALAMIN 1000 UG/ML
1000 INJECTION, SOLUTION INTRAMUSCULAR; SUBCUTANEOUS
Status: DISCONTINUED | OUTPATIENT
Start: 2018-02-22 | End: 2018-06-25

## 2018-02-22 RX ADMIN — CYANOCOBALAMIN 1000 MCG: 1000 INJECTION, SOLUTION INTRAMUSCULAR; SUBCUTANEOUS at 11:37

## 2018-03-19 ENCOUNTER — APPOINTMENT (OUTPATIENT)
Dept: CARDIOLOGY | Facility: HOSPITAL | Age: 43
End: 2018-03-19
Attending: INTERNAL MEDICINE

## 2018-03-19 ENCOUNTER — APPOINTMENT (OUTPATIENT)
Dept: NUCLEAR MEDICINE | Facility: HOSPITAL | Age: 43
End: 2018-03-19
Attending: INTERNAL MEDICINE

## 2018-03-29 ENCOUNTER — TELEPHONE (OUTPATIENT)
Dept: CARDIOLOGY | Facility: CLINIC | Age: 43
End: 2018-03-29

## 2018-03-29 NOTE — TELEPHONE ENCOUNTER
"While reviewing procedure logs I saw Dr. Alfonso had ordered a stress test for her recent chest pain. Originally scheduled for 3/19/18 but patient called and cancelled. States in cancel notes \" has a lot of family stuff going on right now and doesn't wish to r/s at this time.\"     She is due to come back to us around 5/31/18 and is on our Recall list. We will [place]ne[place] back to this at that time when she gets her follow up appointment scheduled.  "

## 2018-04-02 RX ORDER — CYANOCOBALAMIN 1000 UG/ML
1000 INJECTION, SOLUTION INTRAMUSCULAR; SUBCUTANEOUS
Qty: 1 ML | Refills: 11 | Status: SHIPPED | OUTPATIENT
Start: 2018-04-02 | End: 2018-06-25 | Stop reason: SDUPTHER

## 2018-06-25 ENCOUNTER — OFFICE VISIT (OUTPATIENT)
Dept: FAMILY MEDICINE CLINIC | Facility: CLINIC | Age: 43
End: 2018-06-25

## 2018-06-25 VITALS
DIASTOLIC BLOOD PRESSURE: 70 MMHG | OXYGEN SATURATION: 99 % | SYSTOLIC BLOOD PRESSURE: 112 MMHG | HEIGHT: 62 IN | WEIGHT: 166 LBS | BODY MASS INDEX: 30.55 KG/M2 | TEMPERATURE: 99.9 F | HEART RATE: 97 BPM

## 2018-06-25 DIAGNOSIS — E55.9 VITAMIN D DEFICIENCY DISEASE: ICD-10-CM

## 2018-06-25 DIAGNOSIS — F06.4 ANXIETY DISORDER DUE TO GENERAL MEDICAL CONDITION WITH PANIC ATTACK: ICD-10-CM

## 2018-06-25 DIAGNOSIS — R31.9 URINARY TRACT INFECTION WITH HEMATURIA, SITE UNSPECIFIED: ICD-10-CM

## 2018-06-25 DIAGNOSIS — F41.0 ANXIETY DISORDER DUE TO GENERAL MEDICAL CONDITION WITH PANIC ATTACK: ICD-10-CM

## 2018-06-25 DIAGNOSIS — E78.2 MIXED HYPERLIPIDEMIA: ICD-10-CM

## 2018-06-25 DIAGNOSIS — N39.0 URINARY TRACT INFECTION WITH HEMATURIA, SITE UNSPECIFIED: ICD-10-CM

## 2018-06-25 DIAGNOSIS — I25.10 CORONARY ARTERY DISEASE INVOLVING NATIVE CORONARY ARTERY OF NATIVE HEART WITHOUT ANGINA PECTORIS: ICD-10-CM

## 2018-06-25 DIAGNOSIS — A04.8 H. PYLORI INFECTION: ICD-10-CM

## 2018-06-25 DIAGNOSIS — N91.2 AMENORRHEA: Primary | ICD-10-CM

## 2018-06-25 DIAGNOSIS — E66.9 OBESITY (BMI 30-39.9): ICD-10-CM

## 2018-06-25 LAB
B-HCG UR QL: NEGATIVE
BILIRUB BLD-MCNC: NEGATIVE MG/DL
CLARITY, POC: CLEAR
COLOR UR: YELLOW
GLUCOSE UR STRIP-MCNC: NEGATIVE MG/DL
INTERNAL NEGATIVE CONTROL: NEGATIVE
INTERNAL POSITIVE CONTROL: POSITIVE
KETONES UR QL: NEGATIVE
LEUKOCYTE EST, POC: ABNORMAL
Lab: NORMAL
NITRITE UR-MCNC: NEGATIVE MG/ML
PH UR: 6 [PH] (ref 5–8)
PROT UR STRIP-MCNC: NEGATIVE MG/DL
RBC # UR STRIP: NEGATIVE /UL
SP GR UR: 1.02 (ref 1–1.03)
UROBILINOGEN UR QL: NORMAL

## 2018-06-25 PROCEDURE — 99214 OFFICE O/P EST MOD 30 MIN: CPT | Performed by: NURSE PRACTITIONER

## 2018-06-25 PROCEDURE — 81025 URINE PREGNANCY TEST: CPT | Performed by: NURSE PRACTITIONER

## 2018-06-25 RX ORDER — LORATADINE 10 MG/1
10 TABLET ORAL DAILY PRN
Qty: 30 TABLET | Refills: 5 | Status: SHIPPED | OUTPATIENT
Start: 2018-06-25 | End: 2019-02-20 | Stop reason: SDUPTHER

## 2018-06-25 RX ORDER — ASPIRIN 325 MG
325 TABLET, DELAYED RELEASE (ENTERIC COATED) ORAL DAILY
Qty: 30 TABLET | Refills: 5 | Status: SHIPPED | OUTPATIENT
Start: 2018-06-25 | End: 2019-02-20 | Stop reason: SDUPTHER

## 2018-06-25 RX ORDER — ROSUVASTATIN CALCIUM 20 MG/1
20 TABLET, COATED ORAL DAILY
Qty: 30 TABLET | Refills: 11 | Status: SHIPPED | OUTPATIENT
Start: 2018-06-25 | End: 2019-02-20 | Stop reason: SDUPTHER

## 2018-06-25 RX ORDER — POTASSIUM CHLORIDE 750 MG/1
10 TABLET, EXTENDED RELEASE ORAL DAILY
Qty: 30 TABLET | Refills: 5 | Status: SHIPPED | OUTPATIENT
Start: 2018-06-25 | End: 2019-02-20 | Stop reason: SDUPTHER

## 2018-06-25 RX ORDER — OMEPRAZOLE 40 MG/1
40 CAPSULE, DELAYED RELEASE ORAL DAILY
Qty: 30 CAPSULE | Refills: 5 | Status: SHIPPED | OUTPATIENT
Start: 2018-06-25 | End: 2019-02-20 | Stop reason: SDUPTHER

## 2018-06-25 RX ORDER — BUPROPION HYDROCHLORIDE 300 MG/1
300 TABLET ORAL EVERY MORNING
Qty: 90 TABLET | Refills: 1 | Status: SHIPPED | OUTPATIENT
Start: 2018-06-25 | End: 2019-02-20 | Stop reason: SDUPTHER

## 2018-06-25 RX ORDER — FUROSEMIDE 20 MG/1
20 TABLET ORAL DAILY PRN
Qty: 30 TABLET | Refills: 5 | Status: SHIPPED | OUTPATIENT
Start: 2018-06-25 | End: 2019-02-20 | Stop reason: SDUPTHER

## 2018-06-25 RX ORDER — CYANOCOBALAMIN 1000 UG/ML
1000 INJECTION, SOLUTION INTRAMUSCULAR; SUBCUTANEOUS
Qty: 1 ML | Refills: 11 | Status: SHIPPED | OUTPATIENT
Start: 2018-06-25 | End: 2020-06-03 | Stop reason: SDUPTHER

## 2018-06-25 RX ORDER — METOPROLOL SUCCINATE 25 MG/1
12.5 TABLET, EXTENDED RELEASE ORAL DAILY
Qty: 30 TABLET | Refills: 11 | Status: SHIPPED | OUTPATIENT
Start: 2018-06-25 | End: 2019-02-20 | Stop reason: SDUPTHER

## 2018-06-25 RX ORDER — CEPHALEXIN 500 MG/1
500 CAPSULE ORAL 3 TIMES DAILY
Qty: 30 CAPSULE | Refills: 0 | Status: SHIPPED | OUTPATIENT
Start: 2018-06-25 | End: 2018-08-30

## 2018-06-25 RX ORDER — FLUCONAZOLE 150 MG/1
150 TABLET ORAL ONCE
Qty: 1 TABLET | Refills: 3 | Status: SHIPPED | OUTPATIENT
Start: 2018-06-25 | End: 2018-10-04 | Stop reason: SDUPTHER

## 2018-06-25 RX ORDER — LISINOPRIL 5 MG/1
5 TABLET ORAL DAILY
Qty: 30 TABLET | Refills: 5 | Status: SHIPPED | OUTPATIENT
Start: 2018-06-25 | End: 2019-02-20 | Stop reason: SDUPTHER

## 2018-06-25 RX ORDER — CLOPIDOGREL BISULFATE 75 MG/1
75 TABLET ORAL DAILY
Qty: 30 TABLET | Refills: 5 | Status: SHIPPED | OUTPATIENT
Start: 2018-06-25 | End: 2019-02-20 | Stop reason: SDUPTHER

## 2018-06-25 NOTE — PROGRESS NOTES
Subjective   Ekaterina Verdin is a 43 y.o. female.     Chief Complaint   Patient presents with   • Follow-up   • Hyperlipidemia   • Coronary Artery Disease       History of Present Illness        Obesity - trying to be more active and make heart healthy food choices. She is watching.     Amenorrhea-patient lost her menstrual cycle for approximately one year after her heart attack.  It returned last year until 2 months ago when he suddenly stopped.  She reports that she has had some nausea, loss of appetite and fatigue.  Increase in libido and mood swings.  She reports having the symptoms during her pregnancies.  History is significant for ablation and tubal ligation.     Coronary  artery disease involving native coronary artery of native heart without angina pectoris-under the care of cardiology [facility].  Compliant with her medication.  Following a heart healthy diet.    Recent weight loss of 17 pounds is noted.  Making lifestyle changes.    Mixed hyperlipidemia-lifestyle changes, diet and Crestor.  Stable.    Obesity-working on lifestyle and diet changes.    Anxiety disorder due to medical condition with history of panic attacks-stable with Wellbutrin.      GERD with H. pylori infection-stable with Prilosec    Vit d def - stable with supplement    Vit b-12 def - stable with monthly injections        The following portions of the patient's history were reviewed and updated as appropriate: allergies, current medications, past family history, past medical history, past social history, past surgical history and problem list.    Review of Systems   Constitutional: Positive for fatigue. Negative for appetite change, diaphoresis, fever (low grade temp today ) and unexpected weight change.   HENT: Negative for congestion, ear pain, nosebleeds, postnasal drip, rhinorrhea, sore throat, trouble swallowing and voice change.    Eyes: Negative for pain and visual disturbance.   Respiratory: Negative for cough, chest  "tightness, shortness of breath and wheezing.    Cardiovascular: Negative for chest pain, palpitations and leg swelling.   Gastrointestinal: Positive for nausea. Negative for abdominal pain, blood in stool, constipation and diarrhea.   Endocrine: Negative for cold intolerance and polydipsia.   Genitourinary: Positive for menstrual problem. Negative for difficulty urinating, flank pain and hematuria.   Musculoskeletal: Negative for arthralgias, back pain, gait problem, joint swelling and myalgias.   Skin: Negative for color change and rash.   Allergic/Immunologic: Negative.    Neurological: Positive for weakness. Negative for syncope, numbness and headaches.   Hematological: Negative.    Psychiatric/Behavioral: Negative for dysphoric mood, self-injury, sleep disturbance and suicidal ideas.   All other systems reviewed and are negative.      Objective     /70   Pulse 97   Temp 99.9 °F (37.7 °C) (Temporal Artery )   Ht 157.5 cm (62.01\")   Wt 75.3 kg (166 lb)   SpO2 99%   BMI 30.35 kg/m²   Lab on 01/23/2018   Component Date Value Ref Range Status   • H. pylori IgG 01/23/2018 1.69* 0.00 - 0.79 Index Value Final   • H. pylori, IgA ABS 01/23/2018 <9.0  0.0 - 8.9 units Final   • H. Pylori, IgM 01/23/2018 <9.0  0.0 - 8.9 units Final       Physical Exam   Constitutional: She is oriented to person, place, and time. She appears well-developed and well-nourished. No distress.   HENT:   Head: Normocephalic and atraumatic.   Right Ear: External ear normal.   Left Ear: External ear normal.   Nose: Nose normal.   Mouth/Throat: Oropharynx is clear and moist. No oropharyngeal exudate.   Eyes: Conjunctivae and EOM are normal. Pupils are equal, round, and reactive to light. Right eye exhibits no discharge. Left eye exhibits no discharge.   Neck: Normal range of motion. Neck supple. No tracheal deviation present. No thyromegaly present.   Cardiovascular: Normal rate, regular rhythm, normal heart sounds and intact distal " pulses.  Exam reveals no gallop and no friction rub.    No murmur heard.  Pulmonary/Chest: Effort normal and breath sounds normal. No respiratory distress. She has no wheezes. She has no rales. She exhibits no tenderness.   Abdominal: Soft. Bowel sounds are normal. She exhibits no distension and no mass. There is no tenderness. There is no rebound and no guarding.   Musculoskeletal: Normal range of motion.   Lymphadenopathy:     She has no cervical adenopathy.   Neurological: She is alert and oriented to person, place, and time. She has normal reflexes.   CN 2-12 grossly intact    Skin: Skin is warm and dry. Capillary refill takes less than 2 seconds. No rash noted. She is not diaphoretic. No erythema.   Psychiatric: She has a normal mood and affect. Her behavior is normal. Judgment and thought content normal.   Vitals reviewed.      Assessment/Plan     Problem List Items Addressed This Visit        Cardiovascular and Mediastinum    Coronary artery disease involving native coronary artery of native heart without angina pectoris    Relevant Medications    clopidogrel (PLAVIX) 75 MG tablet    metoprolol succinate XL (TOPROL-XL) 25 MG 24 hr tablet    Other Relevant Orders    CBC & Differential    Comprehensive Metabolic Panel    TSH    Hemoglobin A1c    Vitamin D 25 Hydroxy    Uric Acid    Lipid Panel    Vitamin B12    hCG, Quantitative, Pregnancy    Helicobacter Pylori, IgA IgG IgM    Mixed hyperlipidemia    Relevant Medications    rosuvastatin (CRESTOR) 20 MG tablet    Other Relevant Orders    CBC & Differential    Comprehensive Metabolic Panel    TSH    Hemoglobin A1c    Vitamin D 25 Hydroxy    Uric Acid    Lipid Panel    Vitamin B12    hCG, Quantitative, Pregnancy    Helicobacter Pylori, IgA IgG IgM       Digestive    Obesity (BMI 30-39.9)    Vitamin D deficiency disease    Relevant Orders    Vitamin D 25 Hydroxy       Genitourinary    Amenorrhea - Primary    Relevant Orders    POCT pregnancy, urine (Completed)     hCG, Quantitative, Pregnancy    POCT urinalysis dipstick, automated    Estrogens, Total    Progesterone       Other    Anxiety disorder due to general medical condition with panic attack    Relevant Medications    buPROPion XL (WELLBUTRIN XL) 300 MG 24 hr tablet    Other Relevant Orders    CBC & Differential    Comprehensive Metabolic Panel    TSH    Hemoglobin A1c    Vitamin D 25 Hydroxy    Uric Acid    Lipid Panel    Vitamin B12    hCG, Quantitative, Pregnancy    Helicobacter Pylori, IgA IgG IgM    H. pylori infection    Relevant Orders    Helicobacter Pylori, IgA IgG IgM      Other Visit Diagnoses     Urinary tract infection with hematuria, site unspecified        Relevant Medications    cephalexin (KEFLEX) 500 MG capsule    Other Relevant Orders    Urinalysis With / Culture If Indicated - Urine, Clean Catch        UA with +1 leukocytes.  Will start on antibiotics and daily Diflucan for preventative measure.  Will request urine culture.  Fasting labs in the morning including hormone panel.     urine pregnancy test negative, will do serial hCG tomorrow.  I have discussed diagnosis in detail today allowing time for questions and answers. Pt is aware of reasons to seek urgent or emergent medical care as well as reasons to return to the clinic for evaluation. Possible side effects, interactions and progression of symptoms discussed as well. Pt / family states understanding.   Emotional support and active listening provided.   Patient's Body mass index is 30.35 kg/m². BMI is above normal parameters. Recommendations include: nutrition counseling.  Follow up in one month, sooner if needed. Routine labs every 3-6 months.     Errors in dictation may reflect use of voice recognition software and not all errors in transcription may have been detected prior to signing.       Schedule annual wellness exam/physical          This document has been electronically signed by:  ILEANA James, NP-C

## 2018-06-26 ENCOUNTER — LAB (OUTPATIENT)
Dept: FAMILY MEDICINE CLINIC | Facility: CLINIC | Age: 43
End: 2018-06-26

## 2018-06-26 DIAGNOSIS — I25.10 CORONARY ARTERY DISEASE INVOLVING NATIVE CORONARY ARTERY OF NATIVE HEART WITHOUT ANGINA PECTORIS: ICD-10-CM

## 2018-06-26 DIAGNOSIS — F41.0 ANXIETY DISORDER DUE TO GENERAL MEDICAL CONDITION WITH PANIC ATTACK: ICD-10-CM

## 2018-06-26 DIAGNOSIS — E78.2 MIXED HYPERLIPIDEMIA: ICD-10-CM

## 2018-06-26 DIAGNOSIS — F06.4 ANXIETY DISORDER DUE TO GENERAL MEDICAL CONDITION WITH PANIC ATTACK: ICD-10-CM

## 2018-06-26 DIAGNOSIS — A04.8 H. PYLORI INFECTION: ICD-10-CM

## 2018-06-26 DIAGNOSIS — N91.2 AMENORRHEA: ICD-10-CM

## 2018-06-26 DIAGNOSIS — E55.9 VITAMIN D DEFICIENCY DISEASE: ICD-10-CM

## 2018-06-26 LAB
25(OH)D3 SERPL-MCNC: 15 NG/ML
ALBUMIN SERPL-MCNC: 4.5 G/DL (ref 3.5–5)
ALBUMIN/GLOB SERPL: 1.7 G/DL (ref 1.5–2.5)
ALP SERPL-CCNC: 57 U/L (ref 35–104)
ALT SERPL W P-5'-P-CCNC: 21 U/L (ref 10–36)
ANION GAP SERPL CALCULATED.3IONS-SCNC: 6.1 MMOL/L (ref 3.6–11.2)
AST SERPL-CCNC: 15 U/L (ref 10–30)
BACTERIA UR QL AUTO: ABNORMAL /HPF
BASOPHILS # BLD AUTO: 0.03 10*3/MM3 (ref 0–0.3)
BASOPHILS NFR BLD AUTO: 0.5 % (ref 0–2)
BILIRUB SERPL-MCNC: 0.4 MG/DL (ref 0.2–1.8)
BILIRUB UR QL STRIP: NEGATIVE
BUN BLD-MCNC: 10 MG/DL (ref 7–21)
BUN/CREAT SERPL: 8.9 (ref 7–25)
CALCIUM SPEC-SCNC: 9.5 MG/DL (ref 7.7–10)
CHLORIDE SERPL-SCNC: 106 MMOL/L (ref 99–112)
CHOLEST SERPL-MCNC: 114 MG/DL (ref 0–200)
CLARITY UR: ABNORMAL
CO2 SERPL-SCNC: 28.9 MMOL/L (ref 24.3–31.9)
COLOR UR: YELLOW
CREAT BLD-MCNC: 1.12 MG/DL (ref 0.43–1.29)
DEPRECATED RDW RBC AUTO: 41.9 FL (ref 37–54)
EOSINOPHIL # BLD AUTO: 0.05 10*3/MM3 (ref 0–0.7)
EOSINOPHIL NFR BLD AUTO: 0.8 % (ref 0–5)
ERYTHROCYTE [DISTWIDTH] IN BLOOD BY AUTOMATED COUNT: 12.8 % (ref 11.5–14.5)
GFR SERPL CREATININE-BSD FRML MDRD: 53 ML/MIN/1.73
GLOBULIN UR ELPH-MCNC: 2.6 GM/DL
GLUCOSE BLD-MCNC: 88 MG/DL (ref 70–110)
GLUCOSE UR STRIP-MCNC: NEGATIVE MG/DL
HBA1C MFR BLD: 5.5 % (ref 4.5–5.7)
HCG INTACT+B SERPL-ACNC: <2 MIU/ML (ref 0–5)
HCT VFR BLD AUTO: 39.5 % (ref 37–47)
HDLC SERPL-MCNC: 36 MG/DL (ref 60–100)
HGB BLD-MCNC: 13.3 G/DL (ref 12–16)
HGB UR QL STRIP.AUTO: ABNORMAL
HYALINE CASTS UR QL AUTO: ABNORMAL /LPF
IMM GRANULOCYTES # BLD: 0.02 10*3/MM3 (ref 0–0.03)
IMM GRANULOCYTES NFR BLD: 0.3 % (ref 0–0.5)
KETONES UR QL STRIP: NEGATIVE
LDLC SERPL CALC-MCNC: 48 MG/DL (ref 0–100)
LDLC/HDLC SERPL: 1.33 {RATIO}
LEUKOCYTE ESTERASE UR QL STRIP.AUTO: ABNORMAL
LYMPHOCYTES # BLD AUTO: 2.04 10*3/MM3 (ref 1–3)
LYMPHOCYTES NFR BLD AUTO: 32.2 % (ref 21–51)
MCH RBC QN AUTO: 30.8 PG (ref 27–33)
MCHC RBC AUTO-ENTMCNC: 33.7 G/DL (ref 33–37)
MCV RBC AUTO: 91.4 FL (ref 80–94)
MONOCYTES # BLD AUTO: 0.61 10*3/MM3 (ref 0.1–0.9)
MONOCYTES NFR BLD AUTO: 9.6 % (ref 0–10)
NEUTROPHILS # BLD AUTO: 3.59 10*3/MM3 (ref 1.4–6.5)
NEUTROPHILS NFR BLD AUTO: 56.6 % (ref 30–70)
NITRITE UR QL STRIP: NEGATIVE
OSMOLALITY SERPL CALC.SUM OF ELEC: 279.7 MOSM/KG (ref 273–305)
PH UR STRIP.AUTO: <=5 [PH] (ref 5–8)
PLATELET # BLD AUTO: 226 10*3/MM3 (ref 130–400)
PMV BLD AUTO: 11.8 FL (ref 6–10)
POTASSIUM BLD-SCNC: 3.8 MMOL/L (ref 3.5–5.3)
PROGEST SERPL-MCNC: 1.13 NG/ML
PROT SERPL-MCNC: 7.1 G/DL (ref 6–8)
PROT UR QL STRIP: NEGATIVE
RBC # BLD AUTO: 4.32 10*6/MM3 (ref 4.2–5.4)
RBC # UR: ABNORMAL /HPF
REF LAB TEST METHOD: ABNORMAL
SODIUM BLD-SCNC: 141 MMOL/L (ref 135–153)
SP GR UR STRIP: 1.02 (ref 1–1.03)
SQUAMOUS #/AREA URNS HPF: ABNORMAL /HPF
TRIGL SERPL-MCNC: 151 MG/DL (ref 0–150)
TSH SERPL DL<=0.05 MIU/L-ACNC: 1.08 MIU/ML (ref 0.55–4.78)
URATE SERPL-MCNC: 6 MG/DL (ref 2.4–5.7)
UROBILINOGEN UR QL STRIP: ABNORMAL
VIT B12 BLD-MCNC: 408 PG/ML (ref 211–911)
VLDLC SERPL-MCNC: 30.2 MG/DL
WBC NRBC COR # BLD: 6.34 10*3/MM3 (ref 4.5–12.5)
WBC UR QL AUTO: ABNORMAL /HPF

## 2018-06-26 PROCEDURE — 87077 CULTURE AEROBIC IDENTIFY: CPT | Performed by: NURSE PRACTITIONER

## 2018-06-26 PROCEDURE — 84550 ASSAY OF BLOOD/URIC ACID: CPT | Performed by: NURSE PRACTITIONER

## 2018-06-26 PROCEDURE — 36415 COLL VENOUS BLD VENIPUNCTURE: CPT | Performed by: NURSE PRACTITIONER

## 2018-06-26 PROCEDURE — 80050 GENERAL HEALTH PANEL: CPT | Performed by: NURSE PRACTITIONER

## 2018-06-26 PROCEDURE — 82607 VITAMIN B-12: CPT | Performed by: NURSE PRACTITIONER

## 2018-06-26 PROCEDURE — 81001 URINALYSIS AUTO W/SCOPE: CPT | Performed by: NURSE PRACTITIONER

## 2018-06-26 PROCEDURE — 82672 ASSAY OF ESTROGEN: CPT | Performed by: NURSE PRACTITIONER

## 2018-06-26 PROCEDURE — 84144 ASSAY OF PROGESTERONE: CPT | Performed by: NURSE PRACTITIONER

## 2018-06-26 PROCEDURE — 80061 LIPID PANEL: CPT | Performed by: NURSE PRACTITIONER

## 2018-06-26 PROCEDURE — 86677 HELICOBACTER PYLORI ANTIBODY: CPT | Performed by: NURSE PRACTITIONER

## 2018-06-26 PROCEDURE — 82306 VITAMIN D 25 HYDROXY: CPT | Performed by: NURSE PRACTITIONER

## 2018-06-26 PROCEDURE — 87186 SC STD MICRODIL/AGAR DIL: CPT | Performed by: NURSE PRACTITIONER

## 2018-06-26 PROCEDURE — 84702 CHORIONIC GONADOTROPIN TEST: CPT | Performed by: NURSE PRACTITIONER

## 2018-06-26 PROCEDURE — 87086 URINE CULTURE/COLONY COUNT: CPT | Performed by: NURSE PRACTITIONER

## 2018-06-26 PROCEDURE — 83036 HEMOGLOBIN GLYCOSYLATED A1C: CPT | Performed by: NURSE PRACTITIONER

## 2018-06-27 LAB
H PYLORI IGA SER IA-ACNC: <9 UNITS (ref 0–8.9)
H PYLORI IGG SER IA-ACNC: 1.9 INDEX VALUE (ref 0–0.79)
H PYLORI IGM SER-ACNC: <9 UNITS (ref 0–8.9)

## 2018-06-28 LAB — BACTERIA SPEC AEROBE CULT: ABNORMAL

## 2018-06-29 ENCOUNTER — OFFICE VISIT (OUTPATIENT)
Dept: CARDIOLOGY | Facility: CLINIC | Age: 43
End: 2018-06-29

## 2018-06-29 VITALS
SYSTOLIC BLOOD PRESSURE: 142 MMHG | WEIGHT: 166 LBS | HEIGHT: 62 IN | BODY MASS INDEX: 30.55 KG/M2 | HEART RATE: 59 BPM | OXYGEN SATURATION: 100 % | DIASTOLIC BLOOD PRESSURE: 80 MMHG

## 2018-06-29 DIAGNOSIS — I25.10 CORONARY ARTERY DISEASE INVOLVING NATIVE CORONARY ARTERY OF NATIVE HEART WITHOUT ANGINA PECTORIS: Primary | ICD-10-CM

## 2018-06-29 DIAGNOSIS — E78.2 MIXED HYPERLIPIDEMIA: ICD-10-CM

## 2018-06-29 LAB — ESTROGEN SERPL-MCNC: 161 PG/ML

## 2018-06-29 PROCEDURE — 99213 OFFICE O/P EST LOW 20 MIN: CPT | Performed by: INTERNAL MEDICINE

## 2018-06-29 NOTE — PROGRESS NOTES
[facility] CARDIOLOGY  [address]  [place] [address]  Phone: [phone]  Fax: [phone]    06/29/2018    Chief Complaint: Routine followup of , CAD    History:   Ekaterina Verdin is a 43 y.o. female seen in followup, No further CP. Pt thought due to emotional stress so did not go to perform stress test. Not a smoker. BP usually low normal. 112/70.        Recent visit hx:    History:   Ekaterina Verdin is a 43 y.o. female seen in followup, CAD. CAth, PCI 9/2016. No stress test since then. Some chest pain last months. Epigastrium to jaw. Pt H pylori with similar symtpoms. Easing up since treatment for H pylori. A few seconds at a time.  BP at home . 150/80. Occ HA. Compliant with medications.           Mrs. Verdin is a pleasant 42-year-old woman who presents for follow-up of known coronary artery disease. She presented in September 2016 with an acute inferior wall myocardial infarction and was taken emergently to the cardiac catheterization laboratory and underwent drug-eluting stent implantation at that time.  She did have an echocardiogram performed during her hospitalization which was normal.  He was last seen approximately 2 months ago and at that time noted palpitations.  She underwent a Holter monitor which was unremarkable.  She currently states that she is not having any angina or anginal-like symptoms.  She denies any heart failure symptoms.  She notes that her palpitations have resolved.  She does complain of being tired and fatigued and also is concerned that her triglyceride level is relatively elevated.  She notes that she was started on a fibrate in addition to her statin and since taking both medications she has had myalgias.  She is no longer exercising on a routine basis.  She has remained tobacco free.    Past Medical History:   Diagnosis Date   • Hyperlipidemia    • Mitral valve prolapse    • PCOS (polycystic ovarian syndrome)        Past Social History:  Social History      Social History   • Marital status:      Social History Main Topics   • Smoking status: Former Smoker     Packs/day: 1.00     Years: 23.00     Types: Cigarettes     Quit date: 9/1/2016   • Smokeless tobacco: Never Used   • Alcohol use No   • Drug use: No   • Sexual activity: Defer     Other Topics Concern   • Not on file       Past Family History:  Family History   Problem Relation Age of Onset   • COPD Mother    • Heart disease Mother    • Hyperlipidemia Mother    • Hypertension Mother    • Thyroid disease Mother    • Arthritis Father    • Cancer Father    • Heart disease Father    • Hyperlipidemia Father    • Hypertension Father    • Heart disease Maternal Aunt    • Heart disease Maternal Uncle    • Heart disease Maternal Grandmother    • Stroke Maternal Grandmother    • Heart disease Maternal Grandfather    • Diabetes Paternal Grandfather        Review of Systems:   Please see HPI  Constitution: No chills, no rigors, no unexplained weight loss or weight gain  Eyes:  No diplopia, no blurred vision, no loss of vision, conjunctiva is pink and sclera is anicteric  ENT:  No tinnitus, no otorrhea, no epistaxis, no sore throat   Respiratory: No cough, no hemoptysis  Cardiovascular: see HPI  Gastrointestinal: No nausea, no vomiting, no hematemesis, no diarrhea or constipation, no melena  Genitourinary: No frequency of dysuria no hematuria  Integument: No pruritis and  no skin rash  Hematologic / Lymphatic: No excessive bleeding, easy bruising, fatigue, lymphadenopathy and petechiae  Musculoskeletal: No joint pain, joint stiffness, joint swelling, muscle pain, muscle weakness and neck pain  Neurological: No dizziness, headaches, light headedness, seizures and vertigo  Endocrine: No frequent urination and nocturia, temperature intolerance, weight gain, unintended and weight loss, unintended      Current Outpatient Prescriptions on File Prior to Visit   Medication Sig Dispense Refill   • aspirin  MG tablet  Take 1 tablet by mouth Daily. 30 tablet 5   • buPROPion XL (WELLBUTRIN XL) 300 MG 24 hr tablet Take 1 tablet by mouth Every Morning. 90 tablet 1   • clopidogrel (PLAVIX) 75 MG tablet Take 1 tablet by mouth Daily. 30 tablet 5   • cyanocobalamin 1000 MCG/ML injection Inject 1 mL into the shoulder, thigh, or buttocks Every 28 (Twenty-Eight) Days. Given at home 1 mL 11   • furosemide (LASIX) 20 MG tablet Take 1 tablet by mouth Daily As Needed (swelling). 30 tablet 5   • lisinopril (PRINIVIL,ZESTRIL) 5 MG tablet Take 1 tablet by mouth Daily. 30 tablet 5   • loratadine (CLARITIN) 10 MG tablet Take 1 tablet by mouth Daily As Needed for Allergies. 30 tablet 5   • metoprolol succinate XL (TOPROL-XL) 25 MG 24 hr tablet Take 0.5 tablets by mouth Daily. 30 tablet 11   • omeprazole (priLOSEC) 40 MG capsule Take 1 capsule by mouth Daily. 30 capsule 5   • potassium chloride (K-DUR,KLOR-CON) 10 MEQ CR tablet Take 1 tablet by mouth Daily. Take only when take lasix 30 tablet 5   • rosuvastatin (CRESTOR) 20 MG tablet Take 1 tablet by mouth Daily. 30 tablet 11   • cephalexin (KEFLEX) 500 MG capsule Take 1 capsule by mouth 3 (Three) Times a Day. 30 capsule 0     No current facility-administered medications on file prior to visit.        Allergies   Allergen Reactions   • Codeine Shortness Of Breath       Objective:  Vitals:    06/29/18 1134   BP: 142/80   Pulse: 59   SpO2: 100%     Comfortable NAD  PERRL, conjunctiva clear  Neck supple, no JVD or thyromegaly appreciated  S1/S2 RRR, no m/r/g  Lungs CTA B, normal effort  Abdomen S/NT/ND (+) BS, no HSM appreciated  Extremities warm, no clubbing, cyanosis, or edema  Normal gait  No visible or palpable skin lesions  A/Ox4, mood and affect appropriate  Pulse exam: Lake's:    DATA:       Results for orders placed during the hospital encounter of 01/25/17   Adult Transthoracic Echo Complete    Narrative · All left ventricular wall segments contract normally.  · Left ventricular function is  normal. Estimated EF = 60%.  · There are no valve lesions noted                    Results for orders placed during the hospital encounter of 09/29/16   Cardiac catheterization    Narrative · Right dominant coronary artery system with an acute occlusion of the   right coronary artery.  · Successful emergent angioplasty and stenting of the right coronary   artery.     Final impression:  Right dominant coronary artery system with an acute occlusion of the RCA  Successful angioplasty and stenting of the right coronary artery    Procedures performed:  Emergent coronary angiography  Emergent angioplasty and stenting of the proximal right coronary artery    History of present illness:  Mrs. Verdin is a pleasant 41-year-old woman who presents without known   coronary artery disease and sudden onset of chest pain.  She was noted in   the ambulance to have ST elevation in her inferior leads and was taken   directly to the cardiac catheterization laboratory.    Procedure in detail:  The patient was brought to the cardiac catheterization laboratory and   prepped and draped in the usual sterile fashion.  Adequate anesthesia was   obtained by infiltrating the right groin with local lidocaine.  Using a   modified similar technique a 6 Slovak sheath was placed in the right   femoral artery.  The patient was given adequate heparinization to achieve   an ACT greater than 200 with the use of heparin.  She was given IIb IIIa   inhibition given her acute presentation and the fact that she had not been   given Plavix preprocedure.  A 6 Slovak JR4 guiding catheter was used to   engage the ostium of the right coronary artery.  A BMW was placed   atraumatically in the distal portion of the right coronary artery.  The   lesion was predilated with a 2.0 mm balloon.  Mechanical thrombectomy was   then performed using a Pronto catheter.  A 2.75 x 18 mm Alpine   drug-eluting stent was then implanted at 12 parisa.  The balloon was   reinflated to 12  parisa.  After this orthogonal angiography revealed a large   thrombus proximal to the previously implanted stent.  It was felt that   this was most likely the site of plaque rupture and needed to be covered   with a second stent.  First mechanical thrombectomy was repeated with a   Pronto catheter which successfully removed a very large thrombus.  After   this a 3.0 x 15 Alpine drug-eluting stent was then deployed at 12 parisa in   overlapping distal stent by 3 to 4 mm.  Both stents were then postdilated   with a 3.0 mm noncompliant balloon.  Orthogonal angiography revealed an   excellent result without evidence of dissection there was no dye hang-up   there was PATRICIA-3 flow in the distal vascular bed.  The wire was withdrawn   and orthogonal angiography was repeated which confirmed the above-noted   results.  At this point a 5 B[place]an JL4 catheter was used to engage the   ostium of the left main coronary artery and angiography was repeated in   varying views using hand injection of contrast material.  The patient was   noted to have resolution of her chest discomfort but persistent ST   elevation in the inferior leads.  As such a 5 [place]an JR4 diagnostic   catheter was used to reengage the right coronary artery and angiography   was again performed of the right coronary artery.  This revealed the   stents to be patent, no evidence of thrombus, and no evidence of   dissection.  The patient was returned to the floor without evidence of   complication.    Findings in detail:  Left Main coronary artery:  The left main coronary artery is a large vessel which bifurcates into the   LAD and circumflex arteries.  The left main coronary artery is free of   atherosclerotic disease.    Left anterior descending artery:  The left anterior descending artery is a moderate to large vessel which   reaches the apex of the ventricle and gives rise to one moderate diagonal   branch.  There is an eccentric 30% lesion noted just proximal to the  first   diagonal branch.    Circumflex artery:  The circumflex artery is a large vessel which gives rise to 3 obtuse   marginal branches.  The first 2 obtuse marginal branches are moderate the   third of which is large.  There is a diffuse 30% lesion noted in the   distal portion of the circumflex artery just distal to the origin of the   second obtuse marginal branch.    Right coronary artery:  The right coronary artery is a large vessel which gives rise to the   posterior descending artery and several posterior lateral branches.  Prior   to intervention there is a 100% complete occlusion of the proximal right   coronary artery.  There is associated thrombus.  There is PATRICIA 0 flow and   the distal vessel.  This is the culprit lesion.  Postintervention this is   reduced to 0% residual stenosis without evidence of dissection and PATRICIA-3   flow in the distal vascular bed.  The lesion length is approximately 20   mm.  This is a type B or high risk lesion due to the presence of occlusive   thrombus.  The vessel diameter is approximately 3 mm.    Final impression and plan:  Overall it is my impression that Mrs. Verdin has undergone successful   percutaneous revascularization of her right coronary artery.  She will   undergo continued critical care monitoring over the next 24 hours and risk   factor modification as appropriate.               A/P:    CaD: asymptomatic now. Cont GDMT.     XOL: per PCP.    F/u  6months      Thank you for allowing me to participate in the care of Ekaterina Verdin. Feel free to contact me directly with any further questions or concerns.

## 2018-08-30 ENCOUNTER — OFFICE VISIT (OUTPATIENT)
Dept: FAMILY MEDICINE CLINIC | Facility: CLINIC | Age: 43
End: 2018-08-30

## 2018-08-30 VITALS
BODY MASS INDEX: 29.44 KG/M2 | TEMPERATURE: 98.5 F | OXYGEN SATURATION: 98 % | HEIGHT: 62 IN | WEIGHT: 160 LBS | SYSTOLIC BLOOD PRESSURE: 138 MMHG | DIASTOLIC BLOOD PRESSURE: 80 MMHG | HEART RATE: 75 BPM

## 2018-08-30 DIAGNOSIS — E78.2 MIXED HYPERLIPIDEMIA: ICD-10-CM

## 2018-08-30 DIAGNOSIS — M1A.09X0 IDIOPATHIC CHRONIC GOUT OF MULTIPLE SITES WITHOUT TOPHUS: Primary | ICD-10-CM

## 2018-08-30 DIAGNOSIS — F41.0 ANXIETY DISORDER DUE TO GENERAL MEDICAL CONDITION WITH PANIC ATTACK: ICD-10-CM

## 2018-08-30 DIAGNOSIS — I25.10 CORONARY ARTERY DISEASE INVOLVING NATIVE CORONARY ARTERY OF NATIVE HEART WITHOUT ANGINA PECTORIS: ICD-10-CM

## 2018-08-30 DIAGNOSIS — F06.4 ANXIETY DISORDER DUE TO GENERAL MEDICAL CONDITION WITH PANIC ATTACK: ICD-10-CM

## 2018-08-30 DIAGNOSIS — E55.9 VITAMIN D DEFICIENCY DISEASE: ICD-10-CM

## 2018-08-30 DIAGNOSIS — N18.1 CHRONIC KIDNEY DISEASE, STAGE 1, NORMAL OR INCREASED GFR: ICD-10-CM

## 2018-08-30 DIAGNOSIS — N18.2 CHRONIC KIDNEY DISEASE, STAGE 2, MILDLY DECREASED GFR: ICD-10-CM

## 2018-08-30 DIAGNOSIS — N18.2 CHRONIC KIDNEY DISEASE (CKD) STAGE G2/A1, MILDLY DECREASED GLOMERULAR FILTRATION RATE (GFR) BETWEEN 60-89 ML/MIN/1.73 SQUARE METER AND ALBUMINURIA CREATININE RATIO LESS THAN 30 MG/G: ICD-10-CM

## 2018-08-30 DIAGNOSIS — E66.3 OVERWEIGHT (BMI 25.0-29.9): ICD-10-CM

## 2018-08-30 PROBLEM — H61.21 EXCESSIVE CERUMEN IN RIGHT EAR CANAL: Status: RESOLVED | Noted: 2017-10-02 | Resolved: 2018-08-30

## 2018-08-30 PROBLEM — A04.8 H. PYLORI INFECTION: Status: RESOLVED | Noted: 2017-03-15 | Resolved: 2018-08-30

## 2018-08-30 PROBLEM — H92.11 EAR DRAINAGE RIGHT: Status: RESOLVED | Noted: 2017-10-03 | Resolved: 2018-08-30

## 2018-08-30 LAB
ALBUMIN SERPL-MCNC: 4.7 G/DL (ref 3.5–5)
ALBUMIN/GLOB SERPL: 1.7 G/DL (ref 1.5–2.5)
ALP SERPL-CCNC: 60 U/L (ref 35–104)
ALT SERPL W P-5'-P-CCNC: 19 U/L (ref 10–36)
ANION GAP SERPL CALCULATED.3IONS-SCNC: 4.8 MMOL/L (ref 3.6–11.2)
AST SERPL-CCNC: 17 U/L (ref 10–30)
BILIRUB SERPL-MCNC: 0.4 MG/DL (ref 0.2–1.8)
BUN BLD-MCNC: 10 MG/DL (ref 7–21)
BUN/CREAT SERPL: 10.8 (ref 7–25)
CALCIUM SPEC-SCNC: 9.5 MG/DL (ref 7.7–10)
CHLORIDE SERPL-SCNC: 105 MMOL/L (ref 99–112)
CO2 SERPL-SCNC: 29.2 MMOL/L (ref 24.3–31.9)
CREAT BLD-MCNC: 0.93 MG/DL (ref 0.43–1.29)
GFR SERPL CREATININE-BSD FRML MDRD: 65 ML/MIN/1.73
GLOBULIN UR ELPH-MCNC: 2.8 GM/DL
GLUCOSE BLD-MCNC: 98 MG/DL (ref 70–110)
OSMOLALITY SERPL CALC.SUM OF ELEC: 276.6 MOSM/KG (ref 273–305)
POTASSIUM BLD-SCNC: 4.2 MMOL/L (ref 3.5–5.3)
PROT SERPL-MCNC: 7.5 G/DL (ref 6–8)
SODIUM BLD-SCNC: 139 MMOL/L (ref 135–153)

## 2018-08-30 PROCEDURE — 80053 COMPREHEN METABOLIC PANEL: CPT | Performed by: NURSE PRACTITIONER

## 2018-08-30 PROCEDURE — 99396 PREV VISIT EST AGE 40-64: CPT | Performed by: NURSE PRACTITIONER

## 2018-08-30 RX ORDER — ALLOPURINOL 100 MG/1
100 TABLET ORAL DAILY
Qty: 30 TABLET | Refills: 5 | Status: SHIPPED | OUTPATIENT
Start: 2018-08-30 | End: 2019-02-20

## 2018-08-30 NOTE — PROGRESS NOTES
Subjective   Ekaterina Verdin is a 43 y.o. female.     Chief Complaint   Patient presents with   • physical       History of Present Illness     Patient is here today for routine physical.  She has been to see her cardiologist since her last visit.  She reports an improvement in her anxiety disorder.  Patient reports some social changes as she has filed for divorce and has started dating someone she is known since grade school.  She reports that her adult children are having some difficulty with this decision.  Patient states that she is happier than she has been in a very long time.    Has recent labs for review    Coronary artery disease with history of MI- currently on Plavix, aspirin, Lasix, lisinopril, metoprolol, potassium and Crestor.    GERD-improved.  Currently taking Prilosec.      New diagnosis after lab review of chronic kidney disease stage 2  New diagnosis of gout        The following portions of the patient's history were reviewed and updated as appropriate: allergies, current medications, past family history, past medical history, past social history, past surgical history and problem list.    Review of Systems   Constitutional: Negative for appetite change, fatigue and unexpected weight change.   HENT: Negative for congestion, ear pain, nosebleeds, postnasal drip, rhinorrhea, sinus pain, sinus pressure, sore throat, trouble swallowing and voice change.    Eyes: Negative for pain and visual disturbance.   Respiratory: Negative for cough, shortness of breath and wheezing.    Cardiovascular: Negative for chest pain, palpitations and leg swelling.   Gastrointestinal: Negative for abdominal pain, blood in stool, constipation and diarrhea.   Endocrine: Negative for cold intolerance and polydipsia.   Genitourinary: Negative for difficulty urinating, dysuria, flank pain, hematuria and pelvic pain.   Musculoskeletal: Positive for arthralgias (ANKLE AND POINTER FINGER ). Negative for back pain, gait problem, joint  "swelling and myalgias.   Skin: Negative for color change and rash.   Allergic/Immunologic: Negative.    Neurological: Negative for dizziness, syncope, numbness and headaches.   Hematological: Negative.    Psychiatric/Behavioral: Negative for behavioral problems, dysphoric mood, self-injury, sleep disturbance and suicidal ideas.   All other systems reviewed and are negative.       Objective     /80   Pulse 75   Temp 98.5 °F (36.9 °C) (Oral)   Ht 157.5 cm (62\")   Wt 72.6 kg (160 lb)   LMP 08/02/2018   SpO2 98%   BMI 29.26 kg/m²   Lab on 06/26/2018   Component Date Value Ref Range Status   • Glucose 06/26/2018 88  70 - 110 mg/dL Final   • BUN 06/26/2018 10  7 - 21 mg/dL Final   • Creatinine 06/26/2018 1.12  0.43 - 1.29 mg/dL Final   • Sodium 06/26/2018 141  135 - 153 mmol/L Final   • Potassium 06/26/2018 3.8  3.5 - 5.3 mmol/L Final   • Chloride 06/26/2018 106  99 - 112 mmol/L Final   • CO2 06/26/2018 28.9  24.3 - 31.9 mmol/L Final   • Calcium 06/26/2018 9.5  7.7 - 10.0 mg/dL Final   • Total Protein 06/26/2018 7.1  6.0 - 8.0 g/dL Final   • Albumin 06/26/2018 4.50  3.50 - 5.00 g/dL Final   • ALT (SGPT) 06/26/2018 21  10 - 36 U/L Final   • AST (SGOT) 06/26/2018 15  10 - 30 U/L Final   • Alkaline Phosphatase 06/26/2018 57  35 - 104 U/L Final   • Total Bilirubin 06/26/2018 0.4  0.2 - 1.8 mg/dL Final   • eGFR Non African Amer 06/26/2018 53* >60 mL/min/1.73 Final   • Globulin 06/26/2018 2.6  gm/dL Final   • A/G Ratio 06/26/2018 1.7  1.5 - 2.5 g/dL Final   • BUN/Creatinine Ratio 06/26/2018 8.9  7.0 - 25.0 Final   • Anion Gap 06/26/2018 6.1  3.6 - 11.2 mmol/L Final   • TSH 06/26/2018 1.083  0.550 - 4.780 mIU/mL Final   • Hemoglobin A1C 06/26/2018 5.50  4.50 - 5.70 % Final   • 25 Hydroxy, Vitamin D 06/26/2018 15.0  ng/ml Final   • Uric Acid 06/26/2018 6.0* 2.4 - 5.7 mg/dL Final   • Total Cholesterol 06/26/2018 114  0 - 200 mg/dL Final   • Triglycerides 06/26/2018 151* 0 - 150 mg/dL Final   • HDL Cholesterol " 06/26/2018 36* 60 - 100 mg/dL Final   • LDL Cholesterol  06/26/2018 48  0 - 100 mg/dL Final   • VLDL Cholesterol 06/26/2018 30.2  mg/dL Final   • LDL/HDL Ratio 06/26/2018 1.33   Final   • Vitamin B-12 06/26/2018 408  211 - 911 pg/mL Final   • HCG Quantitative 06/26/2018 <2.00  0.00 - 5.00 mIU/mL Final   • H. pylori IgG 06/26/2018 1.90* 0.00 - 0.79 Index Value Final   • H. pylori, IgA ABS 06/26/2018 <9.0  0.0 - 8.9 units Final   • H. Pylori, IgM 06/26/2018 <9.0  0.0 - 8.9 units Final   • WBC 06/26/2018 6.34  4.50 - 12.50 10*3/mm3 Final   • RBC 06/26/2018 4.32  4.20 - 5.40 10*6/mm3 Final   • Hemoglobin 06/26/2018 13.3  12.0 - 16.0 g/dL Final   • Hematocrit 06/26/2018 39.5  37.0 - 47.0 % Final   • MCV 06/26/2018 91.4  80.0 - 94.0 fL Final   • MCH 06/26/2018 30.8  27.0 - 33.0 pg Final   • MCHC 06/26/2018 33.7  33.0 - 37.0 g/dL Final   • RDW 06/26/2018 12.8  11.5 - 14.5 % Final   • RDW-SD 06/26/2018 41.9  37.0 - 54.0 fl Final   • MPV 06/26/2018 11.8* 6.0 - 10.0 fL Final   • Platelets 06/26/2018 226  130 - 400 10*3/mm3 Final   • Neutrophil % 06/26/2018 56.6  30.0 - 70.0 % Final   • Lymphocyte % 06/26/2018 32.2  21.0 - 51.0 % Final   • Monocyte % 06/26/2018 9.6  0.0 - 10.0 % Final   • Eosinophil % 06/26/2018 0.8  0.0 - 5.0 % Final   • Basophil % 06/26/2018 0.5  0.0 - 2.0 % Final   • Immature Grans % 06/26/2018 0.3  0.0 - 0.5 % Final   • Neutrophils, Absolute 06/26/2018 3.59  1.40 - 6.50 10*3/mm3 Final   • Lymphocytes, Absolute 06/26/2018 2.04  1.00 - 3.00 10*3/mm3 Final   • Monocytes, Absolute 06/26/2018 0.61  0.10 - 0.90 10*3/mm3 Final   • Eosinophils, Absolute 06/26/2018 0.05  0.00 - 0.70 10*3/mm3 Final   • Basophils, Absolute 06/26/2018 0.03  0.00 - 0.30 10*3/mm3 Final   • Immature Grans, Absolute 06/26/2018 0.02  0.00 - 0.03 10*3/mm3 Final   • Osmolality Calc 06/26/2018 279.7  273.0 - 305.0 mOsm/kg Final       Physical Exam   Constitutional: She is oriented to person, place, and time. Vital signs are normal. She  appears well-developed and well-nourished. No distress.   Very pleasant adult female here today.  She shows 6 pound weight loss and is sporting a new haircut and wearing makeup today.   HENT:   Head: Normocephalic.   Right Ear: External ear normal.   Left Ear: External ear normal.   Nose: Nose normal.   Mouth/Throat: Oropharynx is clear and moist. No oropharyngeal exudate.   Eyes: Pupils are equal, round, and reactive to light. Conjunctivae and EOM are normal. Right eye exhibits no discharge. Left eye exhibits no discharge.   Neck: Normal range of motion. Neck supple. No tracheal deviation present. No thyromegaly present.   Cardiovascular: Normal rate, regular rhythm and normal heart sounds.  Exam reveals no gallop and no friction rub.    No murmur heard.  Pulmonary/Chest: Effort normal and breath sounds normal. No respiratory distress. She has no wheezes. She has no rales. She exhibits no tenderness.   Abdominal: Soft. Bowel sounds are normal. She exhibits no distension and no mass. There is no tenderness. There is no rebound and no guarding.   Musculoskeletal: Normal range of motion.   Lymphadenopathy:     She has no cervical adenopathy.   Neurological: She is alert and oriented to person, place, and time. She has normal reflexes.   CN 2-12 grossly intact    Skin: Skin is warm and dry. Capillary refill takes less than 2 seconds. No rash noted. She is not diaphoretic. No erythema.   Psychiatric: She has a normal mood and affect. Her behavior is normal. Judgment and thought content normal.       Assessment/Plan     Problem List Items Addressed This Visit        Cardiovascular and Mediastinum    Coronary artery disease involving native coronary artery of native heart without angina pectoris    Mixed hyperlipidemia       Digestive    Vitamin D deficiency disease       Musculoskeletal and Integument    Chronic gout of multiple sites - Primary    Relevant Medications    allopurinol (ZYLOPRIM) 100 MG tablet        Genitourinary    Chronic kidney disease, stage 2, mildly decreased GFR       Other    Overweight (BMI 25.0-29.9)    Anxiety disorder due to general medical condition with panic attack        PHQ-9 Depression Screening  Little interest or pleasure in doing things? 0   Feeling down, depressed, or hopeless? 0   Trouble falling or staying asleep, or sleeping too much?  0   Feeling tired or having little energy?  0   Poor appetite or overeating?  0   Feeling bad about yourself - or that you are a failure or have let yourself or your family down?  0   Trouble concentrating on things, such as reading the newspaper or watching television?  0   Moving or speaking so slowly that other people could have noticed? Or the opposite - being so fidgety or restless that you have been moving around a lot more than usual?  0   Thoughts that you would be better off dead, or of hurting yourself in some way?  0   PHQ-9 Total Score 0   If you checked off any problems, how difficult have these problems made it for you to do your work, take care of things at home, or get along with other people?  0         Fall Risk Assessment  Fallen in past 6 months: 5--> Yes  Mental Status: 0--> no mental status change  Mobility: 0--> No mobility issues  Medications: 0--> No meds  Total Fall Risk Score: 5       Below are four things you can do to prevent falls:   1. Begin an exercise program to improve your leg strength & balance  2. Ask your doctor or pharmacist to review your medicines   3. Get annual eye check-ups & update your eyeglasses  4. Make your home safer by:  · Removing clutter & tripping hazards  · Putting railings on all stairs & adding grab bars in the bathroom  · Having good lighting, especially on stairs    Contact your local community or senior center for information on exercise, fall prevention programs, or options for improving home safety.      Age appropriate education and safety instruction has been provided. Preventive  education/recommendation > 15 minutes. Safety, accident prevention, vaccines, health maintenance concerns, nutrition, diet, exercise and social activity.     Patient's Body mass index is 29.26 kg/m². BMI is above normal parameters. Recommendations include: nutrition counseling.    Continue heart healthy diet.  Schedule Pap.  Pt has been instructed today regarding low fat heart smart diet. Weight management and routine exercise has been recommended. Avoid high fat foods, starchy foods and processed foods. Increase lean meats, fresh vegetables and fresh fruits.   Remain under the care of cardiology.  Repeat CMP today.  Discussed recent lab results.  New diagnosis of gout and stage II kidney disease.  Will repeat CMP today and refer to nephrology if continues to have decreased GFR.  Start allopurinol      I have discussed diagnosis in detail today allowing time for questions and answers. Pt is aware of reasons to seek urgent or emergent medical care as well as reasons to return to the clinic for evaluation. Possible side effects, interactions and progression of symptoms discussed as well. Pt / family states understanding.   Emotional support and active listening provided.     Follow up 3 months, sooner if needed.   Routine labs every 3-6 months.           This document has been electronically signed by:  ILEANA James, NP-C

## 2018-10-04 RX ORDER — FLUCONAZOLE 150 MG/1
TABLET ORAL
Qty: 1 TABLET | Refills: 3 | Status: SHIPPED | OUTPATIENT
Start: 2018-10-04 | End: 2019-11-13

## 2019-02-20 ENCOUNTER — OFFICE VISIT (OUTPATIENT)
Dept: FAMILY MEDICINE CLINIC | Facility: CLINIC | Age: 44
End: 2019-02-20

## 2019-02-20 VITALS
DIASTOLIC BLOOD PRESSURE: 80 MMHG | HEART RATE: 66 BPM | HEIGHT: 62 IN | SYSTOLIC BLOOD PRESSURE: 130 MMHG | BODY MASS INDEX: 30.55 KG/M2 | OXYGEN SATURATION: 97 % | WEIGHT: 166 LBS | TEMPERATURE: 99.3 F

## 2019-02-20 DIAGNOSIS — N18.2 CHRONIC KIDNEY DISEASE, STAGE 2, MILDLY DECREASED GFR: ICD-10-CM

## 2019-02-20 DIAGNOSIS — I25.10 CVD (CARDIOVASCULAR DISEASE): Primary | ICD-10-CM

## 2019-02-20 DIAGNOSIS — H61.23 EXCESSIVE CERUMEN IN BOTH EAR CANALS: ICD-10-CM

## 2019-02-20 DIAGNOSIS — E55.9 VITAMIN D DEFICIENCY DISEASE: ICD-10-CM

## 2019-02-20 DIAGNOSIS — I25.10 CORONARY ARTERY DISEASE INVOLVING NATIVE CORONARY ARTERY OF NATIVE HEART WITHOUT ANGINA PECTORIS: ICD-10-CM

## 2019-02-20 DIAGNOSIS — M79.645 PAIN IN FINGER OF LEFT HAND: Primary | ICD-10-CM

## 2019-02-20 DIAGNOSIS — M1A.09X0 IDIOPATHIC CHRONIC GOUT OF MULTIPLE SITES WITHOUT TOPHUS: ICD-10-CM

## 2019-02-20 DIAGNOSIS — F06.4 ANXIETY DISORDER DUE TO GENERAL MEDICAL CONDITION WITH PANIC ATTACK: ICD-10-CM

## 2019-02-20 DIAGNOSIS — E78.2 MIXED HYPERLIPIDEMIA: ICD-10-CM

## 2019-02-20 DIAGNOSIS — Z23 ENCOUNTER FOR IMMUNIZATION: ICD-10-CM

## 2019-02-20 DIAGNOSIS — E53.8 VITAMIN B 12 DEFICIENCY: ICD-10-CM

## 2019-02-20 DIAGNOSIS — F41.0 ANXIETY DISORDER DUE TO GENERAL MEDICAL CONDITION WITH PANIC ATTACK: ICD-10-CM

## 2019-02-20 PROCEDURE — 96372 THER/PROPH/DIAG INJ SC/IM: CPT | Performed by: NURSE PRACTITIONER

## 2019-02-20 PROCEDURE — 90471 IMMUNIZATION ADMIN: CPT | Performed by: NURSE PRACTITIONER

## 2019-02-20 PROCEDURE — 99214 OFFICE O/P EST MOD 30 MIN: CPT | Performed by: NURSE PRACTITIONER

## 2019-02-20 PROCEDURE — 69210 REMOVE IMPACTED EAR WAX UNI: CPT | Performed by: NURSE PRACTITIONER

## 2019-02-20 PROCEDURE — 90686 IIV4 VACC NO PRSV 0.5 ML IM: CPT | Performed by: NURSE PRACTITIONER

## 2019-02-20 RX ORDER — LORATADINE 10 MG/1
10 TABLET ORAL DAILY PRN
Qty: 30 TABLET | Refills: 5 | Status: SHIPPED | OUTPATIENT
Start: 2019-02-20 | End: 2020-06-03 | Stop reason: SDUPTHER

## 2019-02-20 RX ORDER — POTASSIUM CHLORIDE 750 MG/1
10 TABLET, EXTENDED RELEASE ORAL DAILY
Qty: 30 TABLET | Refills: 5 | Status: SHIPPED | OUTPATIENT
Start: 2019-02-20 | End: 2019-11-13 | Stop reason: SDUPTHER

## 2019-02-20 RX ORDER — ROSUVASTATIN CALCIUM 20 MG/1
20 TABLET, COATED ORAL DAILY
Qty: 30 TABLET | Refills: 11 | Status: SHIPPED | OUTPATIENT
Start: 2019-02-20 | End: 2020-05-13

## 2019-02-20 RX ORDER — BUPROPION HYDROCHLORIDE 300 MG/1
300 TABLET ORAL EVERY MORNING
Qty: 90 TABLET | Refills: 1 | Status: SHIPPED | OUTPATIENT
Start: 2019-02-20 | End: 2019-11-13 | Stop reason: SDUPTHER

## 2019-02-20 RX ORDER — KETOROLAC TROMETHAMINE 30 MG/ML
60 INJECTION, SOLUTION INTRAMUSCULAR; INTRAVENOUS ONCE
Status: DISCONTINUED | OUTPATIENT
Start: 2019-02-20 | End: 2019-02-20

## 2019-02-20 RX ORDER — FUROSEMIDE 20 MG/1
20 TABLET ORAL DAILY PRN
Qty: 30 TABLET | Refills: 5 | Status: SHIPPED | OUTPATIENT
Start: 2019-02-20 | End: 2020-06-03 | Stop reason: SDUPTHER

## 2019-02-20 RX ORDER — CLOPIDOGREL BISULFATE 75 MG/1
75 TABLET ORAL DAILY
Qty: 30 TABLET | Refills: 5 | Status: SHIPPED | OUTPATIENT
Start: 2019-02-20 | End: 2019-11-13 | Stop reason: SDUPTHER

## 2019-02-20 RX ORDER — LISINOPRIL 5 MG/1
5 TABLET ORAL DAILY
Qty: 30 TABLET | Refills: 5 | Status: SHIPPED | OUTPATIENT
Start: 2019-02-20 | End: 2019-11-13 | Stop reason: SDUPTHER

## 2019-02-20 RX ORDER — ASPIRIN 325 MG
325 TABLET, DELAYED RELEASE (ENTERIC COATED) ORAL DAILY
Qty: 30 TABLET | Refills: 5 | Status: SHIPPED | OUTPATIENT
Start: 2019-02-20 | End: 2019-11-13 | Stop reason: SDUPTHER

## 2019-02-20 RX ORDER — CYANOCOBALAMIN 1000 UG/ML
1000 INJECTION, SOLUTION INTRAMUSCULAR; SUBCUTANEOUS
Status: DISCONTINUED | OUTPATIENT
Start: 2019-02-20 | End: 2021-04-19

## 2019-02-20 RX ORDER — OMEPRAZOLE 40 MG/1
40 CAPSULE, DELAYED RELEASE ORAL DAILY
Qty: 30 CAPSULE | Refills: 5 | Status: SHIPPED | OUTPATIENT
Start: 2019-02-20 | End: 2020-01-06

## 2019-02-20 RX ORDER — METOPROLOL SUCCINATE 25 MG/1
12.5 TABLET, EXTENDED RELEASE ORAL DAILY
Qty: 30 TABLET | Refills: 11 | Status: SHIPPED | OUTPATIENT
Start: 2019-02-20 | End: 2020-06-03 | Stop reason: SDUPTHER

## 2019-02-20 RX ADMIN — CYANOCOBALAMIN 1000 MCG: 1000 INJECTION, SOLUTION INTRAMUSCULAR; SUBCUTANEOUS at 13:26

## 2019-02-20 NOTE — PROGRESS NOTES
Subjective   Ekaterina Verdin is a 44 y.o. female.     Chief Complaint   Patient presents with   • Hyperlipidemia   • Anxiety       History of Present Illness:    Patient is here to follow-up on multiple chronic disease processes.    Gout-patient has not been taking her allopurinol for several months.  She denies any gout flareup.    Vitamin D deficiency-patient does take vitamin D supplements at times.  She is not currently taking 1.    B-12 deficiency-patient was receiving a vitamin B12 shot at home given by her son.  She is requested*having those in the office every month.    Mixed hyperlipidemia-stable on Crestor    Cardiovascular disease with history of MI-patient is under the care of [facility] interventional cardiology.  She does have a follow up scheduled next week.    Obesity-patient has had some weight gain over the past few months.  She reports that she has not been sticking as closely to her diet or exercising during the winter months.    Chronic kidney disease stage II-patient is avoiding all anti-inflammatory medication and trying to avoid sodium in her diet.  She is drinking well.  Has been evaluated by nephrology in the past.    Anxiety disorder with history of depression-patient receives Wellbutrin 300 mg daily.  She states this is helpful to control her symptoms.  She denies any thoughts of hurting self or others.        The following portions of the patient's history were reviewed and updated as appropriate:  Allergies, current medications, past family history, past medical history, past social history, past surgical history and problem list.    Review of Systems   Constitutional: Positive for unexpected weight change. Negative for appetite change and fatigue.   HENT: Positive for ear discharge. Negative for congestion, ear pain, nosebleeds, postnasal drip, rhinorrhea, sinus pressure, sinus pain, sore throat, trouble swallowing and voice change.    Eyes: Negative for pain and visual disturbance.  "  Respiratory: Negative for cough, shortness of breath and wheezing.    Cardiovascular: Negative for chest pain and palpitations.   Gastrointestinal: Negative for abdominal pain, blood in stool, constipation and diarrhea.   Endocrine: Negative for cold intolerance and polydipsia.   Genitourinary: Negative for difficulty urinating, flank pain and hematuria.   Musculoskeletal: Negative for arthralgias, back pain, gait problem, joint swelling and myalgias.   Skin: Negative for color change and rash.   Allergic/Immunologic: Negative.    Neurological: Negative for syncope, numbness and headaches.   Hematological: Negative.    Psychiatric/Behavioral: Negative for dysphoric mood, self-injury, sleep disturbance and suicidal ideas.   All other systems reviewed and are negative.      Objective     /80   Pulse 66   Temp 99.3 °F (37.4 °C) (Tympanic)   Ht 157.5 cm (62\")   Wt 75.3 kg (166 lb)   LMP 02/20/2019   SpO2 97%   BMI 30.36 kg/m²   Office Visit on 08/30/2018   Component Date Value Ref Range Status   • Glucose 08/30/2018 98  70 - 110 mg/dL Final   • BUN 08/30/2018 10  7 - 21 mg/dL Final   • Creatinine 08/30/2018 0.93  0.43 - 1.29 mg/dL Final   • Sodium 08/30/2018 139  135 - 153 mmol/L Final   • Potassium 08/30/2018 4.2  3.5 - 5.3 mmol/L Final   • Chloride 08/30/2018 105  99 - 112 mmol/L Final   • CO2 08/30/2018 29.2  24.3 - 31.9 mmol/L Final   • Calcium 08/30/2018 9.5  7.7 - 10.0 mg/dL Final   • Total Protein 08/30/2018 7.5  6.0 - 8.0 g/dL Final   • Albumin 08/30/2018 4.70  3.50 - 5.00 g/dL Final   • ALT (SGPT) 08/30/2018 19  10 - 36 U/L Final   • AST (SGOT) 08/30/2018 17  10 - 30 U/L Final   • Alkaline Phosphatase 08/30/2018 60  35 - 104 U/L Final   • Total Bilirubin 08/30/2018 0.4  0.2 - 1.8 mg/dL Final   • eGFR Non  Amer 08/30/2018 65  >60 mL/min/1.73 Final   • Globulin 08/30/2018 2.8  gm/dL Final   • A/G Ratio 08/30/2018 1.7  1.5 - 2.5 g/dL Final   • BUN/Creatinine Ratio 08/30/2018 10.8  7.0 - 25.0 " Final   • Anion Gap 08/30/2018 4.8  3.6 - 11.2 mmol/L Final   • Osmolality Calc 08/30/2018 276.6  273.0 - 305.0 mOsm/kg Final       Physical Exam   Constitutional: She is oriented to person, place, and time. Vital signs are normal. She appears well-developed and well-nourished. No distress.   HENT:   Head: Normocephalic.   Right Ear: Tympanic membrane and external ear normal. There is drainage.   Left Ear: Tympanic membrane and external ear normal. There is drainage.   Nose: Nose normal.   Mouth/Throat: Oropharynx is clear and moist. No oropharyngeal exudate.   Bilateral ear canals impacted with cerumen, removed via provider with flexi-loop. Honey colored cerumen removed, tolerated well.      Eyes: Conjunctivae, EOM and lids are normal. Pupils are equal, round, and reactive to light. Right eye exhibits no discharge. Left eye exhibits no discharge.   Neck: Normal range of motion. Neck supple. No tracheal deviation present. No thyromegaly present.   Cardiovascular: Normal rate, regular rhythm and normal heart sounds. Exam reveals no gallop and no friction rub.   No murmur heard.  Pulmonary/Chest: Effort normal and breath sounds normal. No respiratory distress. She has no wheezes. She has no rales. She exhibits no tenderness.   Abdominal: Soft. Normal appearance and bowel sounds are normal. She exhibits no distension and no mass. There is no tenderness. There is no rebound and no guarding.   Musculoskeletal: Normal range of motion.   Lymphadenopathy:     She has no cervical adenopathy.   Neurological: She is alert and oriented to person, place, and time. She has normal reflexes.   CN 2-12 grossly intact    Skin: Skin is warm and dry. Capillary refill takes less than 2 seconds. No rash noted. She is not diaphoretic. No erythema.   Psychiatric: She has a normal mood and affect. Her speech is normal and behavior is normal. Judgment and thought content normal. Cognition and memory are normal.   Vitals  reviewed.      Assessment/Plan     Problem List Items Addressed This Visit        Cardiovascular and Mediastinum    Coronary artery disease involving native coronary artery of native heart without angina pectoris    Relevant Medications    clopidogrel (PLAVIX) 75 MG tablet    metoprolol succinate XL (TOPROL-XL) 25 MG 24 hr tablet    cyanocobalamin injection 1,000 mcg    Other Relevant Orders    Comprehensive Metabolic Panel    CBC & Differential    TSH    Hemoglobin A1c    Vitamin D 25 Hydroxy    Lipid Panel    Uric Acid    TSH    Mixed hyperlipidemia    Relevant Medications    rosuvastatin (CRESTOR) 20 MG tablet    cyanocobalamin injection 1,000 mcg    Other Relevant Orders    Comprehensive Metabolic Panel    CBC & Differential    TSH    Hemoglobin A1c    Vitamin D 25 Hydroxy    Lipid Panel    Uric Acid    TSH    CVD (cardiovascular disease) - Primary    Overview     History of MI 2016         Relevant Medications    cyanocobalamin injection 1,000 mcg    Other Relevant Orders    Comprehensive Metabolic Panel    CBC & Differential    TSH    Hemoglobin A1c    Vitamin D 25 Hydroxy    Lipid Panel    Uric Acid    TSH       Digestive    Vitamin D deficiency disease    Relevant Medications    cyanocobalamin injection 1,000 mcg    Other Relevant Orders    Comprehensive Metabolic Panel    CBC & Differential    TSH    Hemoglobin A1c    Vitamin D 25 Hydroxy    Lipid Panel    Uric Acid    TSH    Vitamin B 12 deficiency    Overview     Monthly injection per standing order            Nervous and Auditory    Excessive cerumen in both ear canals       Musculoskeletal and Integument    Chronic gout of multiple sites    Relevant Medications    cyanocobalamin injection 1,000 mcg    Other Relevant Orders    Comprehensive Metabolic Panel    CBC & Differential    TSH    Hemoglobin A1c    Vitamin D 25 Hydroxy    Lipid Panel    Uric Acid    TSH       Genitourinary    Chronic kidney disease, stage 2, mildly decreased GFR    Relevant  Medications    furosemide (LASIX) 20 MG tablet    cyanocobalamin injection 1,000 mcg    Other Relevant Orders    Comprehensive Metabolic Panel    CBC & Differential    TSH    Hemoglobin A1c    Vitamin D 25 Hydroxy    Lipid Panel    Uric Acid    TSH       Other    Anxiety disorder due to general medical condition with panic attack    Relevant Medications    buPROPion XL (WELLBUTRIN XL) 300 MG 24 hr tablet      Other Visit Diagnoses     Encounter for immunization        Relevant Orders    Fluarix/Fluzone/Afluria Quad/FluLaval Quad (Completed)          After obtaining informed consent, the immunization is given by staff.  Pt has been instructed today regarding low fat heart smart diet. Weight management and routine exercise has been recommended. Avoid high fat foods, starchy foods and processed foods. Increase lean meats, fresh vegetables and fresh fruits.   Refill routine medication.  Keep follow-up with cardiology.  Schedule fasting labs and an annual wellness exam in the next 2-3 months.         Patient's Body mass index is 30.36 kg/m². BMI is above normal parameters. Recommendations include: exercise counseling and nutrition counseling.      I have discussed diagnosis in detail today allowing time for questions and answers. Patient is aware of reasons to seek urgent or emergent medical care as well as reasons to return to the clinic for evaluation. Possible side effects, interactions and progression of symptoms discussed as well. Patient / family states understanding.   Emotional support and active listening provided.       Current Outpatient Medications:   •  aspirin  MG tablet, Take 1 tablet by mouth Daily., Disp: 30 tablet, Rfl: 5  •  buPROPion XL (WELLBUTRIN XL) 300 MG 24 hr tablet, Take 1 tablet by mouth Every Morning., Disp: 90 tablet, Rfl: 1  •  clopidogrel (PLAVIX) 75 MG tablet, Take 1 tablet by mouth Daily., Disp: 30 tablet, Rfl: 5  •  cyanocobalamin 1000 MCG/ML injection, Inject 1 mL into the  shoulder, thigh, or buttocks Every 28 (Twenty-Eight) Days. Given at home, Disp: 1 mL, Rfl: 11  •  fluconazole (DIFLUCAN) 150 MG tablet, TAKE ONE TABLET BY MOUTH FOR A ONE TIME DOSE, Disp: 1 tablet, Rfl: 3  •  furosemide (LASIX) 20 MG tablet, Take 1 tablet by mouth Daily As Needed (swelling)., Disp: 30 tablet, Rfl: 5  •  lisinopril (PRINIVIL,ZESTRIL) 5 MG tablet, Take 1 tablet by mouth Daily., Disp: 30 tablet, Rfl: 5  •  loratadine (CLARITIN) 10 MG tablet, Take 1 tablet by mouth Daily As Needed for Allergies., Disp: 30 tablet, Rfl: 5  •  metoprolol succinate XL (TOPROL-XL) 25 MG 24 hr tablet, Take 0.5 tablets by mouth Daily., Disp: 30 tablet, Rfl: 11  •  omeprazole (priLOSEC) 40 MG capsule, Take 1 capsule by mouth Daily., Disp: 30 capsule, Rfl: 5  •  potassium chloride (K-DUR,KLOR-CON) 10 MEQ CR tablet, Take 1 tablet by mouth Daily. Take only when take lasix, Disp: 30 tablet, Rfl: 5  •  rosuvastatin (CRESTOR) 20 MG tablet, Take 1 tablet by mouth Daily., Disp: 30 tablet, Rfl: 11    Current Facility-Administered Medications:   •  cyanocobalamin injection 1,000 mcg, 1,000 mcg, Intramuscular, Q30 Days, Ashley Alejandre APRN, 1,000 mcg at 02/20/19 1326      Follow up in 2-3 months. Routine labs fasting one week prior to next office visit. Return sooner if needed.     Dictated utilizing Dragon dictation        This document has been electronically signed by:  ILEANA James, NP-C

## 2019-02-26 ENCOUNTER — LAB (OUTPATIENT)
Dept: FAMILY MEDICINE CLINIC | Facility: CLINIC | Age: 44
End: 2019-02-26

## 2019-02-26 DIAGNOSIS — E78.2 MIXED HYPERLIPIDEMIA: ICD-10-CM

## 2019-02-26 DIAGNOSIS — N18.2 CHRONIC KIDNEY DISEASE, STAGE 2, MILDLY DECREASED GFR: ICD-10-CM

## 2019-02-26 DIAGNOSIS — M1A.09X0 IDIOPATHIC CHRONIC GOUT OF MULTIPLE SITES WITHOUT TOPHUS: ICD-10-CM

## 2019-02-26 DIAGNOSIS — I25.10 CVD (CARDIOVASCULAR DISEASE): ICD-10-CM

## 2019-02-26 DIAGNOSIS — E55.9 VITAMIN D DEFICIENCY DISEASE: ICD-10-CM

## 2019-02-26 DIAGNOSIS — I25.10 CORONARY ARTERY DISEASE INVOLVING NATIVE CORONARY ARTERY OF NATIVE HEART WITHOUT ANGINA PECTORIS: ICD-10-CM

## 2019-02-26 LAB
25(OH)D3 SERPL-MCNC: 20 NG/ML
ALBUMIN SERPL-MCNC: 4.4 G/DL (ref 3.5–5)
ALBUMIN/GLOB SERPL: 1.6 G/DL (ref 1.5–2.5)
ALP SERPL-CCNC: 49 U/L (ref 35–104)
ALT SERPL W P-5'-P-CCNC: 15 U/L (ref 10–36)
ANION GAP SERPL CALCULATED.3IONS-SCNC: 7.8 MMOL/L (ref 3.6–11.2)
AST SERPL-CCNC: 18 U/L (ref 10–30)
BASOPHILS # BLD AUTO: 0.05 10*3/MM3 (ref 0–0.3)
BASOPHILS NFR BLD AUTO: 0.6 % (ref 0–2)
BILIRUB SERPL-MCNC: 0.3 MG/DL (ref 0.2–1.8)
BUN BLD-MCNC: 13 MG/DL (ref 7–21)
BUN/CREAT SERPL: 14.3 (ref 7–25)
CALCIUM SPEC-SCNC: 9.4 MG/DL (ref 7.7–10)
CHLORIDE SERPL-SCNC: 104 MMOL/L (ref 99–112)
CHOLEST SERPL-MCNC: 245 MG/DL (ref 0–200)
CO2 SERPL-SCNC: 27.2 MMOL/L (ref 24.3–31.9)
CREAT BLD-MCNC: 0.91 MG/DL (ref 0.43–1.29)
DEPRECATED RDW RBC AUTO: 40.9 FL (ref 37–54)
EOSINOPHIL # BLD AUTO: 0.11 10*3/MM3 (ref 0–0.7)
EOSINOPHIL NFR BLD AUTO: 1.4 % (ref 0–5)
ERYTHROCYTE [DISTWIDTH] IN BLOOD BY AUTOMATED COUNT: 12.6 % (ref 11.5–14.5)
GFR SERPL CREATININE-BSD FRML MDRD: 67 ML/MIN/1.73
GLOBULIN UR ELPH-MCNC: 2.8 GM/DL
GLUCOSE BLD-MCNC: 101 MG/DL (ref 70–110)
HBA1C MFR BLD: 5.8 % (ref 4.5–5.7)
HCT VFR BLD AUTO: 39.6 % (ref 37–47)
HDLC SERPL-MCNC: 44 MG/DL (ref 60–100)
HGB BLD-MCNC: 14 G/DL (ref 12–16)
IMM GRANULOCYTES # BLD AUTO: 0.02 10*3/MM3 (ref 0–0.03)
IMM GRANULOCYTES NFR BLD AUTO: 0.3 % (ref 0–0.5)
LDLC SERPL CALC-MCNC: 132 MG/DL (ref 0–100)
LDLC/HDLC SERPL: 3 {RATIO}
LYMPHOCYTES # BLD AUTO: 1.87 10*3/MM3 (ref 1–3)
LYMPHOCYTES NFR BLD AUTO: 23.9 % (ref 21–51)
MCH RBC QN AUTO: 32.6 PG (ref 27–33)
MCHC RBC AUTO-ENTMCNC: 35.4 G/DL (ref 33–37)
MCV RBC AUTO: 92.1 FL (ref 80–94)
MONOCYTES # BLD AUTO: 0.55 10*3/MM3 (ref 0.1–0.9)
MONOCYTES NFR BLD AUTO: 7 % (ref 0–10)
NEUTROPHILS # BLD AUTO: 5.23 10*3/MM3 (ref 1.4–6.5)
NEUTROPHILS NFR BLD AUTO: 66.8 % (ref 30–70)
OSMOLALITY SERPL CALC.SUM OF ELEC: 277.8 MOSM/KG (ref 273–305)
PLATELET # BLD AUTO: 255 10*3/MM3 (ref 130–400)
PMV BLD AUTO: 11.7 FL (ref 6–10)
POTASSIUM BLD-SCNC: 3.9 MMOL/L (ref 3.5–5.3)
PROT SERPL-MCNC: 7.2 G/DL (ref 6–8)
RBC # BLD AUTO: 4.3 10*6/MM3 (ref 4.2–5.4)
SODIUM BLD-SCNC: 139 MMOL/L (ref 135–153)
TRIGL SERPL-MCNC: 345 MG/DL (ref 0–150)
TSH SERPL DL<=0.05 MIU/L-ACNC: 0.67 MIU/ML (ref 0.55–4.78)
URATE SERPL-MCNC: 5.7 MG/DL (ref 2.4–5.7)
VLDLC SERPL-MCNC: 69 MG/DL
WBC NRBC COR # BLD: 7.83 10*3/MM3 (ref 4.5–12.5)

## 2019-02-26 PROCEDURE — 82306 VITAMIN D 25 HYDROXY: CPT | Performed by: NURSE PRACTITIONER

## 2019-02-26 PROCEDURE — 80050 GENERAL HEALTH PANEL: CPT | Performed by: NURSE PRACTITIONER

## 2019-02-26 PROCEDURE — 84550 ASSAY OF BLOOD/URIC ACID: CPT | Performed by: NURSE PRACTITIONER

## 2019-02-26 PROCEDURE — 83036 HEMOGLOBIN GLYCOSYLATED A1C: CPT | Performed by: NURSE PRACTITIONER

## 2019-02-26 PROCEDURE — 80061 LIPID PANEL: CPT | Performed by: NURSE PRACTITIONER

## 2019-03-27 ENCOUNTER — CLINICAL SUPPORT (OUTPATIENT)
Dept: FAMILY MEDICINE CLINIC | Facility: CLINIC | Age: 44
End: 2019-03-27

## 2019-03-27 DIAGNOSIS — E53.8 B12 DEFICIENCY: Primary | ICD-10-CM

## 2019-03-27 PROCEDURE — 96372 THER/PROPH/DIAG INJ SC/IM: CPT | Performed by: NURSE PRACTITIONER

## 2019-03-27 RX ADMIN — CYANOCOBALAMIN 1000 MCG: 1000 INJECTION, SOLUTION INTRAMUSCULAR; SUBCUTANEOUS at 13:54

## 2019-04-15 ENCOUNTER — CLINICAL SUPPORT (OUTPATIENT)
Dept: FAMILY MEDICINE CLINIC | Facility: CLINIC | Age: 44
End: 2019-04-15

## 2019-04-15 DIAGNOSIS — E53.8 VITAMIN B 12 DEFICIENCY: ICD-10-CM

## 2019-04-15 PROCEDURE — 96372 THER/PROPH/DIAG INJ SC/IM: CPT | Performed by: NURSE PRACTITIONER

## 2019-04-15 RX ADMIN — CYANOCOBALAMIN 1000 MCG: 1000 INJECTION, SOLUTION INTRAMUSCULAR; SUBCUTANEOUS at 13:39

## 2019-05-22 ENCOUNTER — CLINICAL SUPPORT (OUTPATIENT)
Dept: FAMILY MEDICINE CLINIC | Facility: CLINIC | Age: 44
End: 2019-05-22

## 2019-05-22 DIAGNOSIS — E53.8 VITAMIN B 12 DEFICIENCY: ICD-10-CM

## 2019-05-22 PROCEDURE — 96372 THER/PROPH/DIAG INJ SC/IM: CPT | Performed by: NURSE PRACTITIONER

## 2019-05-22 RX ADMIN — CYANOCOBALAMIN 1000 MCG: 1000 INJECTION, SOLUTION INTRAMUSCULAR; SUBCUTANEOUS at 13:58

## 2019-11-13 ENCOUNTER — TELEPHONE (OUTPATIENT)
Dept: FAMILY MEDICINE CLINIC | Facility: CLINIC | Age: 44
End: 2019-11-13

## 2019-11-13 ENCOUNTER — OFFICE VISIT (OUTPATIENT)
Dept: FAMILY MEDICINE CLINIC | Facility: CLINIC | Age: 44
End: 2019-11-13

## 2019-11-13 VITALS
HEIGHT: 62 IN | TEMPERATURE: 99.5 F | HEART RATE: 72 BPM | WEIGHT: 167 LBS | BODY MASS INDEX: 30.73 KG/M2 | DIASTOLIC BLOOD PRESSURE: 90 MMHG | OXYGEN SATURATION: 98 % | SYSTOLIC BLOOD PRESSURE: 160 MMHG

## 2019-11-13 DIAGNOSIS — E53.8 VITAMIN B 12 DEFICIENCY: ICD-10-CM

## 2019-11-13 DIAGNOSIS — F06.4 ANXIETY DISORDER DUE TO GENERAL MEDICAL CONDITION WITH PANIC ATTACK: ICD-10-CM

## 2019-11-13 DIAGNOSIS — Z23 ENCOUNTER FOR IMMUNIZATION: Primary | ICD-10-CM

## 2019-11-13 DIAGNOSIS — I10 ESSENTIAL HYPERTENSION: ICD-10-CM

## 2019-11-13 DIAGNOSIS — E55.9 VITAMIN D DEFICIENCY DISEASE: ICD-10-CM

## 2019-11-13 DIAGNOSIS — F41.0 ANXIETY DISORDER DUE TO GENERAL MEDICAL CONDITION WITH PANIC ATTACK: ICD-10-CM

## 2019-11-13 DIAGNOSIS — H61.23 EXCESSIVE CERUMEN IN BOTH EAR CANALS: ICD-10-CM

## 2019-11-13 DIAGNOSIS — R42 VERTIGO: ICD-10-CM

## 2019-11-13 DIAGNOSIS — I25.10 CORONARY ARTERY DISEASE INVOLVING NATIVE CORONARY ARTERY OF NATIVE HEART WITHOUT ANGINA PECTORIS: ICD-10-CM

## 2019-11-13 DIAGNOSIS — I25.10 CVD (CARDIOVASCULAR DISEASE): ICD-10-CM

## 2019-11-13 PROCEDURE — 90674 CCIIV4 VAC NO PRSV 0.5 ML IM: CPT | Performed by: NURSE PRACTITIONER

## 2019-11-13 PROCEDURE — 69210 REMOVE IMPACTED EAR WAX UNI: CPT | Performed by: NURSE PRACTITIONER

## 2019-11-13 PROCEDURE — 96372 THER/PROPH/DIAG INJ SC/IM: CPT | Performed by: NURSE PRACTITIONER

## 2019-11-13 PROCEDURE — 99214 OFFICE O/P EST MOD 30 MIN: CPT | Performed by: NURSE PRACTITIONER

## 2019-11-13 PROCEDURE — 90471 IMMUNIZATION ADMIN: CPT | Performed by: NURSE PRACTITIONER

## 2019-11-13 RX ORDER — BUPROPION HYDROCHLORIDE 300 MG/1
300 TABLET ORAL EVERY MORNING
Qty: 90 TABLET | Refills: 1 | Status: SHIPPED | OUTPATIENT
Start: 2019-11-13 | End: 2020-06-03 | Stop reason: SDUPTHER

## 2019-11-13 RX ORDER — CLOPIDOGREL BISULFATE 75 MG/1
75 TABLET ORAL DAILY
Qty: 30 TABLET | Refills: 5 | Status: SHIPPED | OUTPATIENT
Start: 2019-11-13 | End: 2020-06-03 | Stop reason: SDUPTHER

## 2019-11-13 RX ORDER — LISINOPRIL 10 MG/1
10 TABLET ORAL DAILY
Qty: 30 TABLET | Refills: 5 | Status: SHIPPED | OUTPATIENT
Start: 2019-11-13 | End: 2020-01-06 | Stop reason: SDUPTHER

## 2019-11-13 RX ORDER — POTASSIUM CHLORIDE 750 MG/1
10 TABLET, EXTENDED RELEASE ORAL DAILY
Qty: 30 TABLET | Refills: 5 | Status: SHIPPED | OUTPATIENT
Start: 2019-11-13 | End: 2020-06-03 | Stop reason: SDUPTHER

## 2019-11-13 RX ORDER — ASPIRIN 325 MG
325 TABLET, DELAYED RELEASE (ENTERIC COATED) ORAL DAILY
Qty: 30 TABLET | Refills: 5 | Status: SHIPPED | OUTPATIENT
Start: 2019-11-13 | End: 2019-11-15

## 2019-11-13 RX ORDER — NITROGLYCERIN 0.3 MG/1
0.3 TABLET SUBLINGUAL
Qty: 30 TABLET | Refills: 12 | Status: SHIPPED | OUTPATIENT
Start: 2019-11-13 | End: 2020-06-03 | Stop reason: SDUPTHER

## 2019-11-13 RX ORDER — MECLIZINE HYDROCHLORIDE 25 MG/1
25 TABLET ORAL 3 TIMES DAILY PRN
Qty: 60 TABLET | Refills: 5 | Status: SHIPPED | OUTPATIENT
Start: 2019-11-13 | End: 2020-06-03 | Stop reason: SDUPTHER

## 2019-11-13 RX ADMIN — CYANOCOBALAMIN 1000 MCG: 1000 INJECTION, SOLUTION INTRAMUSCULAR; SUBCUTANEOUS at 16:33

## 2019-11-13 NOTE — TELEPHONE ENCOUNTER
I called cardiology at [facility] she has appt NOV 15 at 10:15 and pt is here and I will make sure she knows

## 2019-11-13 NOTE — PROGRESS NOTES
Subjective   Ekaterina Verdin is a 44 y.o. female.     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  Works 7 days a week.  Patient reports that she is doing a lot of sweeping and mopping.  Having some pain in her left arm as well as left side and chest.  Worse with movement or use of her left arm.    Coronary artery disease with history of MI- denies any shortness of breath.  Blood pressure is mildly elevated today.  Patient is taking metoprolol 12.5 mg at night and lisinopril 5 mg daily.  She does report that she has been stressed lately due to her partners illness and recent surgery. She has not been for a recent cardiology visit.  She does take her Plavix, aspirin and Crestor daily. Taking lasix .     Vertigo with ear pain- some shooting pain through her  left ear to her jaw, comes and goes. Ear feels full.       ROS and History reviewed as accurate per provider      The following portions of the patient's history and ROS were reviewed and updated as appropriate per provider:  Allergies, current medications, past family history, past medical history, past social history, past surgical history and problem list.    Review of Systems   Constitutional: Positive for activity change (increased) and fatigue. Negative for appetite change, chills, diaphoresis, fever and unexpected weight change.   HENT: Positive for ear discharge and ear pain. Negative for congestion, nosebleeds, postnasal drip, rhinorrhea, sore throat, trouble swallowing and voice change.    Eyes: Negative for pain and visual disturbance.   Respiratory: Positive for chest tightness. Negative for cough, choking, shortness of breath,  "wheezing and stridor.    Cardiovascular: Negative for chest pain, palpitations and leg swelling.   Gastrointestinal: Negative for abdominal pain, blood in stool, constipation, diarrhea, nausea and vomiting.   Endocrine: Negative for cold intolerance and polydipsia.   Genitourinary: Negative for difficulty urinating, dysuria, flank pain, frequency, hematuria and urgency.   Musculoskeletal: Negative for arthralgias, back pain, gait problem, joint swelling and myalgias.   Skin: Negative for color change and rash.   Allergic/Immunologic: Positive for environmental allergies.   Neurological: Positive for dizziness. Negative for tremors, seizures, syncope, facial asymmetry, speech difficulty, weakness, light-headedness, numbness and headaches.   Hematological: Negative.  Negative for adenopathy. Does not bruise/bleed easily.   Psychiatric/Behavioral: Negative for behavioral problems, dysphoric mood, self-injury, sleep disturbance and suicidal ideas. The patient is nervous/anxious.    All other systems reviewed and are negative.      Objective     /90   Pulse 72   Temp 99.5 °F (37.5 °C) (Tympanic)   Ht 157.5 cm (62\")   Wt 75.8 kg (167 lb)   SpO2 98%   BMI 30.54 kg/m²   Lab on 02/26/2019   Component Date Value Ref Range Status   • Glucose 02/26/2019 101  70 - 110 mg/dL Final   • BUN 02/26/2019 13  7 - 21 mg/dL Final   • Creatinine 02/26/2019 0.91  0.43 - 1.29 mg/dL Final   • Sodium 02/26/2019 139  135 - 153 mmol/L Final   • Potassium 02/26/2019 3.9  3.5 - 5.3 mmol/L Final   • Chloride 02/26/2019 104  99 - 112 mmol/L Final   • CO2 02/26/2019 27.2  24.3 - 31.9 mmol/L Final   • Calcium 02/26/2019 9.4  7.7 - 10.0 mg/dL Final   • Total Protein 02/26/2019 7.2  6.0 - 8.0 g/dL Final   • Albumin 02/26/2019 4.40  3.50 - 5.00 g/dL Final   • ALT (SGPT) 02/26/2019 15  10 - 36 U/L Final   • AST (SGOT) 02/26/2019 18  10 - 30 U/L Final   • Alkaline Phosphatase 02/26/2019 49  35 - 104 U/L Final   • Total Bilirubin 02/26/2019 0.3 "  0.2 - 1.8 mg/dL Final   • eGFR Non  Amer 02/26/2019 67  >60 mL/min/1.73 Final   • Globulin 02/26/2019 2.8  gm/dL Final   • A/G Ratio 02/26/2019 1.6  1.5 - 2.5 g/dL Final   • BUN/Creatinine Ratio 02/26/2019 14.3  7.0 - 25.0 Final   • Anion Gap 02/26/2019 7.8  3.6 - 11.2 mmol/L Final   • TSH 02/26/2019 0.667  0.550 - 4.780 mIU/mL Final   • Hemoglobin A1C 02/26/2019 5.80* 4.50 - 5.70 % Final   • 25 Hydroxy, Vitamin D 02/26/2019 20.0  ng/ml Final   • Total Cholesterol 02/26/2019 245* 0 - 200 mg/dL Final   • Triglycerides 02/26/2019 345* 0 - 150 mg/dL Final   • HDL Cholesterol 02/26/2019 44* 60 - 100 mg/dL Final   • LDL Cholesterol  02/26/2019 132* 0 - 100 mg/dL Final   • VLDL Cholesterol 02/26/2019 69  mg/dL Final   • LDL/HDL Ratio 02/26/2019 3.00   Final   • Uric Acid 02/26/2019 5.7  2.4 - 5.7 mg/dL Final   • WBC 02/26/2019 7.83  4.50 - 12.50 10*3/mm3 Final   • RBC 02/26/2019 4.30  4.20 - 5.40 10*6/mm3 Final   • Hemoglobin 02/26/2019 14.0  12.0 - 16.0 g/dL Final   • Hematocrit 02/26/2019 39.6  37.0 - 47.0 % Final   • MCV 02/26/2019 92.1  80.0 - 94.0 fL Final   • MCH 02/26/2019 32.6  27.0 - 33.0 pg Final   • MCHC 02/26/2019 35.4  33.0 - 37.0 g/dL Final   • RDW 02/26/2019 12.6  11.5 - 14.5 % Final   • RDW-SD 02/26/2019 40.9  37.0 - 54.0 fl Final   • MPV 02/26/2019 11.7* 6.0 - 10.0 fL Final   • Platelets 02/26/2019 255  130 - 400 10*3/mm3 Final   • Neutrophil % 02/26/2019 66.8  30.0 - 70.0 % Final   • Lymphocyte % 02/26/2019 23.9  21.0 - 51.0 % Final   • Monocyte % 02/26/2019 7.0  0.0 - 10.0 % Final   • Eosinophil % 02/26/2019 1.4  0.0 - 5.0 % Final   • Basophil % 02/26/2019 0.6  0.0 - 2.0 % Final   • Immature Grans % 02/26/2019 0.3  0.0 - 0.5 % Final   • Neutrophils, Absolute 02/26/2019 5.23  1.40 - 6.50 10*3/mm3 Final   • Lymphocytes, Absolute 02/26/2019 1.87  1.00 - 3.00 10*3/mm3 Final   • Monocytes, Absolute 02/26/2019 0.55  0.10 - 0.90 10*3/mm3 Final   • Eosinophils, Absolute 02/26/2019 0.11  0.00 - 0.70  10*3/mm3 Final   • Basophils, Absolute 02/26/2019 0.05  0.00 - 0.30 10*3/mm3 Final   • Immature Grans, Absolute 02/26/2019 0.02  0.00 - 0.03 10*3/mm3 Final   • Osmolality Calc 02/26/2019 277.8  273.0 - 305.0 mOsm/kg Final       Physical Exam   Constitutional: She is oriented to person, place, and time. Vital signs are normal. She appears well-developed and well-nourished. She does not have a sickly appearance. No distress.   talkative 44-year-old female.    HENT:   Head: Normocephalic.   Right Ear: Hearing, tympanic membrane and external ear normal. There is drainage.   Left Ear: Hearing, tympanic membrane and external ear normal. There is drainage.   Nose: Nose normal.   Mouth/Throat: Oropharynx is clear and moist. No oropharyngeal exudate.   Bilateral ear canals impacted with cerumen, removed via provider with flexi-loop. Honey colored cerumen removed, tolerated well.      Eyes: Conjunctivae, EOM and lids are normal. Pupils are equal, round, and reactive to light. Right eye exhibits no discharge. Left eye exhibits no discharge.   Neck: Normal range of motion. Neck supple. No tracheal deviation present. No thyromegaly present.   Cardiovascular: Normal rate, regular rhythm, normal heart sounds and normal pulses. Exam reveals no gallop and no friction rub.   No murmur heard.  Pulmonary/Chest: Effort normal and breath sounds normal. No respiratory distress. She has no decreased breath sounds. She has no wheezes. She has no rhonchi. She has no rales. She exhibits no tenderness.   Abdominal: Soft. Normal appearance and bowel sounds are normal. She exhibits no distension and no mass. There is no tenderness. There is no rebound and no guarding.   Musculoskeletal: Normal range of motion.   Lymphadenopathy:     She has no cervical adenopathy.   Neurological: She is alert and oriented to person, place, and time. She has normal reflexes.   CN 2-12 grossly intact    Skin: Skin is warm and dry. Capillary refill takes less than  2 seconds. No rash noted. She is not diaphoretic. No erythema.   Psychiatric: She has a normal mood and affect. Her speech is normal and behavior is normal. Judgment and thought content normal. Cognition and memory are normal.   Vitals reviewed.      Assessment/Plan     Problem List Items Addressed This Visit        Cardiovascular and Mediastinum    Coronary artery disease involving native coronary artery of native heart without angina pectoris    Relevant Medications    nitroglycerin (NITROSTAT) 0.3 MG SL tablet    aspirin  MG tablet    clopidogrel (PLAVIX) 75 MG tablet    Other Relevant Orders    Ambulatory Referral to Cardiology    CBC & Differential    Comprehensive Metabolic Panel    TSH    Hemoglobin A1c    Vitamin D 25 Hydroxy    Lipid Panel    Vitamin B12    CVD (cardiovascular disease)    Overview     History of MI 2016            Digestive    Vitamin D deficiency disease    Relevant Orders    CBC & Differential    Comprehensive Metabolic Panel    TSH    Hemoglobin A1c    Vitamin D 25 Hydroxy    Lipid Panel    Vitamin B12    Vitamin B 12 deficiency    Overview     Monthly injection per standing order         Relevant Orders    CBC & Differential    Comprehensive Metabolic Panel    TSH    Hemoglobin A1c    Vitamin D 25 Hydroxy    Lipid Panel    Vitamin B12       Nervous and Auditory    Excessive cerumen in both ear canals       Other    Anxiety disorder due to general medical condition with panic attack    Relevant Medications    buPROPion XL (WELLBUTRIN XL) 300 MG 24 hr tablet      Other Visit Diagnoses     Encounter for immunization    -  Primary    Relevant Orders    Flucelvax Quad=>4Years (4030-2406) (Completed)    Essential hypertension        Relevant Medications    lisinopril (PRINIVIL,ZESTRIL) 10 MG tablet    Vertigo        Relevant Medications    meclizine (ANTIVERT) 25 MG tablet          Flu vaccine today.  Avoid Q-tips due to excessive cerumen in ear canals.  Patient has been given an  appointment with cardiology.   Prescription for sublingual nitro provided/instructions provided on use.  Continue current medication.  Start meclizine for occasional vertigo.  Increase lisinopril from 5 mg daily up to 10 mg daily.  Patient will come in randomly over the next few weeks for blood pressure checks.  Low salt heart healthy diet recommended.    Pt has been instructed today regarding low fat heart smart diet. Weight management and routine exercise has been recommended. Avoid high fat foods, starchy foods and processed foods. Increase lean meats, fresh vegetables and fresh fruits.         Current Outpatient Medications:   •  aspirin  MG tablet, Take 1 tablet by mouth Daily., Disp: 30 tablet, Rfl: 5  •  buPROPion XL (WELLBUTRIN XL) 300 MG 24 hr tablet, Take 1 tablet by mouth Every Morning., Disp: 90 tablet, Rfl: 1  •  clopidogrel (PLAVIX) 75 MG tablet, Take 1 tablet by mouth Daily., Disp: 30 tablet, Rfl: 5  •  cyanocobalamin 1000 MCG/ML injection, Inject 1 mL into the shoulder, thigh, or buttocks Every 28 (Twenty-Eight) Days. Given at home, Disp: 1 mL, Rfl: 11  •  furosemide (LASIX) 20 MG tablet, Take 1 tablet by mouth Daily As Needed (swelling)., Disp: 30 tablet, Rfl: 5  •  lisinopril (PRINIVIL,ZESTRIL) 10 MG tablet, Take 1 tablet by mouth Daily., Disp: 30 tablet, Rfl: 5  •  loratadine (CLARITIN) 10 MG tablet, Take 1 tablet by mouth Daily As Needed for Allergies., Disp: 30 tablet, Rfl: 5  •  metoprolol succinate XL (TOPROL-XL) 25 MG 24 hr tablet, Take 0.5 tablets by mouth Daily., Disp: 30 tablet, Rfl: 11  •  omeprazole (priLOSEC) 40 MG capsule, Take 1 capsule by mouth Daily., Disp: 30 capsule, Rfl: 5  •  potassium chloride (K-DUR,KLOR-CON) 10 MEQ CR tablet, Take 1 tablet by mouth Daily. Take only when take lasix, Disp: 30 tablet, Rfl: 5  •  rosuvastatin (CRESTOR) 20 MG tablet, Take 1 tablet by mouth Daily., Disp: 30 tablet, Rfl: 11  •  meclizine (ANTIVERT) 25 MG tablet, Take 1 tablet by mouth 3  (Three) Times a Day As Needed for Dizziness., Disp: 60 tablet, Rfl: 5  •  nitroglycerin (NITROSTAT) 0.3 MG SL tablet, Place 1 tablet under the tongue Every 5 (Five) Minutes As Needed for Chest Pain. Take no more than 3 doses in 15 minutes., Disp: 30 tablet, Rfl: 12    Current Facility-Administered Medications:   •  cyanocobalamin injection 1,000 mcg, 1,000 mcg, Intramuscular, Q30 Days, Ashley Alejandre APRN, 1,000 mcg at 11/13/19 1633         Patient's Body mass index is 30.54 kg/m². BMI is above normal parameters. Recommendations include: exercise counseling and nutrition counseling.        I have discussed diagnosis in detail today allowing time for questions and answers. Patient is aware of reasons to seek urgent or emergent medical care as well as reasons to return to the clinic for evaluation. Possible side effects, interactions and progression of symptoms discussed as well. Patient / family states understanding.   Emotional support and active listening provided.     Fasting labs within the next 1-2 weeks, see me back in 2-4 weeks.  Sooner if needed.          This document has been electronically signed by:  ILEANA James, NP-C

## 2019-11-14 ENCOUNTER — TELEPHONE (OUTPATIENT)
Dept: FAMILY MEDICINE CLINIC | Facility: CLINIC | Age: 44
End: 2019-11-14

## 2019-11-14 NOTE — TELEPHONE ENCOUNTER
----- Message from ILEANA Angel sent at 11/13/2019  5:34 PM EST -----  When is her cardiology apt?       Nov 15 at 10:15

## 2019-11-15 ENCOUNTER — OFFICE VISIT (OUTPATIENT)
Dept: CARDIOLOGY | Facility: CLINIC | Age: 44
End: 2019-11-15

## 2019-11-15 VITALS
BODY MASS INDEX: 31.1 KG/M2 | DIASTOLIC BLOOD PRESSURE: 85 MMHG | SYSTOLIC BLOOD PRESSURE: 142 MMHG | HEIGHT: 62 IN | HEART RATE: 71 BPM | WEIGHT: 169 LBS | OXYGEN SATURATION: 97 %

## 2019-11-15 DIAGNOSIS — R09.89 BRUIT OF RIGHT CAROTID ARTERY: ICD-10-CM

## 2019-11-15 DIAGNOSIS — I10 ESSENTIAL HYPERTENSION: ICD-10-CM

## 2019-11-15 DIAGNOSIS — E78.2 MIXED HYPERLIPIDEMIA: ICD-10-CM

## 2019-11-15 DIAGNOSIS — I25.10 CORONARY ARTERY DISEASE INVOLVING NATIVE CORONARY ARTERY OF NATIVE HEART WITHOUT ANGINA PECTORIS: Primary | ICD-10-CM

## 2019-11-15 PROCEDURE — 99214 OFFICE O/P EST MOD 30 MIN: CPT | Performed by: NURSE PRACTITIONER

## 2019-11-15 NOTE — PROGRESS NOTES
"Subjective     Chief Complaint: Coronary Artery Disease and Hyperlipidemia    History of Present Illness   Ekaterina Verdin is a 44 y.o. female who presents with a past medical history significant for coronary artery disease, status post PCI ROSI to the RCA in September 2016, hypertension, dyslipidemia, and overweight.  She presents today at the request of her primary care provider due to elevated blood pressure and report of tingling/numbness around and in her right ear.    The patient tells me that she was seen by her PCP on 11/13.  At that time her PCP increased her lisinopril from 5 mg daily to 10 mg.  She has not started the new prescription as of today.  Pt reports pain on left side of chest, occurring 3-4 times weekly.  Occurs at rest.  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  She describes the pain on the left side of her breast as being an aching sensation that is sharp.  Last only a short while and resolves on its own.  She also reports that her left arm aches and clarifies that it has \"ached\" for years.      She also reports that she has had some pain and tingling with loss of sensation in her right ear and jaw.      Current Outpatient Medications:   •  buPROPion XL (WELLBUTRIN XL) 300 MG 24 hr tablet, Take 1 tablet by mouth Every Morning., Disp: 90 tablet, Rfl: 1  •  clopidogrel (PLAVIX) 75 MG tablet, Take 1 tablet by mouth Daily., Disp: 30 tablet, Rfl: 5  •  cyanocobalamin 1000 MCG/ML injection, Inject 1 mL into the shoulder, thigh, or buttocks Every 28 (Twenty-Eight) Days. Given at home, Disp: 1 mL, Rfl: 11  •  furosemide (LASIX) 20 MG tablet, Take 1 tablet by mouth Daily As Needed (swelling)., Disp: 30 tablet, Rfl: 5  •  lisinopril (PRINIVIL,ZESTRIL) 10 MG tablet, Take 1 tablet by mouth Daily., Disp: 30 tablet, Rfl: 5  •  loratadine (CLARITIN) 10 MG tablet, Take 1 tablet by mouth Daily As Needed for Allergies., Disp: 30 tablet, Rfl: 5  •  meclizine " "(ANTIVERT) 25 MG tablet, Take 1 tablet by mouth 3 (Three) Times a Day As Needed for Dizziness., Disp: 60 tablet, Rfl: 5  •  metoprolol succinate XL (TOPROL-XL) 25 MG 24 hr tablet, Take 0.5 tablets by mouth Daily., Disp: 30 tablet, Rfl: 11  •  nitroglycerin (NITROSTAT) 0.3 MG SL tablet, Place 1 tablet under the tongue Every 5 (Five) Minutes As Needed for Chest Pain. Take no more than 3 doses in 15 minutes., Disp: 30 tablet, Rfl: 12  •  omeprazole (priLOSEC) 40 MG capsule, Take 1 capsule by mouth Daily., Disp: 30 capsule, Rfl: 5  •  potassium chloride (K-DUR,KLOR-CON) 10 MEQ CR tablet, Take 1 tablet by mouth Daily. Take only when take lasix, Disp: 30 tablet, Rfl: 5  •  rosuvastatin (CRESTOR) 20 MG tablet, Take 1 tablet by mouth Daily., Disp: 30 tablet, Rfl: 11  •  aspirin 81 MG tablet, Take 1 tablet by mouth Daily., Disp: 30 tablet, Rfl: 11    Current Facility-Administered Medications:   •  cyanocobalamin injection 1,000 mcg, 1,000 mcg, Intramuscular, Q30 Days, Ashley Alejandre APRN, 1,000 mcg at 11/13/19 1633     On this date:  The following portions of the patient's history were reviewed and updated as appropriate: allergies, current medications, past family history, past medical history, past social history, past surgical history and problem list.    Review of Systems   Constitution: Positive for malaise/fatigue. Negative for decreased appetite, weakness, weight gain and weight loss.   Cardiovascular: Positive for chest pain. Negative for dyspnea on exertion, irregular heartbeat, leg swelling, near-syncope, palpitations, paroxysmal nocturnal dyspnea and syncope.   Respiratory: Negative for cough and shortness of breath.    Hematologic/Lymphatic: Bruises/bleeds easily.   Neurological: Positive for dizziness and light-headedness.         Objective     /85 (BP Location: Left arm, Patient Position: Sitting, Cuff Size: Adult)   Pulse 71   Ht 157.5 cm (62\")   Wt 76.7 kg (169 lb)   SpO2 97%   BMI " 30.91 kg/m²     Physical Exam   Constitutional: She appears well-developed and well-nourished.   HENT:   Head: Normocephalic and atraumatic.   Eyes: Pupils are equal, round, and reactive to light.   Neck: No JVD present. Carotid bruit is present (light right).   Cardiovascular: Normal rate, regular rhythm and intact distal pulses. Exam reveals no gallop and no friction rub.   No murmur heard.  Pulmonary/Chest: Effort normal and breath sounds normal. No respiratory distress. She has no wheezes. She has no rales.   Abdominal: Soft. She exhibits no mass. There is no tenderness. No hernia.   Skin: Skin is warm and dry.   Psychiatric: She has a normal mood and affect.   Vitals reviewed.      Procedures      Assessment/Plan       Ekaterina was seen today for coronary artery disease and hyperlipidemia.    Diagnoses and all orders for this visit:    Assessment:  1. Chest pain, atypical  2. Fatigue  3. Right carotid artery bruit  4. Coronary artery disease  5. Dyslipidemia  6. Essential hypertension      Plan:  1. Ms Verdin's chest pain does appear to be atypical.  Primarily, it is not associated with exertion, shortness of breath or diaphoresis.  It is likely musculoskeletal in origin.  2. Fatigue may be secondary to decrease in cardiac function, therefore a echocardiogram has been ordered.  The patient's primary care provider is, according to the patient, planning on a blood work.  3. Patient does have a mild right carotid bruit.  Given that she has a history of premature coronary artery disease,  I have ordered a carotid ultrasound for evaluation.    4. With regard to the patient's uncontrolled hypertension, her PCP has increased her lisinopril and the patient has not yet started the new medication.  Therefore, at this time I will not add anything to her medication regimen.  5. Pt to continue guideline directed medical therapy for CAD: Aspirin, Plavix, metoprolol succinate, lisinopril, and rosuvastatin.  I have instructed the  patient that she may decrease her aspirin to 81 mg daily.  6. Risk factor modification: Patient counseled regarding diet and exercise.  Recommended Mediterranean diet and increase of activity to 30 minutes of walking daily for most days of the week.    7. Return to clinic in one month, sooner for any worsening or concerning symptoms.      Return in about 4 weeks (around 12/13/2019).      ILEANA Jansen

## 2019-11-25 ENCOUNTER — HOSPITAL ENCOUNTER (OUTPATIENT)
Dept: CARDIOLOGY | Facility: HOSPITAL | Age: 44
Discharge: HOME | End: 2019-11-25

## 2019-11-25 ENCOUNTER — HOSPITAL ENCOUNTER (OUTPATIENT)
Dept: CARDIOLOGY | Facility: HOSPITAL | Age: 44
Discharge: HOME | End: 2019-11-25
Admitting: NURSE PRACTITIONER

## 2019-11-25 DIAGNOSIS — I25.10 CORONARY ARTERY DISEASE INVOLVING NATIVE CORONARY ARTERY OF NATIVE HEART WITHOUT ANGINA PECTORIS: ICD-10-CM

## 2019-11-25 DIAGNOSIS — R09.89 BRUIT OF RIGHT CAROTID ARTERY: ICD-10-CM

## 2019-11-25 PROCEDURE — 93306 TTE W/DOPPLER COMPLETE: CPT | Performed by: INTERNAL MEDICINE

## 2019-11-25 PROCEDURE — 93306 TTE W/DOPPLER COMPLETE: CPT

## 2019-11-25 PROCEDURE — 93880 EXTRACRANIAL BILAT STUDY: CPT

## 2019-11-25 PROCEDURE — 93880 EXTRACRANIAL BILAT STUDY: CPT | Performed by: RADIOLOGY

## 2019-11-26 LAB
BH CV ECHO MEAS - % IVS THICK: -3.7 %
BH CV ECHO MEAS - % LVPW THICK: 9 %
BH CV ECHO MEAS - ACS: 1.4 CM
BH CV ECHO MEAS - AO MAX PG: 17.1 MMHG
BH CV ECHO MEAS - AO MEAN PG: 7 MMHG
BH CV ECHO MEAS - AO ROOT AREA (BSA CORRECTED): 1.5
BH CV ECHO MEAS - AO ROOT AREA: 5.9 CM^2
BH CV ECHO MEAS - AO ROOT DIAM: 2.8 CM
BH CV ECHO MEAS - AO V2 MAX: 207 CM/SEC
BH CV ECHO MEAS - AO V2 MEAN: 121 CM/SEC
BH CV ECHO MEAS - AO V2 VTI: 41 CM
BH CV ECHO MEAS - BSA(HAYCOCK): 1.9 M^2
BH CV ECHO MEAS - BSA: 1.8 M^2
BH CV ECHO MEAS - BZI_BMI: 30.9 KILOGRAMS/M^2
BH CV ECHO MEAS - BZI_METRIC_HEIGHT: 157.5 CM
BH CV ECHO MEAS - BZI_METRIC_WEIGHT: 76.7 KG
BH CV ECHO MEAS - EDV(CUBED): 81.2 ML
BH CV ECHO MEAS - EDV(MOD-SP4): 63 ML
BH CV ECHO MEAS - EDV(TEICH): 84.4 ML
BH CV ECHO MEAS - EF(CUBED): 67.7 %
BH CV ECHO MEAS - EF(MOD-SP4): 63.5 %
BH CV ECHO MEAS - EF(TEICH): 59.6 %
BH CV ECHO MEAS - ESV(CUBED): 26.2 ML
BH CV ECHO MEAS - ESV(MOD-SP4): 23 ML
BH CV ECHO MEAS - ESV(TEICH): 34.2 ML
BH CV ECHO MEAS - FS: 31.4 %
BH CV ECHO MEAS - IVS/LVPW: 1
BH CV ECHO MEAS - IVSD: 1 CM
BH CV ECHO MEAS - IVSS: 1 CM
BH CV ECHO MEAS - LA DIMENSION: 3.1 CM
BH CV ECHO MEAS - LA/AO: 1.1
BH CV ECHO MEAS - LV DIASTOLIC VOL/BSA (35-75): 35.4 ML/M^2
BH CV ECHO MEAS - LV MASS(C)D: 149.8 GRAMS
BH CV ECHO MEAS - LV MASS(C)DI: 84.2 GRAMS/M^2
BH CV ECHO MEAS - LV MASS(C)S: 87.9 GRAMS
BH CV ECHO MEAS - LV MASS(C)SI: 49.4 GRAMS/M^2
BH CV ECHO MEAS - LV SYSTOLIC VOL/BSA (12-30): 12.9 ML/M^2
BH CV ECHO MEAS - LVIDD: 4.3 CM
BH CV ECHO MEAS - LVIDS: 3 CM
BH CV ECHO MEAS - LVLD AP4: 7.7 CM
BH CV ECHO MEAS - LVLS AP4: 5.5 CM
BH CV ECHO MEAS - LVOT AREA (M): 2.3 CM^2
BH CV ECHO MEAS - LVOT AREA: 2.3 CM^2
BH CV ECHO MEAS - LVOT DIAM: 1.7 CM
BH CV ECHO MEAS - LVPWD: 1 CM
BH CV ECHO MEAS - LVPWS: 1.1 CM
BH CV ECHO MEAS - MV A MAX VEL: 67.1 CM/SEC
BH CV ECHO MEAS - MV E MAX VEL: 95.3 CM/SEC
BH CV ECHO MEAS - MV E/A: 1.4
BH CV ECHO MEAS - PA ACC SLOPE: 1583 CM/SEC^2
BH CV ECHO MEAS - PA ACC TIME: 0.1 SEC
BH CV ECHO MEAS - PA PR(ACCEL): 34.5 MMHG
BH CV ECHO MEAS - RAP SYSTOLE: 10 MMHG
BH CV ECHO MEAS - RVSP: 21.7 MMHG
BH CV ECHO MEAS - SI(AO): 136.9 ML/M^2
BH CV ECHO MEAS - SI(CUBED): 30.9 ML/M^2
BH CV ECHO MEAS - SI(MOD-SP4): 22.5 ML/M^2
BH CV ECHO MEAS - SI(TEICH): 28.3 ML/M^2
BH CV ECHO MEAS - SV(AO): 243.5 ML
BH CV ECHO MEAS - SV(CUBED): 55 ML
BH CV ECHO MEAS - SV(MOD-SP4): 40 ML
BH CV ECHO MEAS - SV(TEICH): 50.3 ML
BH CV ECHO MEAS - TR MAX VEL: 171 CM/SEC
LV EF 2D ECHO EST: 65 %
MAXIMAL PREDICTED HEART RATE: 174 BPM
STRESS TARGET HR: 148 BPM

## 2019-11-27 ENCOUNTER — TELEPHONE (OUTPATIENT)
Dept: CARDIOLOGY | Facility: CLINIC | Age: 44
End: 2019-11-27

## 2019-11-27 NOTE — TELEPHONE ENCOUNTER
----- Message from ILEANA Lee sent at 11/26/2019  3:04 PM EST -----  Please call patient.  Normal results.   Also has echo report to be given    Thanks, Sigrid

## 2019-12-12 ENCOUNTER — OFFICE VISIT (OUTPATIENT)
Dept: FAMILY MEDICINE CLINIC | Facility: CLINIC | Age: 44
End: 2019-12-12

## 2019-12-12 VITALS
HEART RATE: 75 BPM | BODY MASS INDEX: 31.1 KG/M2 | DIASTOLIC BLOOD PRESSURE: 80 MMHG | TEMPERATURE: 98 F | WEIGHT: 169 LBS | OXYGEN SATURATION: 98 % | HEIGHT: 62 IN | SYSTOLIC BLOOD PRESSURE: 140 MMHG

## 2019-12-12 DIAGNOSIS — M25.512 ACUTE PAIN OF LEFT SHOULDER: Primary | ICD-10-CM

## 2019-12-12 DIAGNOSIS — E66.9 OBESITY (BMI 30-39.9): ICD-10-CM

## 2019-12-12 DIAGNOSIS — M79.645 PAIN IN FINGER OF LEFT HAND: ICD-10-CM

## 2019-12-12 PROCEDURE — 99214 OFFICE O/P EST MOD 30 MIN: CPT | Performed by: NURSE PRACTITIONER

## 2019-12-12 PROCEDURE — 96372 THER/PROPH/DIAG INJ SC/IM: CPT | Performed by: NURSE PRACTITIONER

## 2019-12-12 RX ORDER — KETOROLAC TROMETHAMINE 30 MG/ML
60 INJECTION, SOLUTION INTRAMUSCULAR; INTRAVENOUS ONCE
Status: COMPLETED | OUTPATIENT
Start: 2019-12-12 | End: 2019-12-12

## 2019-12-12 RX ORDER — METHYLPREDNISOLONE ACETATE 80 MG/ML
80 INJECTION, SUSPENSION INTRA-ARTICULAR; INTRALESIONAL; INTRAMUSCULAR; SOFT TISSUE ONCE
Status: COMPLETED | OUTPATIENT
Start: 2019-12-12 | End: 2019-12-12

## 2019-12-12 RX ORDER — ACETAMINOPHEN 325 MG/1
325 TABLET ORAL EVERY 6 HOURS PRN
Qty: 30 TABLET | Refills: 0 | Status: SHIPPED | OUTPATIENT
Start: 2019-12-12 | End: 2020-06-03 | Stop reason: SDUPTHER

## 2019-12-12 RX ADMIN — METHYLPREDNISOLONE ACETATE 80 MG: 80 INJECTION, SUSPENSION INTRA-ARTICULAR; INTRALESIONAL; INTRAMUSCULAR; SOFT TISSUE at 12:10

## 2019-12-12 RX ADMIN — KETOROLAC TROMETHAMINE 60 MG: 30 INJECTION, SOLUTION INTRAMUSCULAR; INTRAVENOUS at 12:09

## 2019-12-12 NOTE — ASSESSMENT & PLAN NOTE
Obesity is worsening.  Discussed the patient's BMI.  The BMI is above average; BMI management plan is completed.  General weight loss/lifestyle modification strategies discussed (elicit support from others; identify saboteurs; non-food rewards, etc).

## 2019-12-12 NOTE — PROGRESS NOTES
Subjective   Ekaterina Verdin is a 44 y.o. female.     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 It was dark and there was ice on the parking lot. She was parked on a slope. Lost her footing and slipped. Bent her left arm back to catch herself.  Patient is having difficulty elevating her left shoulder.  She has pain with any movement of her left arm.    The following portions of the patient's history, chief complaint and ROS were reviewed and updated as appropriate per provider:  Allergies, current medications, past family history, past medical history, past social history, past surgical history and problem list.      Review of Systems   Constitutional: Positive for activity change. Negative for appetite change, fatigue and unexpected weight change.   HENT: Negative for congestion, ear pain, nosebleeds, postnasal drip, rhinorrhea, sore throat, trouble swallowing and voice change.    Eyes: Negative for pain and visual disturbance.   Respiratory: Negative for cough, shortness of breath and wheezing.    Cardiovascular: Negative for chest pain and palpitations.   Gastrointestinal: Negative for abdominal pain, blood in stool, constipation and diarrhea.   Endocrine: Negative for cold intolerance and polydipsia.   Genitourinary: Negative for difficulty urinating, flank pain and hematuria.   Musculoskeletal: Positive for arthralgias. Negative for back pain, gait problem, joint swelling, myalgias, neck pain and neck stiffness.   Skin: Negative for color change and rash.   Allergic/Immunologic: Positive for environmental allergies.   Neurological: Negative for syncope, numbness and headaches.   Hematological: Negative.    Psychiatric/Behavioral: Negative for sleep disturbance and suicidal ideas.   All other systems reviewed and are negative.      Objective     /80   Pulse 75   Temp 98 °F (36.7 °C)  "(Tympanic)   Ht 157.5 cm (62\")   Wt 76.7 kg (169 lb)   SpO2 98%   BMI 30.91 kg/m²   Hospital Outpatient Visit on 11/25/2019   Component Date Value Ref Range Status   • BSA 11/25/2019 1.8  m^2 Final   • IVSd 11/25/2019 1.0  cm Final   • IVSs 11/25/2019 1.0  cm Final   • LVIDd 11/25/2019 4.3  cm Final   • LVIDs 11/25/2019 3.0  cm Final   • LVPWd 11/25/2019 1.0  cm Final   • BH CV ECHO MACHO - LVPWS 11/25/2019 1.1  cm Final   • IVS/LVPW 11/25/2019 1.0   Final   • FS 11/25/2019 31.4  % Final   • EDV(Teich) 11/25/2019 84.4  ml Final   • ESV(Teich) 11/25/2019 34.2  ml Final   • EF(Teich) 11/25/2019 59.6  % Final   • EDV(cubed) 11/25/2019 81.2  ml Final   • ESV(cubed) 11/25/2019 26.2  ml Final   • EF(cubed) 11/25/2019 67.7  % Final   • % IVS thick 11/25/2019 -3.7  % Final   • % LVPW thick 11/25/2019 9.0  % Final   • LV mass(C)d 11/25/2019 149.8  grams Final   • LV mass(C)dI 11/25/2019 84.2  grams/m^2 Final   • LV mass(C)s 11/25/2019 87.9  grams Final   • LV mass(C)sI 11/25/2019 49.4  grams/m^2 Final   • SV(Teich) 11/25/2019 50.3  ml Final   • SI(Teich) 11/25/2019 28.3  ml/m^2 Final   • SV(cubed) 11/25/2019 55.0  ml Final   • SI(cubed) 11/25/2019 30.9  ml/m^2 Final   • Ao root diam 11/25/2019 2.8  cm Final   • Ao root area 11/25/2019 5.9  cm^2 Final   • ACS 11/25/2019 1.4  cm Final   • LA dimension 11/25/2019 3.1  cm Final   • LA/Ao 11/25/2019 1.1   Final   • LVOT diam 11/25/2019 1.7  cm Final   • LVOT area 11/25/2019 2.3  cm^2 Final   • LVOT area(traced) 11/25/2019 2.3  cm^2 Final   • LVLd ap4 11/25/2019 7.7  cm Final   • EDV(MOD-sp4) 11/25/2019 63.0  ml Final   • LVLs ap4 11/25/2019 5.5  cm Final   • ESV(MOD-sp4) 11/25/2019 23.0  ml Final   • EF(MOD-sp4) 11/25/2019 63.5  % Final   • SV(MOD-sp4) 11/25/2019 40.0  ml Final   • SI(MOD-sp4) 11/25/2019 22.5  ml/m^2 Final   • Ao root area (BSA corrected) 11/25/2019 1.5   Final   • LV Mares Vol (BSA corrected) 11/25/2019 35.4  ml/m^2 Final   • LV Sys Vol (BSA corrected) " 11/25/2019 12.9  ml/m^2 Final   • MV E max shaila 11/25/2019 95.3  cm/sec Final   • MV A max shaila 11/25/2019 67.1  cm/sec Final   • MV E/A 11/25/2019 1.4   Final   • Ao pk shaila 11/25/2019 207.0  cm/sec Final   • Ao max PG 11/25/2019 17.1  mmHg Final   • Ao V2 mean 11/25/2019 121.0  cm/sec Final   • Ao mean PG 11/25/2019 7.0  mmHg Final   • Ao V2 VTI 11/25/2019 41.0  cm Final   • SV(Ao) 11/25/2019 243.5  ml Final   • SI(Ao) 11/25/2019 136.9  ml/m^2 Final   • PA acc slope 11/25/2019 1,583  cm/sec^2 Final   • PA acc time 11/25/2019 0.1  sec Final   • TR max shaila 11/25/2019 171.0  cm/sec Final   • RVSP(TR) 11/25/2019 21.7  mmHg Final   • RAP systole 11/25/2019 10.0  mmHg Final   • PA pr(Accel) 11/25/2019 34.5  mmHg Final   • BH CV ECHO MACHO - BZI_BMI 11/25/2019 30.9  kilograms/m^2 Final   • BH CV ECHO MACHO - BSA(H[place]OCK) 11/25/2019 1.9  m^2 Final   • BH CV ECHO MACHO - BZI_METRIC_WEIGHT 11/25/2019 76.7  kg Final   • BH CV ECHO MACHO - BZI_METRIC_HEIGHT 11/25/2019 157.5  cm Final   • Target HR (85%) 11/25/2019 148  bpm Final   • Max. Pred. HR (100%) 11/25/2019 174  bpm Final   • Echo EF Estimated 11/25/2019 65  % Final       Physical Exam   Constitutional: She is oriented to person, place, and time. Vital signs are normal. She appears well-developed and well-nourished. No distress.   HENT:   Head: Normocephalic.   Right Ear: External ear normal.   Left Ear: External ear normal.   Nose: Nose normal.   Mouth/Throat: Oropharynx is clear and moist. No oropharyngeal exudate.   Eyes: Pupils are equal, round, and reactive to light. Conjunctivae, EOM and lids are normal. Right eye exhibits no discharge. Left eye exhibits no discharge.   Neck: Normal range of motion. Neck supple. No tracheal deviation present. No thyromegaly present.   Cardiovascular: Normal rate, regular rhythm and normal heart sounds. Exam reveals no gallop and no friction rub.   No murmur heard.  Pulmonary/Chest: Effort normal and breath sounds normal. No respiratory  distress. She has no wheezes. She has no rales. She exhibits no tenderness.   Abdominal: Soft. Normal appearance and bowel sounds are normal. She exhibits no distension and no mass. There is no tenderness. There is no rebound and no guarding.   Musculoskeletal:        Left shoulder: She exhibits decreased range of motion, tenderness, swelling, pain and spasm. She exhibits normal pulse and normal strength.        Arms:  Lymphadenopathy:     She has no cervical adenopathy.   Neurological: She is alert and oriented to person, place, and time. She has normal reflexes.   CN 2-12 grossly intact    Skin: Skin is warm and dry. Capillary refill takes less than 2 seconds. No rash noted. She is not diaphoretic. No erythema.   Psychiatric: She has a normal mood and affect. Her speech is normal and behavior is normal. Judgment and thought content normal. Cognition and memory are normal.   Vitals reviewed.      Assessment/Plan     Problem List Items Addressed This Visit        Digestive    Obesity (BMI 30-39.9)    Current Assessment & Plan     Obesity is worsening.  Discussed the patient's BMI.  The BMI is above average; BMI management plan is completed.  General weight loss/lifestyle modification strategies discussed (elicit support from others; identify saboteurs; non-food rewards, etc).           Other Visit Diagnoses     Acute pain of left shoulder    -  Primary    Relevant Medications    ketorolac (TORADOL) injection 60 mg    methylPREDNISolone acetate (DEPO-medrol) injection 80 mg    acetaminophen (TYLENOL) 325 MG tablet    diclofenac (VOLTAREN) 1 % gel gel    Other Relevant Orders    XR Shoulder 2+ View Left    Ambulatory Referral to Physical Therapy Evaluate and treat (Completed)          Proper body mechanics has been reviewed and discussed today.        Conservative measures discussed.  Apply ice 3-4 times a day for 5 minutes.  Tylenol for pain due to anticoagulation treatment.  Will provide patient with some topical  diclofenac which she may massage and up to 4 times a day as needed.  Physical therapy referral provided for [facility].  Order for left arm sling.  Advised her to avoid painful movements.  Patient will go to [facility] today for outpatient x-ray of the left upper extremity/shoulder.  Discussed changes which she would need to report or seek immediate medical attention for immediately.  Understanding stated.       Patient's Body mass index is 30.91 kg/m². BMI is above normal parameters. Recommendations include: exercise counseling and nutrition counseling.            I have discussed diagnosis in detail today allowing time for questions and answers. Patient is aware of reasons to seek urgent or emergent medical care as well as reasons to return to the clinic for evaluation. Possible side effects, interactions and progression of symptoms discussed as well. Patient / family states understanding.   Emotional support and active listening provided.       Follow-up in 1 week, sooner if needed.    Dictated utilizing Dragon dictation. Errors in dictation may reflect use of voice recognition software and not all errors in transcription may have been detected prior to signing.           This document has been electronically signed by:  ILEANA James, NP-C

## 2019-12-13 ENCOUNTER — LAB (OUTPATIENT)
Dept: FAMILY MEDICINE CLINIC | Facility: CLINIC | Age: 44
End: 2019-12-13

## 2019-12-13 DIAGNOSIS — E55.9 VITAMIN D DEFICIENCY DISEASE: ICD-10-CM

## 2019-12-13 DIAGNOSIS — E53.8 VITAMIN B 12 DEFICIENCY: ICD-10-CM

## 2019-12-13 DIAGNOSIS — I25.10 CORONARY ARTERY DISEASE INVOLVING NATIVE CORONARY ARTERY OF NATIVE HEART WITHOUT ANGINA PECTORIS: ICD-10-CM

## 2019-12-13 LAB
25(OH)D3 SERPL-MCNC: 24.1 NG/ML (ref 30–100)
ALBUMIN SERPL-MCNC: 4.4 G/DL (ref 3.5–5.2)
ALBUMIN/GLOB SERPL: 1.4 G/DL
ALP SERPL-CCNC: 47 U/L (ref 39–117)
ALT SERPL W P-5'-P-CCNC: 13 U/L (ref 1–33)
ANION GAP SERPL CALCULATED.3IONS-SCNC: 11.3 MMOL/L (ref 5–15)
AST SERPL-CCNC: 12 U/L (ref 1–32)
BASOPHILS # BLD AUTO: 0.04 10*3/MM3 (ref 0–0.2)
BASOPHILS NFR BLD AUTO: 0.4 % (ref 0–1.5)
BILIRUB SERPL-MCNC: 0.2 MG/DL (ref 0.2–1.2)
BUN BLD-MCNC: 10 MG/DL (ref 6–20)
BUN/CREAT SERPL: 11 (ref 7–25)
CALCIUM SPEC-SCNC: 9.4 MG/DL (ref 8.6–10.5)
CHLORIDE SERPL-SCNC: 103 MMOL/L (ref 98–107)
CHOLEST SERPL-MCNC: 120 MG/DL (ref 0–200)
CO2 SERPL-SCNC: 25.7 MMOL/L (ref 22–29)
CREAT BLD-MCNC: 0.91 MG/DL (ref 0.57–1)
DEPRECATED RDW RBC AUTO: 40.5 FL (ref 37–54)
EOSINOPHIL # BLD AUTO: 0.02 10*3/MM3 (ref 0–0.4)
EOSINOPHIL NFR BLD AUTO: 0.2 % (ref 0.3–6.2)
ERYTHROCYTE [DISTWIDTH] IN BLOOD BY AUTOMATED COUNT: 12.3 % (ref 12.3–15.4)
GFR SERPL CREATININE-BSD FRML MDRD: 67 ML/MIN/1.73
GLOBULIN UR ELPH-MCNC: 3.1 GM/DL
GLUCOSE BLD-MCNC: 92 MG/DL (ref 65–99)
HBA1C MFR BLD: 5.76 % (ref 4.8–5.6)
HCT VFR BLD AUTO: 36.6 % (ref 34–46.6)
HDLC SERPL-MCNC: 43 MG/DL (ref 40–60)
HGB BLD-MCNC: 12.6 G/DL (ref 12–15.9)
IMM GRANULOCYTES # BLD AUTO: 0.04 10*3/MM3 (ref 0–0.05)
IMM GRANULOCYTES NFR BLD AUTO: 0.4 % (ref 0–0.5)
LDLC SERPL CALC-MCNC: 58 MG/DL (ref 0–100)
LDLC/HDLC SERPL: 1.34 {RATIO}
LYMPHOCYTES # BLD AUTO: 1.81 10*3/MM3 (ref 0.7–3.1)
LYMPHOCYTES NFR BLD AUTO: 19.3 % (ref 19.6–45.3)
MCH RBC QN AUTO: 31 PG (ref 26.6–33)
MCHC RBC AUTO-ENTMCNC: 34.4 G/DL (ref 31.5–35.7)
MCV RBC AUTO: 89.9 FL (ref 79–97)
MONOCYTES # BLD AUTO: 0.54 10*3/MM3 (ref 0.1–0.9)
MONOCYTES NFR BLD AUTO: 5.8 % (ref 5–12)
NEUTROPHILS # BLD AUTO: 6.93 10*3/MM3 (ref 1.7–7)
NEUTROPHILS NFR BLD AUTO: 73.9 % (ref 42.7–76)
NRBC BLD AUTO-RTO: 0 /100 WBC (ref 0–0.2)
PLATELET # BLD AUTO: 279 10*3/MM3 (ref 140–450)
PMV BLD AUTO: 11.7 FL (ref 6–12)
POTASSIUM BLD-SCNC: 4.3 MMOL/L (ref 3.5–5.2)
PROT SERPL-MCNC: 7.5 G/DL (ref 6–8.5)
RBC # BLD AUTO: 4.07 10*6/MM3 (ref 3.77–5.28)
SODIUM BLD-SCNC: 140 MMOL/L (ref 136–145)
TRIGL SERPL-MCNC: 96 MG/DL (ref 0–150)
TSH SERPL DL<=0.05 MIU/L-ACNC: 1.97 UIU/ML (ref 0.27–4.2)
VIT B12 BLD-MCNC: 408 PG/ML (ref 211–946)
VLDLC SERPL-MCNC: 19.2 MG/DL (ref 5–40)
WBC NRBC COR # BLD: 9.38 10*3/MM3 (ref 3.4–10.8)

## 2019-12-13 PROCEDURE — 80061 LIPID PANEL: CPT | Performed by: NURSE PRACTITIONER

## 2019-12-13 PROCEDURE — 83036 HEMOGLOBIN GLYCOSYLATED A1C: CPT | Performed by: NURSE PRACTITIONER

## 2019-12-13 PROCEDURE — 82607 VITAMIN B-12: CPT | Performed by: NURSE PRACTITIONER

## 2019-12-13 PROCEDURE — 80050 GENERAL HEALTH PANEL: CPT | Performed by: NURSE PRACTITIONER

## 2019-12-13 PROCEDURE — 82306 VITAMIN D 25 HYDROXY: CPT | Performed by: NURSE PRACTITIONER

## 2019-12-18 ENCOUNTER — TELEPHONE (OUTPATIENT)
Dept: FAMILY MEDICINE CLINIC | Facility: CLINIC | Age: 44
End: 2019-12-18

## 2019-12-18 RX ORDER — ERGOCALCIFEROL 1.25 MG/1
50000 CAPSULE ORAL WEEKLY
Qty: 5 CAPSULE | Refills: 5 | Status: SHIPPED | OUTPATIENT
Start: 2019-12-18 | End: 2020-06-03 | Stop reason: SDUPTHER

## 2019-12-18 RX ORDER — VITAMIN E 268 MG
400 CAPSULE ORAL DAILY
Qty: 30 CAPSULE | Refills: 5 | Status: SHIPPED | OUTPATIENT
Start: 2019-12-18 | End: 2020-06-03 | Stop reason: SDUPTHER

## 2019-12-18 NOTE — TELEPHONE ENCOUNTER
----- Message from Aba Gallagher Rep sent at 12/16/2019  4:39 PM EST -----  Regarding: x ray   Will you get xray shoulder report  off [facility] please and call her back. With result  I have called twice and they  still havent sent it. thanks      I spoke with her and she said she does think it is getting better I told her by Monday let us know and she may have to have an mri

## 2020-01-06 RX ORDER — OMEPRAZOLE 40 MG/1
CAPSULE, DELAYED RELEASE ORAL
Qty: 30 CAPSULE | Refills: 2 | Status: SHIPPED | OUTPATIENT
Start: 2020-01-06 | End: 2020-06-03 | Stop reason: SDUPTHER

## 2020-01-06 RX ORDER — LISINOPRIL 5 MG/1
TABLET ORAL
Qty: 30 TABLET | Refills: 2 | Status: SHIPPED | OUTPATIENT
Start: 2020-01-06 | End: 2020-06-03 | Stop reason: SDUPTHER

## 2020-02-18 ENCOUNTER — CLINICAL SUPPORT (OUTPATIENT)
Dept: FAMILY MEDICINE CLINIC | Facility: CLINIC | Age: 45
End: 2020-02-18

## 2020-02-18 DIAGNOSIS — E53.8 VITAMIN B 12 DEFICIENCY: ICD-10-CM

## 2020-02-18 PROCEDURE — 96372 THER/PROPH/DIAG INJ SC/IM: CPT | Performed by: NURSE PRACTITIONER

## 2020-02-18 RX ADMIN — CYANOCOBALAMIN 1000 MCG: 1000 INJECTION, SOLUTION INTRAMUSCULAR; SUBCUTANEOUS at 10:22

## 2020-04-29 ENCOUNTER — OFFICE VISIT (OUTPATIENT)
Dept: FAMILY MEDICINE CLINIC | Facility: CLINIC | Age: 45
End: 2020-04-29

## 2020-04-29 DIAGNOSIS — I25.10 CVD (CARDIOVASCULAR DISEASE): Primary | ICD-10-CM

## 2020-04-29 DIAGNOSIS — I25.10 CORONARY ARTERY DISEASE INVOLVING NATIVE CORONARY ARTERY OF NATIVE HEART WITHOUT ANGINA PECTORIS: ICD-10-CM

## 2020-04-29 PROCEDURE — 99441 PR PHYS/QHP TELEPHONE EVALUATION 5-10 MIN: CPT | Performed by: NURSE PRACTITIONER

## 2020-04-29 NOTE — PROGRESS NOTES
Establish patient called office of APRN today to discuss:   CC    Should not go back to work, I am afraid of COVID-19    You have chosen to receive care through a telephone visit today. Do you consent to use a telephone visit for your medical care today? Yes     Brief HPI/ROS obtained as follows:     45-year-old female with previous CABG and current coronary artery disease.  She works as a commercial  in department stores.  She is off at this time on unemployment.  She is worried about going back to work due to her increased risk for complications if she obtains a coronavirus.  She has not seen her grandchildren in over 2 months as she is trying to avoid any possible exposure.  Her cardiologist has instructed her that she is at increased risk and it can be very dangerous if she is infected.    Review of Systems   Constitutional: Negative for activity change, chills, fatigue and fever.   HENT: Negative for congestion, sinus pressure, sinus pain and sore throat.    Eyes: Negative.    Respiratory: Negative for cough, chest tightness, shortness of breath and wheezing.    Gastrointestinal: Negative for abdominal pain, diarrhea, nausea and vomiting.   Endocrine: Negative.    Genitourinary: Negative for dysuria.   Musculoskeletal: Positive for arthralgias and back pain. Negative for neck pain and neck stiffness.   Skin: Negative.    Allergic/Immunologic: Positive for environmental allergies. Negative for food allergies and immunocompromised state.   Neurological: Negative for dizziness.   Hematological: Negative.    Psychiatric/Behavioral: Negative for dysphoric mood, self-injury and suicidal ideas.       The current allergy list and medication list was reviewed with patient for accuracy.     Assessment   Talkative and friendly.  Cooperative.  Alert and oriented x3.  Normal mood and thought process.    Diagnoses and all orders for this visit:    CVD (cardiovascular disease)    Coronary artery disease involving  native coronary artery of native heart without angina pectoris           Coronavirus precautions have been reviewed and discussed.  I have discussed the CDC recommendations  of social distancing, hand washing and disinfecting commonly touched items. Reviewed need to notify PCP and self quarantine with mild symptoms.  Discussed procedure to obtain Covid-19 testing and notification of PCP/health dept/ED/Urgent Center if symptoms begin. Understanding verbalized.    It is my recommendation that she remain off work at this time due to her increased risk for complications.  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      Patient instructed and advised to call if symptoms are increasing or new symptoms occur.    Understands reasons for urgent and emergent care.  Patient (& family) verbalized agreement for treatment plan.     I would like to see her back in 6 weeks.  At that time we will reevaluate the current state of the coronavirus pandemic in [place] and our local area.  If the risk have greatly decreased and her employer is able to provide her with appropriate PPE such as an N 95 mask then I  may consider letting her return to work.      This visit has been rescheduled as a phone visit to comply with patient safety concerns in accordance with CDC recommendations. Total time of discussion was 9 minutes.

## 2020-05-13 RX ORDER — ROSUVASTATIN CALCIUM 20 MG/1
TABLET, COATED ORAL
Qty: 30 TABLET | Refills: 5 | Status: SHIPPED | OUTPATIENT
Start: 2020-05-13 | End: 2020-06-03 | Stop reason: SDUPTHER

## 2020-05-18 ENCOUNTER — CLINICAL SUPPORT (OUTPATIENT)
Dept: FAMILY MEDICINE CLINIC | Facility: CLINIC | Age: 45
End: 2020-05-18

## 2020-05-18 DIAGNOSIS — E53.8 VITAMIN B 12 DEFICIENCY: ICD-10-CM

## 2020-05-18 PROCEDURE — 96372 THER/PROPH/DIAG INJ SC/IM: CPT | Performed by: NURSE PRACTITIONER

## 2020-05-18 RX ADMIN — CYANOCOBALAMIN 1000 MCG: 1000 INJECTION, SOLUTION INTRAMUSCULAR; SUBCUTANEOUS at 14:25

## 2020-06-03 ENCOUNTER — OFFICE VISIT (OUTPATIENT)
Dept: FAMILY MEDICINE CLINIC | Facility: CLINIC | Age: 45
End: 2020-06-03

## 2020-06-03 VITALS
DIASTOLIC BLOOD PRESSURE: 80 MMHG | HEART RATE: 73 BPM | BODY MASS INDEX: 31.28 KG/M2 | WEIGHT: 170 LBS | TEMPERATURE: 98.6 F | OXYGEN SATURATION: 96 % | SYSTOLIC BLOOD PRESSURE: 120 MMHG | HEIGHT: 62 IN

## 2020-06-03 DIAGNOSIS — R42 VERTIGO: ICD-10-CM

## 2020-06-03 DIAGNOSIS — G89.29 CHRONIC LEFT SHOULDER PAIN: ICD-10-CM

## 2020-06-03 DIAGNOSIS — F06.4 ANXIETY DISORDER DUE TO GENERAL MEDICAL CONDITION WITH PANIC ATTACK: ICD-10-CM

## 2020-06-03 DIAGNOSIS — M25.512 CHRONIC LEFT SHOULDER PAIN: ICD-10-CM

## 2020-06-03 DIAGNOSIS — I25.10 CORONARY ARTERY DISEASE INVOLVING NATIVE CORONARY ARTERY OF NATIVE HEART WITHOUT ANGINA PECTORIS: ICD-10-CM

## 2020-06-03 DIAGNOSIS — E66.9 OBESITY (BMI 30-39.9): Primary | ICD-10-CM

## 2020-06-03 DIAGNOSIS — F41.0 ANXIETY DISORDER DUE TO GENERAL MEDICAL CONDITION WITH PANIC ATTACK: ICD-10-CM

## 2020-06-03 PROCEDURE — 99214 OFFICE O/P EST MOD 30 MIN: CPT | Performed by: NURSE PRACTITIONER

## 2020-06-03 RX ORDER — ACETAMINOPHEN 325 MG/1
325 TABLET ORAL EVERY 6 HOURS PRN
Qty: 30 TABLET | Refills: 0 | Status: SHIPPED | OUTPATIENT
Start: 2020-06-03 | End: 2021-02-02

## 2020-06-03 RX ORDER — MECLIZINE HYDROCHLORIDE 25 MG/1
25 TABLET ORAL 3 TIMES DAILY PRN
Qty: 60 TABLET | Refills: 5 | Status: ON HOLD | OUTPATIENT
Start: 2020-06-03 | End: 2021-04-19

## 2020-06-03 RX ORDER — LISINOPRIL 5 MG/1
5 TABLET ORAL DAILY
Qty: 30 TABLET | Refills: 2 | Status: SHIPPED | OUTPATIENT
Start: 2020-06-03 | End: 2021-04-05

## 2020-06-03 RX ORDER — VITAMIN E 268 MG
400 CAPSULE ORAL DAILY
Qty: 30 CAPSULE | Refills: 5 | Status: ON HOLD | OUTPATIENT
Start: 2020-06-03 | End: 2021-04-19

## 2020-06-03 RX ORDER — OMEPRAZOLE 40 MG/1
40 CAPSULE, DELAYED RELEASE ORAL DAILY
Qty: 30 CAPSULE | Refills: 2 | Status: SHIPPED | OUTPATIENT
Start: 2020-06-03 | End: 2022-06-29 | Stop reason: SDUPTHER

## 2020-06-03 RX ORDER — ERGOCALCIFEROL 1.25 MG/1
50000 CAPSULE ORAL WEEKLY
Qty: 5 CAPSULE | Refills: 5 | Status: SHIPPED | OUTPATIENT
Start: 2020-06-03 | End: 2021-02-15 | Stop reason: SDUPTHER

## 2020-06-03 RX ORDER — NITROGLYCERIN 0.3 MG/1
0.3 TABLET SUBLINGUAL
Qty: 30 TABLET | Refills: 12 | Status: ON HOLD | OUTPATIENT
Start: 2020-06-03 | End: 2021-04-19

## 2020-06-03 RX ORDER — METOPROLOL SUCCINATE 25 MG/1
12.5 TABLET, EXTENDED RELEASE ORAL DAILY
Qty: 30 TABLET | Refills: 11 | Status: ON HOLD | OUTPATIENT
Start: 2020-06-03 | End: 2021-04-19

## 2020-06-03 RX ORDER — ROSUVASTATIN CALCIUM 20 MG/1
20 TABLET, COATED ORAL DAILY
Qty: 30 TABLET | Refills: 5 | Status: SHIPPED | OUTPATIENT
Start: 2020-06-03 | End: 2022-06-29 | Stop reason: SDUPTHER

## 2020-06-03 RX ORDER — LORATADINE 10 MG/1
10 TABLET ORAL DAILY PRN
Qty: 30 TABLET | Refills: 5 | Status: ON HOLD | OUTPATIENT
Start: 2020-06-03 | End: 2021-04-19

## 2020-06-03 RX ORDER — ASPIRIN 325 MG
325 TABLET, DELAYED RELEASE (ENTERIC COATED) ORAL DAILY
Qty: 90 TABLET | Refills: 3 | Status: SHIPPED | OUTPATIENT
Start: 2020-06-03 | End: 2021-02-02

## 2020-06-03 RX ORDER — BUPROPION HYDROCHLORIDE 300 MG/1
300 TABLET ORAL EVERY MORNING
Qty: 90 TABLET | Refills: 1 | Status: SHIPPED | OUTPATIENT
Start: 2020-06-03 | End: 2022-06-29 | Stop reason: SDUPTHER

## 2020-06-03 RX ORDER — CLOPIDOGREL BISULFATE 75 MG/1
75 TABLET ORAL DAILY
Qty: 90 TABLET | Refills: 3 | Status: SHIPPED | OUTPATIENT
Start: 2020-06-03 | End: 2022-06-29 | Stop reason: SDUPTHER

## 2020-06-03 RX ORDER — CYANOCOBALAMIN 1000 UG/ML
1000 INJECTION, SOLUTION INTRAMUSCULAR; SUBCUTANEOUS
Qty: 1 ML | Refills: 11 | Status: ON HOLD | OUTPATIENT
Start: 2020-06-03 | End: 2021-04-19

## 2020-06-03 RX ORDER — FUROSEMIDE 20 MG/1
20 TABLET ORAL DAILY PRN
Qty: 30 TABLET | Refills: 5 | Status: ON HOLD | OUTPATIENT
Start: 2020-06-03 | End: 2021-04-19

## 2020-06-03 RX ORDER — POTASSIUM CHLORIDE 750 MG/1
10 TABLET, EXTENDED RELEASE ORAL DAILY
Qty: 30 TABLET | Refills: 5 | Status: ON HOLD | OUTPATIENT
Start: 2020-06-03 | End: 2021-04-19

## 2020-06-03 RX ORDER — ASPIRIN 325 MG
325 TABLET, DELAYED RELEASE (ENTERIC COATED) ORAL DAILY
COMMUNITY
Start: 2020-05-13 | End: 2020-06-03

## 2020-06-03 NOTE — ASSESSMENT & PLAN NOTE
Obesity is worsening.  Discussed the patient's BMI.  The BMI is above average; BMI management plan is completed.  General weight loss/lifestyle modification strategies discussed (elicit support from others; identify saboteurs; non-food rewards, etc).  Informal exercise measures discussed, e.g. taking stairs instead of elevator.  1200-calorie diet diet with no more than 25 g of carbohydrate.  Daily exercise, encouraged walking.

## 2020-06-03 NOTE — PROGRESS NOTES
Subjective   Ekaterina Verdin is a 45 y.o. female.     CC  Talk about my weight  Should not go back to work      History of Present Illness:    Been off work for several months on unemployment due to covid - 19 risks. Previous MI with 2 stents. Under the care of cardiology.   No chest pain.  Remains on statin.  Monitors blood pressure randomly.  Reports compliance with her medication.  She is currently receiving Plavix and aspirin.  Denies any abnormal bleeding.  Remains under the care of a [facility] cardiology.        Obesity-patient has had a difficult time losing weight.  She would like to discuss her options for losing weight.  She has been watching what she eats.  Eats a lot of fresh fruits and vegetables.  She exercises by walking daily.    Anxiety-patient reports her anxiety is improved with Wellbutrin.  Denies any thoughts of hurting self or others.    Essential hypertension-stable with current medications.    B12 deficiency-taking B12 injections monthly.    Gout-no recent exacerbation.    Vitamin D deficiency-taking a vitamin D weekly supplement.    Left shoulder pain-improved with the body mechanics and diclofenac.    Vertigo-improved with meclizine    The following portions of the patient's history and ROS were reviewed and updated as appropriate per provider:  Allergies, current medications, past family history, past medical history, past social history, past surgical history and problem list.    Review of Systems   Constitutional: Positive for unexpected weight change. Negative for activity change, appetite change, chills and fever.   HENT: Negative for congestion, sinus pressure, sinus pain, sore throat and trouble swallowing.    Eyes: Negative.    Respiratory: Negative for cough, chest tightness and shortness of breath.    Cardiovascular: Negative.    Gastrointestinal: Negative for abdominal pain, blood in stool, constipation, diarrhea and vomiting.   Endocrine: Negative.    Genitourinary: Negative for  "dysuria, flank pain and frequency.   Musculoskeletal: Positive for back pain. Negative for gait problem and neck pain.   Skin: Negative.    Allergic/Immunologic: Positive for environmental allergies. Negative for food allergies and immunocompromised state.   Neurological: Negative.    Hematological: Negative.    Psychiatric/Behavioral: Negative for dysphoric mood, self-injury and suicidal ideas.       Objective     /80   Pulse 73   Temp 98.6 °F (37 °C) (Temporal)   Ht 157.5 cm (62\")   Wt 77.1 kg (170 lb)   SpO2 96%   BMI 31.09 kg/m²   Lab on 12/13/2019   Component Date Value Ref Range Status   • Glucose 12/13/2019 92  65 - 99 mg/dL Final   • BUN 12/13/2019 10  6 - 20 mg/dL Final   • Creatinine 12/13/2019 0.91  0.57 - 1.00 mg/dL Final   • Sodium 12/13/2019 140  136 - 145 mmol/L Final   • Potassium 12/13/2019 4.3  3.5 - 5.2 mmol/L Final   • Chloride 12/13/2019 103  98 - 107 mmol/L Final   • CO2 12/13/2019 25.7  22.0 - 29.0 mmol/L Final   • Calcium 12/13/2019 9.4  8.6 - 10.5 mg/dL Final   • Total Protein 12/13/2019 7.5  6.0 - 8.5 g/dL Final   • Albumin 12/13/2019 4.40  3.50 - 5.20 g/dL Final   • ALT (SGPT) 12/13/2019 13  1 - 33 U/L Final   • AST (SGOT) 12/13/2019 12  1 - 32 U/L Final   • Alkaline Phosphatase 12/13/2019 47  39 - 117 U/L Final   • Total Bilirubin 12/13/2019 0.2  0.2 - 1.2 mg/dL Final   • eGFR Non African Amer 12/13/2019 67  >60 mL/min/1.73 Final   • Globulin 12/13/2019 3.1  gm/dL Final   • A/G Ratio 12/13/2019 1.4  g/dL Final   • BUN/Creatinine Ratio 12/13/2019 11.0  7.0 - 25.0 Final   • Anion Gap 12/13/2019 11.3  5.0 - 15.0 mmol/L Final   • TSH 12/13/2019 1.970  0.270 - 4.200 uIU/mL Final   • Hemoglobin A1C 12/13/2019 5.76* 4.80 - 5.60 % Final   • 25 Hydroxy, Vitamin D 12/13/2019 24.1* 30.0 - 100.0 ng/ml Final   • Total Cholesterol 12/13/2019 120  0 - 200 mg/dL Final   • Triglycerides 12/13/2019 96  0 - 150 mg/dL Final   • HDL Cholesterol 12/13/2019 43  40 - 60 mg/dL Final   • LDL " Cholesterol  12/13/2019 58  0 - 100 mg/dL Final   • VLDL Cholesterol 12/13/2019 19.2  5 - 40 mg/dL Final   • LDL/HDL Ratio 12/13/2019 1.34   Final   • Vitamin B-12 12/13/2019 408  211 - 946 pg/mL Final   • WBC 12/13/2019 9.38  3.40 - 10.80 10*3/mm3 Final   • RBC 12/13/2019 4.07  3.77 - 5.28 10*6/mm3 Final   • Hemoglobin 12/13/2019 12.6  12.0 - 15.9 g/dL Final   • Hematocrit 12/13/2019 36.6  34.0 - 46.6 % Final   • MCV 12/13/2019 89.9  79.0 - 97.0 fL Final   • MCH 12/13/2019 31.0  26.6 - 33.0 pg Final   • MCHC 12/13/2019 34.4  31.5 - 35.7 g/dL Final   • RDW 12/13/2019 12.3  12.3 - 15.4 % Final   • RDW-SD 12/13/2019 40.5  37.0 - 54.0 fl Final   • MPV 12/13/2019 11.7  6.0 - 12.0 fL Final   • Platelets 12/13/2019 279  140 - 450 10*3/mm3 Final   • Neutrophil % 12/13/2019 73.9  42.7 - 76.0 % Final   • Lymphocyte % 12/13/2019 19.3* 19.6 - 45.3 % Final   • Monocyte % 12/13/2019 5.8  5.0 - 12.0 % Final   • Eosinophil % 12/13/2019 0.2* 0.3 - 6.2 % Final   • Basophil % 12/13/2019 0.4  0.0 - 1.5 % Final   • Immature Grans % 12/13/2019 0.4  0.0 - 0.5 % Final   • Neutrophils, Absolute 12/13/2019 6.93  1.70 - 7.00 10*3/mm3 Final   • Lymphocytes, Absolute 12/13/2019 1.81  0.70 - 3.10 10*3/mm3 Final   • Monocytes, Absolute 12/13/2019 0.54  0.10 - 0.90 10*3/mm3 Final   • Eosinophils, Absolute 12/13/2019 0.02  0.00 - 0.40 10*3/mm3 Final   • Basophils, Absolute 12/13/2019 0.04  0.00 - 0.20 10*3/mm3 Final   • Immature Grans, Absolute 12/13/2019 0.04  0.00 - 0.05 10*3/mm3 Final   • nRBC 12/13/2019 0.0  0.0 - 0.2 /100 WBC Final       Physical Exam   Constitutional: She is oriented to person, place, and time. Vital signs are normal. She appears well-developed and well-nourished. No distress.   HENT:   Head: Normocephalic.   Right Ear: External ear normal.   Left Ear: External ear normal.   Nose: Nose normal.   Mouth/Throat: Oropharynx is clear and moist. No oropharyngeal exudate.   Eyes: Pupils are equal, round, and reactive to light.  Conjunctivae, EOM and lids are normal. Right eye exhibits no discharge. Left eye exhibits no discharge.   Neck: Normal range of motion. Neck supple. No tracheal deviation present. No thyromegaly present.   Cardiovascular: Normal rate, regular rhythm and normal heart sounds. Exam reveals no gallop and no friction rub.   No murmur heard.  Pulmonary/Chest: Effort normal and breath sounds normal. No respiratory distress. She has no wheezes. She has no rales. She exhibits no tenderness.   Abdominal: Soft. Normal appearance and bowel sounds are normal. She exhibits no distension and no mass. There is no tenderness. There is no rebound and no guarding.   Musculoskeletal: Normal range of motion.   Lymphadenopathy:     She has no cervical adenopathy.   Neurological: She is alert and oriented to person, place, and time. She has normal reflexes.   CN 2-12 grossly intact    Skin: Skin is warm and dry. Capillary refill takes less than 2 seconds. No rash noted. She is not diaphoretic. No erythema.   Psychiatric: She has a normal mood and affect. Her speech is normal and behavior is normal. Judgment and thought content normal. Cognition and memory are normal.   Vitals reviewed.      Assessment/Plan     Problem List Items Addressed This Visit        Cardiovascular and Mediastinum    Coronary artery disease involving native coronary artery of native heart without angina pectoris    Relevant Medications    clopidogrel (PLAVIX) 75 MG tablet    metoprolol succinate XL (TOPROL-XL) 25 MG 24 hr tablet    nitroglycerin (Nitrostat) 0.3 MG SL tablet    Other Relevant Orders    CBC & Differential    Comprehensive Metabolic Panel    TSH    Hemoglobin A1c    Vitamin D 25 Hydroxy    Vitamin B12    Lipid Panel    MicroAlbumin, Urine, Random - Urine, Clean Catch       Digestive    Obesity (BMI 30-39.9) - Primary    Current Assessment & Plan     Obesity is worsening.  Discussed the patient's BMI.  The BMI is above average; BMI management plan is  completed.  General weight loss/lifestyle modification strategies discussed (elicit support from others; identify saboteurs; non-food rewards, etc).  Informal exercise measures discussed, e.g. taking stairs instead of elevator.  1200-calorie diet diet with no more than 25 g of carbohydrate.  Daily exercise, encouraged walking.         Relevant Orders    CBC & Differential    Comprehensive Metabolic Panel    TSH    Hemoglobin A1c    Vitamin D 25 Hydroxy    Vitamin B12    Lipid Panel    MicroAlbumin, Urine, Random - Urine, Clean Catch       Other    Anxiety disorder due to general medical condition with panic attack    Relevant Medications    buPROPion XL (WELLBUTRIN XL) 300 MG 24 hr tablet    Other Relevant Orders    CBC & Differential    Comprehensive Metabolic Panel    TSH    Hemoglobin A1c    Vitamin D 25 Hydroxy    Vitamin B12    Lipid Panel    MicroAlbumin, Urine, Random - Urine, Clean Catch    Vertigo    Relevant Medications    meclizine (ANTIVERT) 25 MG tablet    Other Relevant Orders    CBC & Differential    Comprehensive Metabolic Panel    TSH    Hemoglobin A1c    Vitamin D 25 Hydroxy    Vitamin B12    Lipid Panel    MicroAlbumin, Urine, Random - Urine, Clean Catch      Other Visit Diagnoses     Chronic left shoulder pain        Relevant Medications    diclofenac (VOLTAREN) 1 % gel gel    acetaminophen (Tylenol) 325 MG tablet    Other Relevant Orders    CBC & Differential    Comprehensive Metabolic Panel    TSH    Hemoglobin A1c    Vitamin D 25 Hydroxy    Vitamin B12    Lipid Panel    MicroAlbumin, Urine, Random - Urine, Clean Catch          Current Outpatient Medications:   •  acetaminophen (Tylenol) 325 MG tablet, Take 1 tablet by mouth Every 6 (Six) Hours As Needed for Mild Pain ., Disp: 30 tablet, Rfl: 0  •  aspirin 81 MG tablet, Take 1 tablet by mouth Daily., Disp: 30 tablet, Rfl: 11  •  aspirin  MG tablet, Take 1 tablet by mouth Daily., Disp: 90 tablet, Rfl: 3  •  buPROPion XL (WELLBUTRIN XL) 300  MG 24 hr tablet, Take 1 tablet by mouth Every Morning., Disp: 90 tablet, Rfl: 1  •  clopidogrel (PLAVIX) 75 MG tablet, Take 1 tablet by mouth Daily., Disp: 90 tablet, Rfl: 3  •  cyanocobalamin 1000 MCG/ML injection, Inject 1 mL into the appropriate muscle as directed by prescriber Every 28 (Twenty-Eight) Days. Given at home, Disp: 1 mL, Rfl: 11  •  diclofenac (VOLTAREN) 1 % gel gel, Apply 4 g topically to the appropriate area as directed 4 (Four) Times a Day As Needed (left shoulder)., Disp: 100 g, Rfl: 5  •  furosemide (LASIX) 20 MG tablet, Take 1 tablet by mouth Daily As Needed (swelling)., Disp: 30 tablet, Rfl: 5  •  lisinopril (PRINIVIL,ZESTRIL) 5 MG tablet, Take 1 tablet by mouth Daily., Disp: 30 tablet, Rfl: 2  •  loratadine (CLARITIN) 10 MG tablet, Take 1 tablet by mouth Daily As Needed for Allergies., Disp: 30 tablet, Rfl: 5  •  meclizine (ANTIVERT) 25 MG tablet, Take 1 tablet by mouth 3 (Three) Times a Day As Needed for Dizziness., Disp: 60 tablet, Rfl: 5  •  metoprolol succinate XL (TOPROL-XL) 25 MG 24 hr tablet, Take 0.5 tablets by mouth Daily., Disp: 30 tablet, Rfl: 11  •  nitroglycerin (Nitrostat) 0.3 MG SL tablet, Place 1 tablet under the tongue Every 5 (Five) Minutes As Needed for Chest Pain. Take no more than 3 doses in 15 minutes., Disp: 30 tablet, Rfl: 12  •  omeprazole (priLOSEC) 40 MG capsule, Take 1 capsule by mouth Daily., Disp: 30 capsule, Rfl: 2  •  potassium chloride (K-DUR,KLOR-CON) 10 MEQ CR tablet, Take 1 tablet by mouth Daily. Take only when take lasix, Disp: 30 tablet, Rfl: 5  •  rosuvastatin (CRESTOR) 20 MG tablet, Take 1 tablet by mouth Daily., Disp: 30 tablet, Rfl: 5  •  vitamin D (ERGOCALCIFEROL) 1.25 MG (49030 UT) capsule capsule, Take 1 capsule by mouth 1 (One) Time Per Week., Disp: 5 capsule, Rfl: 5  •  vitamin E (vitamin E) 400 UNIT capsule, Take 1 capsule by mouth Daily., Disp: 30 capsule, Rfl: 5    Current Facility-Administered Medications:   •  cyanocobalamin injection 1,000  mcg, 1,000 mcg, Intramuscular, Q30 Days, Ashley Alejandre APRN, 1,000 mcg at 05/18/20 4515    Medication list reviewed and discussed.  Refill routine medications.       Patient's Body mass index is 31.09 kg/m². BMI is above normal parameters. Recommendations include: exercise counseling and nutrition counseling.    Pt has been instructed today regarding low fat heart smart diet. Weight management and routine exercise has been recommended. Avoid high fat foods, starchy foods and processed foods. Increase lean meats, fresh vegetables and fresh fruits.     Recommend low carb heart healthy keto diet.  Encouraged her to search recipes on [employer].       I have discussed diagnosis in detail today allowing time for questions and answers. Patient is aware of reasons to seek urgent or emergent medical care as well as reasons to return to the clinic for evaluation. Possible side effects, interactions and progression of symptoms discussed as well. Patient / family states understanding.   Emotional support and active listening provided.     I have discussed coronavirus prevention.  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  Discussed need to wash hands and wear a mask.  Patient states understanding.  States that she probably will not return to work.  I have discussed with her that when her employer calls her back to work that unemployment will be stopped as she is not being placed off work due to medical condition.  Patient states understanding.      See her back in 3 months, fasting labs 1 week prior.  Sooner if needed.          This document has been electronically signed by:  ILEANA James, NP-C

## 2020-07-01 ENCOUNTER — OFFICE VISIT (OUTPATIENT)
Dept: FAMILY MEDICINE CLINIC | Facility: CLINIC | Age: 45
End: 2020-07-01

## 2020-07-01 VITALS
HEART RATE: 71 BPM | BODY MASS INDEX: 31.47 KG/M2 | RESPIRATION RATE: 16 BRPM | TEMPERATURE: 97.8 F | OXYGEN SATURATION: 97 % | HEIGHT: 62 IN | SYSTOLIC BLOOD PRESSURE: 134 MMHG | DIASTOLIC BLOOD PRESSURE: 74 MMHG | WEIGHT: 171 LBS

## 2020-07-01 DIAGNOSIS — R10.30 LOWER ABDOMINAL PAIN: ICD-10-CM

## 2020-07-01 DIAGNOSIS — R30.0 DYSURIA: Primary | ICD-10-CM

## 2020-07-01 DIAGNOSIS — N91.2 AMENORRHEA: ICD-10-CM

## 2020-07-01 DIAGNOSIS — E66.9 OBESITY (BMI 30-39.9): ICD-10-CM

## 2020-07-01 DIAGNOSIS — E53.8 VITAMIN B 12 DEFICIENCY: ICD-10-CM

## 2020-07-01 LAB

## 2020-07-01 PROCEDURE — 96372 THER/PROPH/DIAG INJ SC/IM: CPT | Performed by: NURSE PRACTITIONER

## 2020-07-01 PROCEDURE — 81025 URINE PREGNANCY TEST: CPT | Performed by: NURSE PRACTITIONER

## 2020-07-01 PROCEDURE — 99214 OFFICE O/P EST MOD 30 MIN: CPT | Performed by: NURSE PRACTITIONER

## 2020-07-01 RX ADMIN — CYANOCOBALAMIN 1000 MCG: 1000 INJECTION, SOLUTION INTRAMUSCULAR; SUBCUTANEOUS at 16:31

## 2020-07-01 NOTE — ASSESSMENT & PLAN NOTE
Obesity is unchanged.  Discussed the patient's BMI.  The BMI is above average; BMI management plan is completed.  General weight loss/lifestyle modification strategies discussed (elicit support from others; identify saboteurs; non-food rewards, etc).  Diet interventions: diet diary indefinitely and Low-fat heart healthy diet.

## 2020-07-01 NOTE — PROGRESS NOTES
Subjective   Ekaterina Verdin is a 45 y.o. female.     No chief complaint on file.    Chief complaint  Abdominal pain  Pressure when urinates    History of Present Illness:    B12 deficiency-requesting B12 injection    Abdominal pain over the past couple weeks in her lower abdomen, bilateral.  Patient has also not had a period in the past 6 weeks.  She did have an ablation in 2015 but her periods remained regular.  She did have a tubal many years ago.  Patient does report some discomfort and an occasional spot of blood over the past couple of weeks.  Feels pressure in bilateral ovarian area.  Is sexually active.  Does have some pressure and mild discomfort with urination.  Drinking well.  No fever.    Patient does report that the pain is been present for a couple of weeks and that it is improved today.    The following portions of the patient's history and ROS were reviewed and updated as appropriate per provider:  Allergies, current medications, past family history, past medical history, past social history, past surgical history and problem list.    Review of Systems   Constitutional: Negative for appetite change, chills, fatigue and fever.   HENT: Negative for congestion, sinus pressure, sinus pain, sneezing, sore throat and trouble swallowing.    Eyes: Negative.    Respiratory: Negative for cough, chest tightness, shortness of breath and wheezing.    Cardiovascular: Negative for chest pain, palpitations and leg swelling.   Gastrointestinal: Positive for abdominal pain and nausea. Negative for anal bleeding, blood in stool and vomiting.   Endocrine: Negative.    Genitourinary: Positive for dysuria, flank pain, menstrual problem and pelvic pain. Negative for decreased urine volume, difficulty urinating, urgency, vaginal discharge and vaginal pain.   Musculoskeletal: Negative for arthralgias, back pain, myalgias and neck pain.   Skin: Negative.    Allergic/Immunologic: Positive for environmental allergies. Negative for  "food allergies and immunocompromised state.   Neurological: Negative for seizures, speech difficulty and headaches.   Hematological: Negative.    Psychiatric/Behavioral: Negative for dysphoric mood, self-injury and suicidal ideas.       Objective     /74 (BP Location: Left arm, Patient Position: Sitting)   Pulse 71   Temp 97.8 °F (36.6 °C) (Temporal)   Resp 16   Ht 157.5 cm (62\")   Wt 77.6 kg (171 lb)   SpO2 97%   BMI 31.28 kg/m²   Lab on 12/13/2019   Component Date Value Ref Range Status   • Glucose 12/13/2019 92  65 - 99 mg/dL Final   • BUN 12/13/2019 10  6 - 20 mg/dL Final   • Creatinine 12/13/2019 0.91  0.57 - 1.00 mg/dL Final   • Sodium 12/13/2019 140  136 - 145 mmol/L Final   • Potassium 12/13/2019 4.3  3.5 - 5.2 mmol/L Final   • Chloride 12/13/2019 103  98 - 107 mmol/L Final   • CO2 12/13/2019 25.7  22.0 - 29.0 mmol/L Final   • Calcium 12/13/2019 9.4  8.6 - 10.5 mg/dL Final   • Total Protein 12/13/2019 7.5  6.0 - 8.5 g/dL Final   • Albumin 12/13/2019 4.40  3.50 - 5.20 g/dL Final   • ALT (SGPT) 12/13/2019 13  1 - 33 U/L Final   • AST (SGOT) 12/13/2019 12  1 - 32 U/L Final   • Alkaline Phosphatase 12/13/2019 47  39 - 117 U/L Final   • Total Bilirubin 12/13/2019 0.2  0.2 - 1.2 mg/dL Final   • eGFR Non African Amer 12/13/2019 67  >60 mL/min/1.73 Final   • Globulin 12/13/2019 3.1  gm/dL Final   • A/G Ratio 12/13/2019 1.4  g/dL Final   • BUN/Creatinine Ratio 12/13/2019 11.0  7.0 - 25.0 Final   • Anion Gap 12/13/2019 11.3  5.0 - 15.0 mmol/L Final   • TSH 12/13/2019 1.970  0.270 - 4.200 uIU/mL Final   • Hemoglobin A1C 12/13/2019 5.76* 4.80 - 5.60 % Final   • 25 Hydroxy, Vitamin D 12/13/2019 24.1* 30.0 - 100.0 ng/ml Final   • Total Cholesterol 12/13/2019 120  0 - 200 mg/dL Final   • Triglycerides 12/13/2019 96  0 - 150 mg/dL Final   • HDL Cholesterol 12/13/2019 43  40 - 60 mg/dL Final   • LDL Cholesterol  12/13/2019 58  0 - 100 mg/dL Final   • VLDL Cholesterol 12/13/2019 19.2  5 - 40 mg/dL Final   • " LDL/HDL Ratio 12/13/2019 1.34   Final   • Vitamin B-12 12/13/2019 408  211 - 946 pg/mL Final   • WBC 12/13/2019 9.38  3.40 - 10.80 10*3/mm3 Final   • RBC 12/13/2019 4.07  3.77 - 5.28 10*6/mm3 Final   • Hemoglobin 12/13/2019 12.6  12.0 - 15.9 g/dL Final   • Hematocrit 12/13/2019 36.6  34.0 - 46.6 % Final   • MCV 12/13/2019 89.9  79.0 - 97.0 fL Final   • MCH 12/13/2019 31.0  26.6 - 33.0 pg Final   • MCHC 12/13/2019 34.4  31.5 - 35.7 g/dL Final   • RDW 12/13/2019 12.3  12.3 - 15.4 % Final   • RDW-SD 12/13/2019 40.5  37.0 - 54.0 fl Final   • MPV 12/13/2019 11.7  6.0 - 12.0 fL Final   • Platelets 12/13/2019 279  140 - 450 10*3/mm3 Final   • Neutrophil % 12/13/2019 73.9  42.7 - 76.0 % Final   • Lymphocyte % 12/13/2019 19.3* 19.6 - 45.3 % Final   • Monocyte % 12/13/2019 5.8  5.0 - 12.0 % Final   • Eosinophil % 12/13/2019 0.2* 0.3 - 6.2 % Final   • Basophil % 12/13/2019 0.4  0.0 - 1.5 % Final   • Immature Grans % 12/13/2019 0.4  0.0 - 0.5 % Final   • Neutrophils, Absolute 12/13/2019 6.93  1.70 - 7.00 10*3/mm3 Final   • Lymphocytes, Absolute 12/13/2019 1.81  0.70 - 3.10 10*3/mm3 Final   • Monocytes, Absolute 12/13/2019 0.54  0.10 - 0.90 10*3/mm3 Final   • Eosinophils, Absolute 12/13/2019 0.02  0.00 - 0.40 10*3/mm3 Final   • Basophils, Absolute 12/13/2019 0.04  0.00 - 0.20 10*3/mm3 Final   • Immature Grans, Absolute 12/13/2019 0.04  0.00 - 0.05 10*3/mm3 Final   • nRBC 12/13/2019 0.0  0.0 - 0.2 /100 WBC Final       Physical Exam   Constitutional: She is oriented to person, place, and time. Vital signs are normal. She appears well-developed and well-nourished. She is cooperative.  Non-toxic appearance. She does not have a sickly appearance. She does not appear ill. No distress.   HENT:   Head: Normocephalic and atraumatic. Hair is normal.   Right Ear: Tympanic membrane, external ear and ear canal normal.   Left Ear: Tympanic membrane, external ear and ear canal normal.   Nose: Nose normal.   Mouth/Throat: Oropharynx is clear  and moist. No oropharyngeal exudate.   Eyes: Pupils are equal, round, and reactive to light. Conjunctivae, EOM and lids are normal. Right eye exhibits no discharge. Left eye exhibits no discharge.   Neck: Normal range of motion. Neck supple. No neck rigidity. No tracheal deviation, no erythema and normal range of motion present. No thyromegaly present.   Cardiovascular: Normal rate, regular rhythm, normal heart sounds and normal pulses. Exam reveals no gallop and no friction rub.   No murmur heard.  Pulmonary/Chest: Effort normal and breath sounds normal. No respiratory distress. She has no wheezes. She has no rales. She exhibits no tenderness.   Abdominal: Soft. Normal appearance and bowel sounds are normal. She exhibits no distension and no mass. There is no hepatosplenomegaly. There is tenderness in the right lower quadrant and left lower quadrant. There is no rigidity, no rebound and no guarding.   Musculoskeletal: Normal range of motion.   Lymphadenopathy:     She has no cervical adenopathy.   Neurological: She is alert and oriented to person, place, and time. She has normal reflexes.   CN 2-12 grossly intact    Skin: Skin is warm and dry. Capillary refill takes less than 2 seconds. No rash noted. She is not diaphoretic. No erythema. No pallor.   Psychiatric: She has a normal mood and affect. Her speech is normal and behavior is normal. Judgment and thought content normal. Cognition and memory are normal.   Vitals reviewed.      Assessment/Plan     Problem List Items Addressed This Visit        Digestive    Vitamin B 12 deficiency    Overview     Monthly injection per standing order         Obesity (BMI 30-39.9)    Current Assessment & Plan     Obesity is unchanged.  Discussed the patient's BMI.  The BMI is above average; BMI management plan is completed.  General weight loss/lifestyle modification strategies discussed (elicit support from others; identify saboteurs; non-food rewards, etc).  Diet interventions:  diet diary indefinitely and Low-fat heart healthy diet.            Genitourinary    Amenorrhea    Current Assessment & Plan     Negative urine pregnancy.         Relevant Orders    POCT pregnancy, urine    US Pelvis Complete    FSH & LH    Estrogens, Total    Progesterone    Testosterone      Other Visit Diagnoses     Dysuria    -  Primary    Relevant Orders    POCT urinalysis dipstick, manual (Completed)    POC Urinalysis Dipstick, Automated    Urinalysis With Culture If Indicated -    Lower abdominal pain        Relevant Orders    US Pelvis Complete    US Abdomen Complete          UA normal.  Urine pregnancy normal.  Order given for ultrasound of abdomen and pelvis which patient request to have performed at [facility].  I have marked this stat and the patient states that she will go and have it performed either tonight or in the morning.  I specifically would like to rule out a tubal pregnancy.  Discussed signs and symptoms of tubal pregnancy for which she should seek immediate medical attention.  Will order labs with hormone screening.       Patient's Body mass index is 31.28 kg/m². BMI is above normal parameters. Recommendations include: exercise counseling and nutrition counseling.       I have discussed diagnosis in detail today allowing time for questions and answers. Patient is aware of reasons to seek urgent or emergent medical care as well as reasons to return to the clinic for evaluation. Possible side effects, interactions and progression of symptoms discussed as well. Patient / family states understanding.   Emotional support and active listening provided.       I like to see her back in 1-2 weeks, sooner if needed.  Labs 1 week prior.    Staff of been notified to call for ultrasound results tomorrow morning.  [facility] is aware to call on-call provider if abnormal ultrasound.    Coronavirus precautions have been reviewed and discussed.  I have discussed the CDC recommendations  of social  distancing, hand washing, mask and disinfecting commonly touched items. Reviewed need to notify PCP and self quarantine with mild symptoms.  Discussed procedure to obtain Covid-19 testing and notification of PCP/health dept/ED/Urgent Center if symptoms begin. Understanding verbalized.          This document has been electronically signed by:  ILEANA James, NP-C

## 2020-07-07 DIAGNOSIS — R10.2 PELVIC PAIN: Primary | ICD-10-CM

## 2020-07-07 NOTE — PROGRESS NOTES
Reviewed recent ultrasounds of abdomen pelvis with findings of fatty liver and incidental finding of nabothian cyst.  Patient has been instructed to work on weight loss, decrease carbs in diet, avoid excessive Tylenol intake.  We are going to refer her to GYN of choice for additional Pap/pelvic exam.

## 2020-07-10 ENCOUNTER — TELEPHONE (OUTPATIENT)
Dept: FAMILY MEDICINE CLINIC | Facility: CLINIC | Age: 45
End: 2020-07-10

## 2020-07-10 NOTE — TELEPHONE ENCOUNTER
----- Message from ILEANA Angel sent at 7/9/2020  4:24 PM EDT -----  Refer patient to GYN of choice for Pap/pelvic exam and further evaluation.      I have her an apptointment

## 2020-08-31 ENCOUNTER — CLINICAL SUPPORT (OUTPATIENT)
Dept: FAMILY MEDICINE CLINIC | Facility: CLINIC | Age: 45
End: 2020-08-31

## 2020-08-31 DIAGNOSIS — E53.8 VITAMIN B 12 DEFICIENCY: ICD-10-CM

## 2020-08-31 PROCEDURE — 96372 THER/PROPH/DIAG INJ SC/IM: CPT | Performed by: NURSE PRACTITIONER

## 2020-08-31 RX ADMIN — CYANOCOBALAMIN 1000 MCG: 1000 INJECTION, SOLUTION INTRAMUSCULAR; SUBCUTANEOUS at 15:55

## 2020-10-22 ENCOUNTER — CLINICAL SUPPORT (OUTPATIENT)
Dept: FAMILY MEDICINE CLINIC | Facility: CLINIC | Age: 45
End: 2020-10-22

## 2020-10-22 DIAGNOSIS — E53.8 VITAMIN B 12 DEFICIENCY: ICD-10-CM

## 2020-10-22 DIAGNOSIS — Z23 NEED FOR INFLUENZA VACCINATION: ICD-10-CM

## 2020-10-22 PROCEDURE — 90686 IIV4 VACC NO PRSV 0.5 ML IM: CPT | Performed by: NURSE PRACTITIONER

## 2020-10-22 PROCEDURE — 90471 IMMUNIZATION ADMIN: CPT | Performed by: NURSE PRACTITIONER

## 2020-10-22 PROCEDURE — 96372 THER/PROPH/DIAG INJ SC/IM: CPT | Performed by: NURSE PRACTITIONER

## 2020-10-22 RX ADMIN — CYANOCOBALAMIN 1000 MCG: 1000 INJECTION, SOLUTION INTRAMUSCULAR; SUBCUTANEOUS at 17:21

## 2021-01-25 ENCOUNTER — TELEPHONE (OUTPATIENT)
Dept: FAMILY MEDICINE CLINIC | Facility: CLINIC | Age: 46
End: 2021-01-25

## 2021-01-25 NOTE — TELEPHONE ENCOUNTER
Caller: Ekaterina Verdin    Relationship: Self    Best call back number: [phone]    What orders are you requesting (i.e. lab or imaging): BLOOD WORK    In what timeframe would the patient need to come in: AS SOON AS POSSIBLE     Where will you receive your lab/imaging services: IN OFFICE     Additional notes: PATIENT WOULD LIKE TO MAKE AN APPOINTMENT TO GO OVER THE RESULTS OF THE BLOOD WORK AFTER SHE HAS THEM DONE     PLEASE CALL

## 2021-02-02 ENCOUNTER — OFFICE VISIT (OUTPATIENT)
Dept: FAMILY MEDICINE CLINIC | Facility: CLINIC | Age: 46
End: 2021-02-02

## 2021-02-02 VITALS
DIASTOLIC BLOOD PRESSURE: 80 MMHG | HEART RATE: 62 BPM | WEIGHT: 178 LBS | OXYGEN SATURATION: 96 % | HEIGHT: 62 IN | BODY MASS INDEX: 32.76 KG/M2 | TEMPERATURE: 96.4 F | SYSTOLIC BLOOD PRESSURE: 122 MMHG

## 2021-02-02 DIAGNOSIS — G89.29 CHRONIC LEFT SHOULDER PAIN: ICD-10-CM

## 2021-02-02 DIAGNOSIS — R42 VERTIGO: ICD-10-CM

## 2021-02-02 DIAGNOSIS — F41.0 ANXIETY DISORDER DUE TO GENERAL MEDICAL CONDITION WITH PANIC ATTACK: ICD-10-CM

## 2021-02-02 DIAGNOSIS — I10 ESSENTIAL HYPERTENSION: ICD-10-CM

## 2021-02-02 DIAGNOSIS — R30.0 DYSURIA: ICD-10-CM

## 2021-02-02 DIAGNOSIS — I25.10 CORONARY ARTERY DISEASE INVOLVING NATIVE CORONARY ARTERY OF NATIVE HEART WITHOUT ANGINA PECTORIS: ICD-10-CM

## 2021-02-02 DIAGNOSIS — M25.512 CHRONIC LEFT SHOULDER PAIN: ICD-10-CM

## 2021-02-02 DIAGNOSIS — M25.522 LEFT ELBOW PAIN: ICD-10-CM

## 2021-02-02 DIAGNOSIS — E66.9 OBESITY (BMI 30-39.9): ICD-10-CM

## 2021-02-02 DIAGNOSIS — I25.10 CVD (CARDIOVASCULAR DISEASE): Primary | ICD-10-CM

## 2021-02-02 DIAGNOSIS — F06.4 ANXIETY DISORDER DUE TO GENERAL MEDICAL CONDITION WITH PANIC ATTACK: ICD-10-CM

## 2021-02-02 LAB
25(OH)D3 SERPL-MCNC: 14 NG/ML (ref 30–100)
ALBUMIN SERPL-MCNC: 4.1 G/DL (ref 3.5–5.2)
ALBUMIN UR-MCNC: <1.2 MG/DL
ALBUMIN/GLOB SERPL: 1.4 G/DL
ALP SERPL-CCNC: 57 U/L (ref 39–117)
ALT SERPL W P-5'-P-CCNC: 29 U/L (ref 1–33)
ANION GAP SERPL CALCULATED.3IONS-SCNC: 11 MMOL/L (ref 5–15)
AST SERPL-CCNC: 22 U/L (ref 1–32)
BACTERIA UR QL AUTO: ABNORMAL /HPF
BASOPHILS # BLD AUTO: 0.06 10*3/MM3 (ref 0–0.2)
BASOPHILS NFR BLD AUTO: 1 % (ref 0–1.5)
BILIRUB SERPL-MCNC: 0.2 MG/DL (ref 0–1.2)
BILIRUB UR QL STRIP: NEGATIVE
BUN SERPL-MCNC: 10 MG/DL (ref 6–20)
BUN/CREAT SERPL: 10.1 (ref 7–25)
CALCIUM SPEC-SCNC: 8.9 MG/DL (ref 8.6–10.5)
CHLORIDE SERPL-SCNC: 99 MMOL/L (ref 98–107)
CHOLEST SERPL-MCNC: 254 MG/DL (ref 0–200)
CLARITY UR: ABNORMAL
CO2 SERPL-SCNC: 23 MMOL/L (ref 22–29)
COLOR UR: YELLOW
CREAT SERPL-MCNC: 0.99 MG/DL (ref 0.57–1)
DEPRECATED RDW RBC AUTO: 41.3 FL (ref 37–54)
EOSINOPHIL # BLD AUTO: 0.09 10*3/MM3 (ref 0–0.4)
EOSINOPHIL NFR BLD AUTO: 1.6 % (ref 0.3–6.2)
ERYTHROCYTE [DISTWIDTH] IN BLOOD BY AUTOMATED COUNT: 13.2 % (ref 12.3–15.4)
GFR SERPL CREATININE-BSD FRML MDRD: 60 ML/MIN/1.73
GLOBULIN UR ELPH-MCNC: 2.9 GM/DL
GLUCOSE SERPL-MCNC: 85 MG/DL (ref 65–99)
GLUCOSE UR STRIP-MCNC: NEGATIVE MG/DL
HBA1C MFR BLD: 5.6 % (ref 4.8–5.6)
HCT VFR BLD AUTO: 37.7 % (ref 34–46.6)
HDLC SERPL-MCNC: 28 MG/DL (ref 40–60)
HGB BLD-MCNC: 13.2 G/DL (ref 12–15.9)
HGB UR QL STRIP.AUTO: ABNORMAL
HYALINE CASTS UR QL AUTO: ABNORMAL /LPF
IMM GRANULOCYTES # BLD AUTO: 0.04 10*3/MM3 (ref 0–0.05)
IMM GRANULOCYTES NFR BLD AUTO: 0.7 % (ref 0–0.5)
KETONES UR QL STRIP: NEGATIVE
LDLC SERPL CALC-MCNC: 114 MG/DL (ref 0–100)
LDLC/HDLC SERPL: 3.59 {RATIO}
LEUKOCYTE ESTERASE UR QL STRIP.AUTO: NEGATIVE
LYMPHOCYTES # BLD AUTO: 1.96 10*3/MM3 (ref 0.7–3.1)
LYMPHOCYTES NFR BLD AUTO: 34 % (ref 19.6–45.3)
MCH RBC QN AUTO: 30.6 PG (ref 26.6–33)
MCHC RBC AUTO-ENTMCNC: 35 G/DL (ref 31.5–35.7)
MCV RBC AUTO: 87.3 FL (ref 79–97)
MONOCYTES # BLD AUTO: 0.43 10*3/MM3 (ref 0.1–0.9)
MONOCYTES NFR BLD AUTO: 7.5 % (ref 5–12)
NEUTROPHILS NFR BLD AUTO: 3.18 10*3/MM3 (ref 1.7–7)
NEUTROPHILS NFR BLD AUTO: 55.2 % (ref 42.7–76)
NITRITE UR QL STRIP: NEGATIVE
NRBC BLD AUTO-RTO: 0 /100 WBC (ref 0–0.2)
PH UR STRIP.AUTO: 5.5 [PH] (ref 5–8)
PLATELET # BLD AUTO: 286 10*3/MM3 (ref 140–450)
PMV BLD AUTO: 12.2 FL (ref 6–12)
POTASSIUM SERPL-SCNC: 4.3 MMOL/L (ref 3.5–5.2)
PROT SERPL-MCNC: 7 G/DL (ref 6–8.5)
PROT UR QL STRIP: NEGATIVE
RBC # BLD AUTO: 4.32 10*6/MM3 (ref 3.77–5.28)
RBC # UR: ABNORMAL /HPF
REF LAB TEST METHOD: ABNORMAL
SODIUM SERPL-SCNC: 133 MMOL/L (ref 136–145)
SP GR UR STRIP: 1.02 (ref 1–1.03)
SQUAMOUS #/AREA URNS HPF: ABNORMAL /HPF
TRIGL SERPL-MCNC: 628 MG/DL (ref 0–150)
TSH SERPL DL<=0.05 MIU/L-ACNC: 3.45 UIU/ML (ref 0.27–4.2)
UROBILINOGEN UR QL STRIP: ABNORMAL
VIT B12 BLD-MCNC: 337 PG/ML (ref 211–946)
VLDLC SERPL-MCNC: 112 MG/DL (ref 5–40)
WBC # BLD AUTO: 5.76 10*3/MM3 (ref 3.4–10.8)
WBC UR QL AUTO: ABNORMAL /HPF

## 2021-02-02 PROCEDURE — 81001 URINALYSIS AUTO W/SCOPE: CPT | Performed by: NURSE PRACTITIONER

## 2021-02-02 PROCEDURE — 99214 OFFICE O/P EST MOD 30 MIN: CPT | Performed by: NURSE PRACTITIONER

## 2021-02-02 PROCEDURE — 80050 GENERAL HEALTH PANEL: CPT | Performed by: NURSE PRACTITIONER

## 2021-02-02 PROCEDURE — 82607 VITAMIN B-12: CPT | Performed by: NURSE PRACTITIONER

## 2021-02-02 PROCEDURE — 82043 UR ALBUMIN QUANTITATIVE: CPT | Performed by: NURSE PRACTITIONER

## 2021-02-02 PROCEDURE — 83036 HEMOGLOBIN GLYCOSYLATED A1C: CPT | Performed by: NURSE PRACTITIONER

## 2021-02-02 PROCEDURE — 82306 VITAMIN D 25 HYDROXY: CPT | Performed by: NURSE PRACTITIONER

## 2021-02-02 PROCEDURE — 80061 LIPID PANEL: CPT | Performed by: NURSE PRACTITIONER

## 2021-02-02 NOTE — ASSESSMENT & PLAN NOTE
Obesity is worsening.  Discussed the patient's BMI.  The BMI is above average; BMI management plan is completed.  General weight loss/lifestyle modification strategies discussed (elicit support from others; identify saboteurs; non-food rewards, etc).  1200-calorie a day diet, low-fat heart healthy choices, daily exercise
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
## 2021-02-15 ENCOUNTER — OFFICE VISIT (OUTPATIENT)
Dept: FAMILY MEDICINE CLINIC | Facility: CLINIC | Age: 46
End: 2021-02-15

## 2021-02-15 VITALS — BODY MASS INDEX: 31.47 KG/M2 | HEIGHT: 62 IN | WEIGHT: 171 LBS

## 2021-02-15 DIAGNOSIS — E66.9 OBESITY (BMI 30-39.9): Chronic | ICD-10-CM

## 2021-02-15 DIAGNOSIS — E78.1 PURE HYPERTRIGLYCERIDEMIA: Primary | Chronic | ICD-10-CM

## 2021-02-15 DIAGNOSIS — E53.8 VITAMIN B 12 DEFICIENCY: Chronic | ICD-10-CM

## 2021-02-15 PROCEDURE — 99442 PR PHYS/QHP TELEPHONE EVALUATION 11-20 MIN: CPT | Performed by: NURSE PRACTITIONER

## 2021-02-15 RX ORDER — ICOSAPENT ETHYL 1000 MG/1
CAPSULE ORAL
Qty: 120 CAPSULE | Refills: 5 | Status: ON HOLD | OUTPATIENT
Start: 2021-02-15 | End: 2021-04-19

## 2021-02-15 RX ORDER — ERGOCALCIFEROL 1.25 MG/1
50000 CAPSULE ORAL WEEKLY
Qty: 5 CAPSULE | Refills: 5 | Status: ON HOLD | OUTPATIENT
Start: 2021-02-15 | End: 2021-04-19 | Stop reason: DRUGHIGH

## 2021-02-15 NOTE — PROGRESS NOTES
Establish patient called office of APRN today to discuss:   CC    Cc  Lab review     You have chosen to receive care through a telephone visit today. Do you consent to use a telephone visit for your medical care today? Yes     Brief HPI/ROS obtained as follows:    High cholesterol/coronary artery disease- pt reports she had stopped her cholesterol medications as she heard it could hurt her vision so she quit her medication for over a month prior to labs. She has now started back on her diet and medication. Taking crestor 20 mg at this time. Has upcoming apt with cardiology. Previous MI.      Patient has started on low-carb diet and reports good weight loss over the last couple of weeks. She is curious if there is anything she can do to help boost energy while she is working on diet and exercise.    The following portions of the patient's history, chief complaint and ROS were reviewed and updated as appropriate per provider:  Allergies, current medications, past family history, past medical history, past social history, past surgical history and problem list.    Review of Systems   Constitutional: Positive for fatigue. Negative for activity change, appetite change, chills and fever.   HENT: Negative for congestion, ear pain, facial swelling, hearing loss, sinus pressure, sinus pain, sore throat, trouble swallowing and voice change.    Eyes: Negative for pain, discharge and visual disturbance.   Respiratory: Negative for apnea, cough, chest tightness, shortness of breath and wheezing.    Cardiovascular: Negative for chest pain, palpitations and leg swelling.   Gastrointestinal: Negative for abdominal pain, blood in stool, constipation, diarrhea, nausea and vomiting.   Endocrine: Negative.    Genitourinary: Negative for difficulty urinating, dysuria and frequency.   Musculoskeletal: Positive for arthralgias and back pain. Negative for gait problem, myalgias and neck stiffness.   Skin: Negative.  Negative for color  "change.   Allergic/Immunologic: Positive for environmental allergies. Negative for food allergies and immunocompromised state.   Neurological: Negative for dizziness, seizures, numbness and headaches.   Hematological: Negative.    Psychiatric/Behavioral: Negative for confusion, dysphoric mood, self-injury and suicidal ideas. The patient is not nervous/anxious.        The current allergy list and medication list was reviewed with patient for accuracy.     Assessment     Alert and oriented x3.  Respirations not labored with conversation.  No cough.  Speech normal.  Hearing adequate.  Cooperative.  Mood normal.  Thought process normal.    Ht 157.5 cm (62\")   Wt 77.6 kg (171 lb)   BMI 31.28 kg/m²     Diagnoses and all orders for this visit:    1. Pure hypertriglyceridemia (Primary)  Comments:  Take statin as ordered, discussed compliance. Add Vascepa. Keep cardiology follow-up next month.  Orders:  -     icosapent ethyl (Vascepa) 1 g capsule capsule; 2 twice daily  Dispense: 120 capsule; Refill: 5    2. Obesity (BMI 30-39.9)  Comments:  Heart healthy low fat diet recommended. Low-carb choices discussed. Recommend Be-thin Sublingual spray and lipo-mirna plus injections weekly as supplement.     3. Vitamin B 12 deficiency  Comments:  recommned she start lipo-mirna plus injections weekly for added B-12 and b vitamin supplements     Other orders  -     vitamin D (ERGOCALCIFEROL) 1.25 MG (60978 UT) capsule capsule; Take 1 capsule by mouth 1 (One) Time Per Week.  Dispense: 5 capsule; Refill: 5      Lab Results   Component Value Date    CHOL 254 (H) 02/02/2021    TRIG 628 (H) 02/02/2021    HDL 28 (L) 02/02/2021     (H) 02/02/2021     Lab Results   Component Value Date    WBC 5.76 02/02/2021    HGB 13.2 02/02/2021    HCT 37.7 02/02/2021     02/02/2021    CHOL 254 (H) 02/02/2021    TRIG 628 (H) 02/02/2021    HDL 28 (L) 02/02/2021    ALT 29 02/02/2021     Lab Results   Component Value Date    TSH 3.450 02/02/2021 "       Lab Results   Component Value Date    NCOBBPTA23 337 02/02/2021     Lab Results   Component Value Date    HGBA1C 5.60 02/02/2021       I have reviewed medication list and discussed with patient the possible side effects and interactions.  We have discussed purpose for medication, route, dosage, frequency.  Refill routine medications.  Pt has been instructed today regarding low fat heart smart diet. Weight management and routine exercise has been recommended. Avoid high fat foods, starchy foods and processed foods. Increase lean meats, fresh vegetables and fresh fruits.   We will add Vascepa as well as compliance with her statin therapy. She is going to keep her upcoming cardiology appointment for further review of labs. Will consider adding a fenofibrate if cardiology feels risk is worth the benefit.  Recent labs reviewed and discussed with patient.     Coronavirus precautions have been reviewed and discussed.  I have discussed the CDC recommendations  of social distancing, hand washing and disinfecting commonly touched items. Reviewed need to notify PCP and self quarantine with mild symptoms.  Discussed procedure to obtain Covid-19 testing and notification of PCP/health dept/ED/Urgent Center if symptoms begin. Understanding verbalized.    Patient instructed and advised to call if symptoms are increasing or new symptoms occur.    Understands reasons for urgent and emergent care.  Patient (& family) verbalized agreement for treatment plan.   I have discussed diagnosis in detail today allowing time for questions and answers. Pt is aware of reasons to seek urgent or emergent medical care as well as reasons to return to the clinic for evaluation. Possible side effects, interactions and progression of symptoms discussed as well. Pt / family states understanding.     Emotional support and active listening provided.     Patient may start vitamin injections and sublingual spray this week.  Will obtain  and has  administered in office.       This visit has been rescheduled as a phone visit to comply with patient safety concerns in accordance with CDC recommendations. Total time of discussion was 18 minutes.

## 2021-02-22 ENCOUNTER — TELEPHONE (OUTPATIENT)
Dept: FAMILY MEDICINE CLINIC | Facility: CLINIC | Age: 46
End: 2021-02-22

## 2021-02-25 ENCOUNTER — OFFICE VISIT (OUTPATIENT)
Dept: FAMILY MEDICINE CLINIC | Facility: CLINIC | Age: 46
End: 2021-02-25

## 2021-02-25 VITALS
HEART RATE: 88 BPM | OXYGEN SATURATION: 96 % | SYSTOLIC BLOOD PRESSURE: 130 MMHG | HEIGHT: 62 IN | WEIGHT: 170 LBS | DIASTOLIC BLOOD PRESSURE: 80 MMHG | BODY MASS INDEX: 31.28 KG/M2 | TEMPERATURE: 96.6 F

## 2021-02-25 DIAGNOSIS — E53.8 VITAMIN B 12 DEFICIENCY: ICD-10-CM

## 2021-02-25 DIAGNOSIS — E66.9 OBESITY (BMI 30-39.9): Primary | Chronic | ICD-10-CM

## 2021-02-25 PROCEDURE — 99213 OFFICE O/P EST LOW 20 MIN: CPT | Performed by: NURSE PRACTITIONER

## 2021-02-25 NOTE — PROGRESS NOTES
"Subjective   Ekateirna Verdin is a 46 y.o. female.     No chief complaint on file.    Chief complaint  Fatigue  Weight gain  B12 deficiency    History of Present Illness:    46-year-old female presenting today with complaint of chronic fatigue.  She is on multiple vitamin supplements.  Frequently misses her B12 injection.  Would like to discuss vitamin supplementation that would help give her some energy.  She has returned to work full-time and states that she is very tired.    Weight gain-gradual weight gain during pandemic.  Patient took a voluntary leave of absence for several months which left her at home and not as active as usual.  Today's weight 170 pounds with a BMI of 31.09.  Patient is not a candidate for Adipex as she has had a previous MI.  Has failed multiple conservative diets such as calorie counting, weight watchers and low-carb. Would like to discuss starting Lipo-mirna injections and Be-Thin spray.       The following portions of the patient's history and ROS were reviewed and updated as appropriate per provider:  Allergies, current medications, past family history, past medical history, past social history, past surgical history and problem list.    Review of Systems    Objective     /80   Pulse 88   Temp 96.6 °F (35.9 °C) (Tympanic)   Ht 157.5 cm (62\")   Wt 77.1 kg (170 lb)   SpO2 96%   BMI 31.09 kg/m²   Office Visit on 02/02/2021   Component Date Value Ref Range Status   • Glucose 02/02/2021 85  65 - 99 mg/dL Final   • BUN 02/02/2021 10  6 - 20 mg/dL Final   • Creatinine 02/02/2021 0.99  0.57 - 1.00 mg/dL Final   • Sodium 02/02/2021 133* 136 - 145 mmol/L Final   • Potassium 02/02/2021 4.3  3.5 - 5.2 mmol/L Final   • Chloride 02/02/2021 99  98 - 107 mmol/L Final   • CO2 02/02/2021 23.0  22.0 - 29.0 mmol/L Final   • Calcium 02/02/2021 8.9  8.6 - 10.5 mg/dL Final   • Total Protein 02/02/2021 7.0  6.0 - 8.5 g/dL Final   • Albumin 02/02/2021 4.10  3.50 - 5.20 g/dL Final   • ALT (SGPT) 02/02/2021 " 29  1 - 33 U/L Final   • AST (SGOT) 02/02/2021 22  1 - 32 U/L Final   • Alkaline Phosphatase 02/02/2021 57  39 - 117 U/L Final   • Total Bilirubin 02/02/2021 0.2  0.0 - 1.2 mg/dL Final   • eGFR Non African Amer 02/02/2021 60* >60 mL/min/1.73 Final   • Globulin 02/02/2021 2.9  gm/dL Final   • A/G Ratio 02/02/2021 1.4  g/dL Final   • BUN/Creatinine Ratio 02/02/2021 10.1  7.0 - 25.0 Final   • Anion Gap 02/02/2021 11.0  5.0 - 15.0 mmol/L Final   • TSH 02/02/2021 3.450  0.270 - 4.200 uIU/mL Final   • Hemoglobin A1C 02/02/2021 5.60  4.80 - 5.60 % Final   • 25 Hydroxy, Vitamin D 02/02/2021 14.0* 30.0 - 100.0 ng/ml Final   • Vitamin B-12 02/02/2021 337  211 - 946 pg/mL Final   • Total Cholesterol 02/02/2021 254* 0 - 200 mg/dL Final   • Triglycerides 02/02/2021 628* 0 - 150 mg/dL Final   • HDL Cholesterol 02/02/2021 28* 40 - 60 mg/dL Final   • LDL Cholesterol  02/02/2021 114* 0 - 100 mg/dL Final   • VLDL Cholesterol 02/02/2021 112* 5 - 40 mg/dL Final   • LDL/HDL Ratio 02/02/2021 3.59   Final   • Microalbumin, Urine 02/02/2021 <1.2  mg/dL Final   • Color, UA 02/02/2021 Yellow  Yellow, Straw Final   • Appearance, UA 02/02/2021 Cloudy* Clear Final   • pH, UA 02/02/2021 5.5  5.0 - 8.0 Final   • Specific Gravity, UA 02/02/2021 1.017  1.005 - 1.030 Final   • Glucose, UA 02/02/2021 Negative  Negative Final   • Ketones, UA 02/02/2021 Negative  Negative Final   • Bilirubin, UA 02/02/2021 Negative  Negative Final   • Blood, UA 02/02/2021 Trace* Negative Final   • Protein, UA 02/02/2021 Negative  Negative Final   • Leuk Esterase, UA 02/02/2021 Negative  Negative Final   • Nitrite, UA 02/02/2021 Negative  Negative Final   • Urobilinogen, UA 02/02/2021 0.2 E.U./dL  0.2 - 1.0 E.U./dL Final   • WBC 02/02/2021 5.76  3.40 - 10.80 10*3/mm3 Final   • RBC 02/02/2021 4.32  3.77 - 5.28 10*6/mm3 Final   • Hemoglobin 02/02/2021 13.2  12.0 - 15.9 g/dL Final   • Hematocrit 02/02/2021 37.7  34.0 - 46.6 % Final   • MCV 02/02/2021 87.3  79.0 - 97.0 fL  Final   • MCH 02/02/2021 30.6  26.6 - 33.0 pg Final   • MCHC 02/02/2021 35.0  31.5 - 35.7 g/dL Final   • RDW 02/02/2021 13.2  12.3 - 15.4 % Final   • RDW-SD 02/02/2021 41.3  37.0 - 54.0 fl Final   • MPV 02/02/2021 12.2* 6.0 - 12.0 fL Final   • Platelets 02/02/2021 286  140 - 450 10*3/mm3 Final   • Neutrophil % 02/02/2021 55.2  42.7 - 76.0 % Final   • Lymphocyte % 02/02/2021 34.0  19.6 - 45.3 % Final   • Monocyte % 02/02/2021 7.5  5.0 - 12.0 % Final   • Eosinophil % 02/02/2021 1.6  0.3 - 6.2 % Final   • Basophil % 02/02/2021 1.0  0.0 - 1.5 % Final   • Immature Grans % 02/02/2021 0.7* 0.0 - 0.5 % Final   • Neutrophils, Absolute 02/02/2021 3.18  1.70 - 7.00 10*3/mm3 Final   • Lymphocytes, Absolute 02/02/2021 1.96  0.70 - 3.10 10*3/mm3 Final   • Monocytes, Absolute 02/02/2021 0.43  0.10 - 0.90 10*3/mm3 Final   • Eosinophils, Absolute 02/02/2021 0.09  0.00 - 0.40 10*3/mm3 Final   • Basophils, Absolute 02/02/2021 0.06  0.00 - 0.20 10*3/mm3 Final   • Immature Grans, Absolute 02/02/2021 0.04  0.00 - 0.05 10*3/mm3 Final   • nRBC 02/02/2021 0.0  0.0 - 0.2 /100 WBC Final   • RBC, UA 02/02/2021 0-2  None Seen, 0-2 /HPF Final   • WBC, UA 02/02/2021 0-2  None Seen, 0-2 /HPF Final   • Bacteria, UA 02/02/2021 None Seen  None Seen /HPF Final   • Squamous Epithelial Cells, UA 02/02/2021 13-20* None Seen, 0-2 /HPF Final   • Hyaline Casts, UA 02/02/2021 0-2  None Seen /LPF Final   • Methodology 02/02/2021 Automated Microscopy   Final       Physical Exam  Vitals signs reviewed.   Constitutional:       General: She is not in acute distress.     Appearance: Normal appearance. She is well-developed. She is not ill-appearing, toxic-appearing or diaphoretic.      Comments: Visibly obese.    HENT:      Head: Normocephalic and atraumatic. Hair is normal.      Right Ear: Tympanic membrane, ear canal and external ear normal.      Left Ear: Tympanic membrane, ear canal and external ear normal.      Nose: Nose normal.      Right Sinus: No  maxillary sinus tenderness or frontal sinus tenderness.      Left Sinus: No maxillary sinus tenderness or frontal sinus tenderness.      Mouth/Throat:      Lips: Pink. No lesions.      Pharynx: No oropharyngeal exudate.   Eyes:      General: Lids are normal. Vision grossly intact. Gaze aligned appropriately.         Right eye: No discharge.         Left eye: No discharge.      Conjunctiva/sclera: Conjunctivae normal.      Pupils: Pupils are equal, round, and reactive to light.   Neck:      Musculoskeletal: Normal range of motion and neck supple. Normal range of motion. No spinous process tenderness or muscular tenderness.      Thyroid: No thyromegaly.      Trachea: No tracheal deviation.   Cardiovascular:      Rate and Rhythm: Normal rate and regular rhythm.      Pulses: Normal pulses.      Heart sounds: Normal heart sounds. No murmur. No friction rub. No gallop.    Pulmonary:      Effort: Pulmonary effort is normal. No accessory muscle usage or respiratory distress.      Breath sounds: Normal breath sounds. No wheezing or rales.   Chest:      Chest wall: No tenderness.   Abdominal:      General: Bowel sounds are normal. There is no distension.      Palpations: Abdomen is soft. There is no mass.      Tenderness: There is no abdominal tenderness. There is no guarding or rebound.   Musculoskeletal: Normal range of motion.   Lymphadenopathy:      Cervical: No cervical adenopathy.   Skin:     General: Skin is warm and dry.      Capillary Refill: Capillary refill takes less than 2 seconds.      Coloration: Skin is not cyanotic or pale.      Findings: No erythema or rash.      Nails: There is no clubbing.     Neurological:      General: No focal deficit present.      Mental Status: She is alert and oriented to person, place, and time.      Deep Tendon Reflexes: Reflexes are normal and symmetric.      Comments: CN 2-12 grossly intact    Psychiatric:         Attention and Perception: Attention normal.         Mood and  Affect: Mood normal.         Speech: Speech normal.         Behavior: Behavior normal. Behavior is cooperative.         Thought Content: Thought content normal.         Cognition and Memory: Cognition normal.         Judgment: Judgment normal.         Assessment/Plan     Problem List Items Addressed This Visit        Endocrine and Metabolic    Vitamin B 12 deficiency (Chronic)    Overview     Monthly injection per standing order         Current Assessment & Plan     I recommend Lipo- Jordin injections for diet supplement and fatigue. Right deltoid , tolerated well. Pt supplied medication.   Recommend Be-Thin sublingual spray for additional vitamin support and energy.     Reviewed possible side effects with pt stating understanding.            Obesity (BMI 30-39.9) - Primary (Chronic)    Current Assessment & Plan     Patient's (Body mass index is 31.09 kg/m².) indicates that they are obese (BMI >30) with obesity-related health conditions that include hypertension, coronary heart disease and dyslipidemias . Obesity is unchanged. BMI is is above average; BMI management plan is completed. We discussed portion control, increasing exercise and pharmacologic options including Be-Thin, Lipo-jordin injections . .                    I have reviewed medication list and discussed with patient the possible side effects and interactions.  We have discussed purpose for medication, route, dosage, frequency.  Refill routine medications.    Recent labs reviewed and discussed with patient.    Pt will be compliant with her cholesterol medication as she had not been taking. Will reassess lipids in about 12 weeks and adjust if needed.     Pt has been instructed today regarding low fat heart smart diet. Weight management and routine exercise has been recommended. Avoid high fat foods, starchy foods and processed foods. Increase lean meats, fresh vegetables and fresh fruits.          Patient's Body mass index is 31.09 kg/m². BMI is above  normal parameters. Recommendations include: exercise counseling, nutrition counseling and pharmacological intervention.    Lipo Jordin injection to right deltoid, pt supplied , tolerated well.     Addend: Lipo-jordin CPD: 12/8/2020, lot [id number], BUD: 6/6/2021    I have discussed diagnosis in detail today allowing time for questions and answers. Patient is aware of reasons to seek urgent or emergent medical care as well as reasons to return to the clinic for evaluation. Possible side effects, interactions and progression of symptoms discussed as well. Patient / family states understanding.   Emotional support and active listening provided.     Weekly Lipo-Jordin injections.     Follow up in one month, sooner if needed. Routine labs every 3-6 months.           This document has been electronically signed by:  ILEANA James, NP-C

## 2021-02-28 PROBLEM — R53.82 CHRONIC FATIGUE: Chronic | Status: ACTIVE | Noted: 2021-02-28

## 2021-02-28 PROBLEM — E53.8 VITAMIN B 12 DEFICIENCY: Chronic | Status: ACTIVE | Noted: 2019-02-20

## 2021-02-28 NOTE — ASSESSMENT & PLAN NOTE
Patient's (Body mass index is 31.09 kg/m².) indicates that they are obese (BMI >30) with obesity-related health conditions that include hypertension, coronary heart disease and dyslipidemias . Obesity is unchanged. BMI is is above average; BMI management plan is completed. We discussed portion control, increasing exercise and pharmacologic options including Be-Thin, Lipo-mirna injections . .

## 2021-02-28 NOTE — ASSESSMENT & PLAN NOTE
I recommend Lipo- Jordin injections for diet supplement and fatigue. Right deltoid , tolerated well. Pt supplied medication.   Recommend Be-Thin sublingual spray for additional vitamin support and energy.     Reviewed possible side effects with pt stating understanding.

## 2021-03-16 ENCOUNTER — BULK ORDERING (OUTPATIENT)
Dept: CASE MANAGEMENT | Facility: OTHER | Age: 46
End: 2021-03-16

## 2021-03-16 DIAGNOSIS — Z23 IMMUNIZATION DUE: ICD-10-CM

## 2021-04-05 RX ORDER — LISINOPRIL 5 MG/1
TABLET ORAL
Qty: 30 TABLET | Refills: 0 | Status: SHIPPED | OUTPATIENT
Start: 2021-04-05 | End: 2021-10-18 | Stop reason: DRUGHIGH

## 2021-04-19 ENCOUNTER — HOSPITAL ENCOUNTER (OUTPATIENT)
Facility: HOSPITAL | Age: 46
Setting detail: OBSERVATION
Discharge: LEFT BEFORE DISCHARGE | End: 2021-04-19
Attending: EMERGENCY MEDICINE | Admitting: INTERNAL MEDICINE

## 2021-04-19 ENCOUNTER — APPOINTMENT (OUTPATIENT)
Dept: GENERAL RADIOLOGY | Facility: HOSPITAL | Age: 46
End: 2021-04-19

## 2021-04-19 VITALS
TEMPERATURE: 98.5 F | RESPIRATION RATE: 20 BRPM | OXYGEN SATURATION: 98 % | HEART RATE: 71 BPM | WEIGHT: 164.8 LBS | DIASTOLIC BLOOD PRESSURE: 74 MMHG | BODY MASS INDEX: 31.11 KG/M2 | HEIGHT: 61 IN | SYSTOLIC BLOOD PRESSURE: 151 MMHG

## 2021-04-19 DIAGNOSIS — R07.9 CHEST PAIN, UNSPECIFIED TYPE: Primary | ICD-10-CM

## 2021-04-19 LAB
ALBUMIN SERPL-MCNC: 4.52 G/DL (ref 3.5–5.2)
ALBUMIN/GLOB SERPL: 1.5 G/DL
ALP SERPL-CCNC: 60 U/L (ref 39–117)
ALT SERPL W P-5'-P-CCNC: 14 U/L (ref 1–33)
ANION GAP SERPL CALCULATED.3IONS-SCNC: 8.6 MMOL/L (ref 5–15)
AST SERPL-CCNC: 15 U/L (ref 1–32)
BASOPHILS # BLD AUTO: 0.06 10*3/MM3 (ref 0–0.2)
BASOPHILS NFR BLD AUTO: 1 % (ref 0–1.5)
BILIRUB SERPL-MCNC: 0.3 MG/DL (ref 0–1.2)
BUN SERPL-MCNC: 9 MG/DL (ref 6–20)
BUN/CREAT SERPL: 9.4 (ref 7–25)
CALCIUM SPEC-SCNC: 9.8 MG/DL (ref 8.6–10.5)
CHLORIDE SERPL-SCNC: 102 MMOL/L (ref 98–107)
CO2 SERPL-SCNC: 28.4 MMOL/L (ref 22–29)
CREAT SERPL-MCNC: 0.96 MG/DL (ref 0.57–1)
DEPRECATED RDW RBC AUTO: 39.9 FL (ref 37–54)
EOSINOPHIL # BLD AUTO: 0.04 10*3/MM3 (ref 0–0.4)
EOSINOPHIL NFR BLD AUTO: 0.7 % (ref 0.3–6.2)
ERYTHROCYTE [DISTWIDTH] IN BLOOD BY AUTOMATED COUNT: 12 % (ref 12.3–15.4)
FLUAV RNA RESP QL NAA+PROBE: NOT DETECTED
FLUBV RNA RESP QL NAA+PROBE: NOT DETECTED
GFR SERPL CREATININE-BSD FRML MDRD: 62 ML/MIN/1.73
GLOBULIN UR ELPH-MCNC: 3 GM/DL
GLUCOSE SERPL-MCNC: 92 MG/DL (ref 65–99)
HCT VFR BLD AUTO: 41 % (ref 34–46.6)
HGB BLD-MCNC: 13.6 G/DL (ref 12–15.9)
HOLD SPECIMEN: NORMAL
HOLD SPECIMEN: NORMAL
IMM GRANULOCYTES # BLD AUTO: 0.02 10*3/MM3 (ref 0–0.05)
IMM GRANULOCYTES NFR BLD AUTO: 0.3 % (ref 0–0.5)
LYMPHOCYTES # BLD AUTO: 2.09 10*3/MM3 (ref 0.7–3.1)
LYMPHOCYTES NFR BLD AUTO: 34.4 % (ref 19.6–45.3)
MCH RBC QN AUTO: 30.1 PG (ref 26.6–33)
MCHC RBC AUTO-ENTMCNC: 33.2 G/DL (ref 31.5–35.7)
MCV RBC AUTO: 90.7 FL (ref 79–97)
MONOCYTES # BLD AUTO: 0.54 10*3/MM3 (ref 0.1–0.9)
MONOCYTES NFR BLD AUTO: 8.9 % (ref 5–12)
NEUTROPHILS NFR BLD AUTO: 3.32 10*3/MM3 (ref 1.7–7)
NEUTROPHILS NFR BLD AUTO: 54.7 % (ref 42.7–76)
NRBC BLD AUTO-RTO: 0 /100 WBC (ref 0–0.2)
PLATELET # BLD AUTO: 272 10*3/MM3 (ref 140–450)
PMV BLD AUTO: 11 FL (ref 6–12)
POTASSIUM SERPL-SCNC: 3.8 MMOL/L (ref 3.5–5.2)
PROT SERPL-MCNC: 7.5 G/DL (ref 6–8.5)
QT INTERVAL: 400 MS
QTC INTERVAL: 428 MS
RBC # BLD AUTO: 4.52 10*6/MM3 (ref 3.77–5.28)
SARS-COV-2 RNA RESP QL NAA+PROBE: NOT DETECTED
SODIUM SERPL-SCNC: 139 MMOL/L (ref 136–145)
TROPONIN T SERPL-MCNC: <0.01 NG/ML (ref 0–0.03)
TROPONIN T SERPL-MCNC: <0.01 NG/ML (ref 0–0.03)
WBC # BLD AUTO: 6.07 10*3/MM3 (ref 3.4–10.8)
WHOLE BLOOD HOLD SPECIMEN: NORMAL
WHOLE BLOOD HOLD SPECIMEN: NORMAL

## 2021-04-19 PROCEDURE — G0378 HOSPITAL OBSERVATION PER HR: HCPCS

## 2021-04-19 PROCEDURE — 84484 ASSAY OF TROPONIN QUANT: CPT | Performed by: PHYSICIAN ASSISTANT

## 2021-04-19 PROCEDURE — 99284 EMERGENCY DEPT VISIT MOD MDM: CPT

## 2021-04-19 PROCEDURE — 87636 SARSCOV2 & INF A&B AMP PRB: CPT | Performed by: PHYSICIAN ASSISTANT

## 2021-04-19 PROCEDURE — C9803 HOPD COVID-19 SPEC COLLECT: HCPCS

## 2021-04-19 PROCEDURE — 71045 X-RAY EXAM CHEST 1 VIEW: CPT

## 2021-04-19 PROCEDURE — 80053 COMPREHEN METABOLIC PANEL: CPT | Performed by: PHYSICIAN ASSISTANT

## 2021-04-19 PROCEDURE — 99220 PR INITIAL OBSERVATION CARE/DAY 70 MINUTES: CPT | Performed by: INTERNAL MEDICINE

## 2021-04-19 PROCEDURE — 93005 ELECTROCARDIOGRAM TRACING: CPT | Performed by: EMERGENCY MEDICINE

## 2021-04-19 PROCEDURE — 71045 X-RAY EXAM CHEST 1 VIEW: CPT | Performed by: RADIOLOGY

## 2021-04-19 PROCEDURE — 85025 COMPLETE CBC W/AUTO DIFF WBC: CPT | Performed by: PHYSICIAN ASSISTANT

## 2021-04-19 RX ORDER — ERGOCALCIFEROL 1.25 MG/1
50000 CAPSULE ORAL WEEKLY
COMMUNITY
Start: 2021-04-21 | End: 2022-06-29 | Stop reason: SDUPTHER

## 2021-04-19 RX ORDER — ROSUVASTATIN CALCIUM 20 MG/1
20 TABLET, COATED ORAL DAILY
Status: CANCELLED | OUTPATIENT
Start: 2021-04-20

## 2021-04-19 RX ORDER — ASPIRIN 325 MG
325 TABLET, DELAYED RELEASE (ENTERIC COATED) ORAL DAILY
Status: CANCELLED | OUTPATIENT
Start: 2021-04-20

## 2021-04-19 RX ORDER — NITROGLYCERIN 0.4 MG/1
0.4 TABLET SUBLINGUAL
Status: DISCONTINUED | OUTPATIENT
Start: 2021-04-19 | End: 2021-04-19 | Stop reason: HOSPADM

## 2021-04-19 RX ORDER — BUPROPION HYDROCHLORIDE 150 MG/1
300 TABLET ORAL EVERY MORNING
Status: CANCELLED | OUTPATIENT
Start: 2021-04-20

## 2021-04-19 RX ORDER — CLOPIDOGREL BISULFATE 75 MG/1
75 TABLET ORAL DAILY
Status: CANCELLED | OUTPATIENT
Start: 2021-04-20

## 2021-04-19 RX ORDER — LISINOPRIL 2.5 MG/1
5 TABLET ORAL DAILY
Status: CANCELLED | OUTPATIENT
Start: 2021-04-20

## 2021-04-19 RX ORDER — ASPIRIN 325 MG
325 TABLET, DELAYED RELEASE (ENTERIC COATED) ORAL DAILY
COMMUNITY
End: 2021-10-18 | Stop reason: DRUGHIGH

## 2021-04-19 RX ORDER — PANTOPRAZOLE SODIUM 40 MG/1
40 TABLET, DELAYED RELEASE ORAL EVERY MORNING
Refills: 2 | Status: CANCELLED | OUTPATIENT
Start: 2021-04-20

## 2021-04-19 RX ORDER — SODIUM CHLORIDE 0.9 % (FLUSH) 0.9 %
10 SYRINGE (ML) INJECTION AS NEEDED
Status: DISCONTINUED | OUTPATIENT
Start: 2021-04-19 | End: 2021-04-19 | Stop reason: HOSPADM

## 2021-04-19 RX ORDER — HEPARIN SODIUM 5000 [USP'U]/ML
5000 INJECTION, SOLUTION INTRAVENOUS; SUBCUTANEOUS EVERY 8 HOURS SCHEDULED
Status: DISCONTINUED | OUTPATIENT
Start: 2021-04-19 | End: 2021-04-19 | Stop reason: HOSPADM

## 2021-04-19 RX ORDER — ASPIRIN 81 MG/1
324 TABLET, CHEWABLE ORAL ONCE
Status: DISCONTINUED | OUTPATIENT
Start: 2021-04-19 | End: 2021-04-19 | Stop reason: HOSPADM

## 2021-04-19 RX ORDER — SODIUM CHLORIDE 0.9 % (FLUSH) 0.9 %
10 SYRINGE (ML) INJECTION EVERY 12 HOURS SCHEDULED
Status: DISCONTINUED | OUTPATIENT
Start: 2021-04-19 | End: 2021-04-19 | Stop reason: HOSPADM

## 2021-04-19 NOTE — PROGRESS NOTES
Discharge Planning Assessment   [place]     Patient Name: Ekaterina Nelson  MRN: [id number]  Today's Date: 4/19/2021    Admit Date: 4/19/2021    Discharge Needs Assessment     Row Name 04/19/21 1496       Living Environment    Lives With  spouse    Current Living Arrangements  home/apartment/condo    Primary Care Provided by  self    Family Caregiver if Needed  spouse    Quality of Family Relationships  helpful;involved;supportive    Able to Return to Prior Arrangements  yes       Resource/Environmental Concerns    Resource/Environmental Concerns  none       Transition Planning    Patient/Family Anticipates Transition to  home with family    Patient/Family Anticipated Services at Transition  none    Transportation Anticipated  family or friend will provide       Discharge Needs Assessment    Readmission Within the Last 30 Days  no previous admission in last 30 days    Equipment Currently Used at Home  none    Concerns to be Addressed  no discharge needs identified;denies needs/concerns at this time    Anticipated Changes Related to Illness  none    Equipment Needed After Discharge  none        Discharge Plan     Row Name 04/19/21 5519       Plan    Plan  Pt lives at home with spouse Michel who is at bedside and plans to return home upon discharge, family to provide transportation. Pcp is Ashley Alejandre, she uses [facility] in [place] and [facility]. Pt denies any issues with meds or copays. Pt is independent with adl's, and does not use any dme, home o2 or home health. Denies any needs at this time.    Patient/Family in Agreement with Plan  yes        Continued Care and Services - Admitted Since 4/19/2021    Coordination has not been started for this encounter.         Demographic Summary     Row Name 04/19/21 1619       General Information    Admission Type  observation    Arrived From  home    Referral Source  emergency department    Reason for Consult  discharge planning    Preferred Language   English        Functional Status     Row Name 04/19/21 1620       Functional Status    Usual Activity Tolerance  good    Current Activity Tolerance  good       Mental Status    General Appearance WDL  WDL        Psychosocial     Row Name 04/19/21 1620       Behavior WDL    Behavior WDL  WDL       Emotion Mood WDL    Emotion/Mood/Affect WDL  WDL       Speech WDL    Speech WDL  WDL        Abuse/Neglect    No documentation.       Legal    No documentation.       Substance Abuse    No documentation.       Patient Forms    No documentation.           Alison Coreas RN

## 2021-04-19 NOTE — ED PROVIDER NOTES
Subjective   46-year-old female presents secondary to chest pain.  Patient states that this has been intermittently worsening since last week.  She states that in the beginning it was with exertion.  At this time she has had episodes at rest.  She states is a pressure in the center of her chest.  She has had some pain into her left arm.  She has had episodes of shortness of breath with this.  She does have a history of cardiac disease.  She had 2 stents in 2016.  She denies any current complaint.  She states that her pain was earlier today around 830.          Review of Systems   Constitutional: Negative.  Negative for fever.   HENT: Negative.    Respiratory: Positive for chest tightness and shortness of breath.    Cardiovascular: Negative.  Negative for chest pain.   Gastrointestinal: Negative.  Negative for abdominal pain.   Endocrine: Negative.    Genitourinary: Negative.  Negative for dysuria.   Skin: Negative.    Neurological: Negative.    Psychiatric/Behavioral: Negative.    All other systems reviewed and are negative.      Past Medical History:   Diagnosis Date   • Hyperlipidemia    • Mitral valve prolapse    • Overweight (BMI 25.0-29.9) 10/19/2016   • PCOS (polycystic ovarian syndrome)        Allergies   Allergen Reactions   • Codeine Shortness Of Breath       Past Surgical History:   Procedure Laterality Date   • CARDIAC CATHETERIZATION N/A 9/29/2016    Procedure: Left Heart Cath;  Surgeon: Scott Aponte MD;  Location:  [facility] CATH [facility]VASIVE LOCATION;  Service:    • CHOLECYSTECTOMY     • ENDOMETRIAL ABLATION     • NM RT/LT HEART CATHETERS N/A 9/29/2016    Procedure: Percutaneous Coronary Intervention;  Surgeon: Scott Aponte MD;  Location:  [facility] CATH INVASIVE LOCATION;  Service: Cardiovascular   • TUBAL ABDOMINAL LIGATION         Family History   Problem Relation Age of Onset   • COPD Mother    • Heart disease Mother    • Hyperlipidemia Mother    • Hypertension Mother    • Thyroid disease Mother    •  Arthritis Father    • Cancer Father    • Heart disease Father    • Hyperlipidemia Father    • Hypertension Father    • Heart disease Maternal Aunt    • Heart disease Maternal Uncle    • Heart disease Maternal Grandmother    • Stroke Maternal Grandmother    • Heart disease Maternal Grandfather    • Diabetes Paternal Grandfather        Social History     Socioeconomic History   • Marital status:      Spouse name: Not on file   • Number of children: Not on file   • Years of education: Not on file   • Highest education level: Not on file   Tobacco Use   • Smoking status: Former Smoker     Packs/day: 1.00     Years: 23.00     Pack years: 23.00     Types: Cigarettes     Quit date: 2016     Years since quittin.6   • Smokeless tobacco: Never Used   Substance and Sexual Activity   • Alcohol use: No   • Drug use: No   • Sexual activity: Defer           Objective   Physical Exam  Vitals and nursing note reviewed.   Constitutional:       General: She is not in acute distress.     Appearance: She is well-developed. She is not diaphoretic.   HENT:      Head: Normocephalic and atraumatic.      Right Ear: External ear normal.      Left Ear: External ear normal.      Nose: Nose normal.   Eyes:      Conjunctiva/sclera: Conjunctivae normal.      Pupils: Pupils are equal, round, and reactive to light.   Neck:      Vascular: No JVD.      Trachea: No tracheal deviation.   Cardiovascular:      Rate and Rhythm: Normal rate and regular rhythm.      Heart sounds: Normal heart sounds. No murmur heard.     Pulmonary:      Effort: Pulmonary effort is normal. No respiratory distress.      Breath sounds: Normal breath sounds. No wheezing.   Abdominal:      General: Bowel sounds are normal.      Palpations: Abdomen is soft.      Tenderness: There is no abdominal tenderness.   Musculoskeletal:         General: No deformity. Normal range of motion.      Cervical back: Normal range of motion and neck supple.   Skin:     General: Skin  is warm and dry.      Coloration: Skin is not pale.      Findings: No erythema or rash.   Neurological:      Mental Status: She is alert and oriented to person, place, and time.      Cranial Nerves: No cranial nerve deficit.   Psychiatric:         Behavior: Behavior normal.         Thought Content: Thought content normal.         Procedures           ED Course  ED Course as of Apr 19 1945   Mon Apr 19, 2021   1426 EKG at 1423 shows sinus rhythm, rate 69.  CA interval 126, QRS duration 86, QTc 428 ms.  Some baseline artifact.  No apparent acute ischemia.  No evidence for STEMI.    [CM]   1608 Paged hospitalist.  For set of heart enzymes normal.  Patient has crescendo angina over the past few days.  Patient states that she typically does not have chest pain.  She is status post 2 stents after MI in 2016.  Heart score is 5    [JI]      ED Course User Index  [CM] Luigi Feng MD  [JI] David Huggins PA                                           MDM  Number of Diagnoses or Management Options  Chest pain, unspecified type: new and requires workup     Amount and/or Complexity of Data Reviewed  Clinical lab tests: reviewed and ordered  Tests in the radiology section of CPT®: reviewed and ordered  Tests in the medicine section of CPT®: reviewed and ordered  Discuss the patient with other providers: yes  Independent visualization of images, tracings, or specimens: yes    Risk of Complications, Morbidity, and/or Mortality  Presenting problems: moderate        Final diagnoses:   Chest pain, unspecified type       ED Disposition  ED Disposition     ED Disposition Condition Comment    Decision to Admit  Level of Care: Telemetry [5]   Diagnosis: Chest pain [376136]   Admitting Physician: ZAKI HART [[id number]]            No follow-up provider specified.       Medication List      No changes were made to your prescriptions during this visit.          David Huggins PA  04/19/21 1945

## 2021-04-19 NOTE — ED NOTES
ekg completed per tech cyrstal at this time, taken to provider per Jessica Vann, RN  04/19/21 9897

## 2021-04-19 NOTE — ED NOTES
CARDIAC STENTS PLACED IN 2016 AT [facility]     Kiesha Guajardo RN  04/19/21 6309

## 2021-04-19 NOTE — H&P
[facility] Medicine Services  History & Physical          Patient Identification:  Name:  Ekaterina Nelson  Age:  46 y.o.  Sex:  female  :  1975  MRN:  [id number]   Admit Date: 2021   Visit Number:  [id number]  Primary Care Physician:  Ashley Alejandre APRN    I have seen the patient in conjunction with ILEANA Foy and I agree with the following statements:     Subjective     Chief complaint: chest pressure    History of presenting illness:    Mrs. Nelson is a 46 year old female patient that presented to [facility] ED on 21. She states she has been having left arm aching for the last month. She states last week she developed some chest discomfort. 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 She states her blood pressure was 170/98, she decided to go ahead and take her morning medications which she usually takes around lunch. She states she had some shortness of breath which felt mostly like the sensation she couldn't take a deep breath, she also had some diaphoresis. She decided to come to the ER and be evaluated, she states the pain just subsided slowly on it's own. She also reports having some dizziness the last few weeks, she reports she does have vertigo and feels like it could be associated with her blood pressure at times. She denies any syncope. She states over the last 3 weeks she has been having a lot of issues with her ex  regarding her children. She is in no distress on my exam and pain free.     Her treatment in the ER included ASA 324mg PO x1. Her v/s in the ED were temp 98.1, HR 58-65, RR 20, //78.     Her past medical history is significant for CAD s/p stenting x2 2016, PCOS, HTN, Hyperlipidemia. She denies any tobacco, drug or alcohol use. She does have a strong family history of heart disease, her her mother has had bypass and stents placed, she has two uncles who have   of MI's in the late 30's and 40s, Her Maternal GF had MI, her father has stents, and 2 aunts  of MI all under the age of 50. She does not have a cardiologist she see's, last seen was Dr. Aparicio.   ---------------------------------------------------------------------------------------------------------------------   Review of Systems   Constitutional: Negative for chills, fatigue and fever.   HENT: Negative for congestion and rhinorrhea.    Respiratory: Positive for shortness of breath. Negative for cough.    Cardiovascular: Negative for chest pain.   Gastrointestinal: Negative for diarrhea, nausea and vomiting.   Genitourinary: Negative for difficulty urinating.   Musculoskeletal: Negative for arthralgias and myalgias.   Neurological: Positive for dizziness and light-headedness. Negative for weakness.      ---------------------------------------------------------------------------------------------------------------------   Past Medical History:   Diagnosis Date   • Hyperlipidemia    • Mitral valve prolapse    • Overweight (BMI 25.0-29.9) 10/19/2016   • PCOS (polycystic ovarian syndrome)      Past Surgical History:   Procedure Laterality Date   • CARDIAC CATHETERIZATION N/A 2016    Procedure: Left Heart Cath;  Surgeon: Scott Aponte MD;  Location: [facility] CATH INVASIVE LOCATION;  Service:    • CHOLECYSTECTOMY     • ENDOMETRIAL ABLATION     • UT RT/LT HEART CATHETERS N/A 2016    Procedure: Percutaneous Coronary Intervention;  Surgeon: Scott Aponte MD;  Location: [facility] INVASIVE LOCATION;  Service: Cardiovascular   • TUBAL ABDOMINAL LIGATION       Family History   Problem Relation Age of Onset   • COPD Mother    • Heart disease Mother    • Hyperlipidemia Mother    • Hypertension Mother    • Thyroid disease Mother    • Arthritis Father    • Cancer Father    • Heart disease Father    • Hyperlipidemia Father    • Hypertension Father    • Heart disease Maternal Aunt    • Heart disease  Maternal Uncle    • Heart disease Maternal Grandmother    • Stroke Maternal Grandmother    • Heart disease Maternal Grandfather    • Diabetes Paternal Grandfather      Social History     Socioeconomic History   • Marital status:      Spouse name: Not on file   • Number of children: Not on file   • Years of education: Not on file   • Highest education level: Not on file   Tobacco Use   • Smoking status: Former Smoker     Packs/day: 1.00     Years: 23.00     Pack years: 23.00     Types: Cigarettes     Quit date: 2016     Years since quittin.6   • Smokeless tobacco: Never Used   Substance and Sexual Activity   • Alcohol use: No   • Drug use: No   • Sexual activity: Defer     ---------------------------------------------------------------------------------------------------------------------   Allergies:  Codeine  ---------------------------------------------------------------------------------------------------------------------     Medications below are reported home medications pulling from within the system; at this time, these medications have not been reconciled unless otherwise specified and are in the verification process for further verifcation as current home medications.    Prior to Admission Medications     Prescriptions Last Dose Informant Patient Reported? Taking?    buPROPion XL (WELLBUTRIN XL) 300 MG 24 hr tablet   No No    Take 1 tablet by mouth Every Morning.    clopidogrel (PLAVIX) 75 MG tablet   No No    Take 1 tablet by mouth Daily.    cyanocobalamin 1000 MCG/ML injection   No No    Inject 1 mL into the appropriate muscle as directed by prescriber Every 28 (Twenty-Eight) Days. Given at home    cyanocobalamin injection 1,000 mcg   No No    diclofenac (VOLTAREN) 1 % gel gel   No No    Apply 4 g topically to the appropriate area as directed 4 (Four) Times a Day As Needed (left shoulder).    furosemide (LASIX) 20 MG tablet   No No    Take 1 tablet by mouth Daily As Needed (swelling).     icosapent ethyl (Vascepa) 1 g capsule capsule   No No    2 twice daily    lisinopril (PRINIVIL,ZESTRIL) 5 MG tablet   No No    Take 1 tablet by mouth once daily    loratadine (CLARITIN) 10 MG tablet   No No    Take 1 tablet by mouth Daily As Needed for Allergies.    meclizine (ANTIVERT) 25 MG tablet   No No    Take 1 tablet by mouth 3 (Three) Times a Day As Needed for Dizziness.    metoprolol succinate XL (TOPROL-XL) 25 MG 24 hr tablet   No No    Take 0.5 tablets by mouth Daily.    nitroglycerin (Nitrostat) 0.3 MG SL tablet   No No    Place 1 tablet under the tongue Every 5 (Five) Minutes As Needed for Chest Pain. Take no more than 3 doses in 15 minutes.    omeprazole (priLOSEC) 40 MG capsule   No No    Take 1 capsule by mouth Daily.    potassium chloride (K-DUR,KLOR-CON) 10 MEQ CR tablet   No No    Take 1 tablet by mouth Daily. Take only when take lasix    rosuvastatin (CRESTOR) 20 MG tablet   No No    Take 1 tablet by mouth Daily.    vitamin D (ERGOCALCIFEROL) 1.25 MG (08072 UT) capsule capsule   No No    Take 1 capsule by mouth 1 (One) Time Per Week.    vitamin E (vitamin E) 400 UNIT capsule   No No    Take 1 capsule by mouth Daily.        Hospital Scheduled Meds:  aspirin, 324 mg, Oral, Once  cyanocobalamin, 1,000 mcg, Intramuscular, Q30 Days         ---------------------------------------------------------------------------------------------------------------------   Objective       Vital Signs:  Temp:  [98.1 °F (36.7 °C)] 98.1 °F (36.7 °C)  Heart Rate:  [58-65] 61  Resp:  [20] 20  BP: (134-164)/(74-87) 141/81      04/19/21  1431   Weight: 74.8 kg (165 lb)     Body mass index is 32.22 kg/m².  ---------------------------------------------------------------------------------------------------------------------       Physical Exam    Physical Exam:  Constitutional:  Well-developed and well-nourished.     HENT:  Head: Normocephalic and atraumatic.  Mouth:  Moist mucous membranes.    Neck:  Neck supple.      Cardiovascular:  Normal rate, regular rhythm.  with no murmur.  Pulmonary/Chest:  No respiratory distress, no wheezes, no crackles, with normal breath sounds and good air movement.  Abdominal:  Soft.  Bowel sounds are present.  No distension and no tenderness.   Musculoskeletal:  No edema, no tenderness, and no deformity.   Neurological:  Alert and oriented to person, place, and time.  No tongue deviation.  No facial droop.  No slurred speech.   Skin:  Skin is warm and dry.  No rash noted.  No pallor.   Psychiatric:  Normal mood and affect.  Behavior is normal.  Judgment and thought content normal.   Peripheral vascular:  No edema and strong pulses on all 4 extremities.  ---------------------------------------------------------------------------------------------------------------------  EKG:          ---------------------------------------------------------------------------------------------------------------------   Results from last 7 days   Lab Units 04/19/21  1446   WBC 10*3/mm3 6.07   HEMOGLOBIN g/dL 13.6   HEMATOCRIT % 41.0   MCV fL 90.7   MCHC g/dL 33.2   PLATELETS 10*3/mm3 272         Results from last 7 days   Lab Units 04/19/21  1446   SODIUM mmol/L 139   POTASSIUM mmol/L 3.8   CHLORIDE mmol/L 102   CO2 mmol/L 28.4   BUN mg/dL 9   CREATININE mg/dL 0.96   EGFR IF NONAFRICN AM mL/min/1.73 62   CALCIUM mg/dL 9.8   GLUCOSE mg/dL 92   ALBUMIN g/dL 4.52   BILIRUBIN mg/dL 0.3   ALK PHOS U/L 60   AST (SGOT) U/L 15   ALT (SGPT) U/L 14   Estimated Creatinine Clearance: 64.7 mL/min (by C-G formula based on SCr of 0.96 mg/dL).  No results found for: AMMONIA  Results from last 7 days   Lab Units 04/19/21  1446   TROPONIN T ng/mL <0.010         Lab Results   Component Value Date    HGBA1C 5.60 02/02/2021     Lab Results   Component Value Date    TSH 3.450 02/02/2021     Lab Results   Component Value Date    PREGTESTUR Negative 09/22/2014    HCGQUANT <2.00 06/26/2018     Pain Management Panel    There is no flowsheet  data to display.       No results found for: BLOODCX  No results found for: URINECX  No results found for: WOUNDCX  No results found for: STOOLCX      ---------------------------------------------------------------------------------------------------------------------  Imaging Results (Last 7 Days)     Procedure Component Value Units Date/Time    XR Chest 1 View [[id number]] Collected: 04/19/21 1511     Updated: 04/19/21 1603    Narrative:      XR CHEST 1 VW-     CLINICAL INDICATION: Chest pain protocol        COMPARISON: 10/04/2016      TECHNIQUE: Single frontal view of the chest.     FINDINGS:     There is no focal alveolar infiltrate or effusion.  The cardiac silhouette is normal. The pulmonary vasculature is  unremarkable.  There is no evidence of an acute osseous abnormality.   There are no suspicious-appearing parenchymal soft tissue nodules.          Impression:      No evidence of active or acute cardiopulmonary disease on today's chest  radiograph.     This report was finalized on 4/19/2021 3:11 PM by Dr. David Patricia MD.               ---------------------------------------------------------------------------------------------------------------------  Assessment / Plan       Assessment and Plan:    Chest pain  CAD s/p stenting x2, 9/2016  Hyperlipidemia  HTN  Dizziness   -COVID-19 pending  -chest xray on admission with no acute concerns  -on plavix, statin and aspirin at home and reports daily compliance   -she reports having her lipids checked recently and her triglycerides were 628, she states her PCP wanted to add a medication to her regimen but it was not approved by her insurance so her lipid management has not been further addressed since (appears to be vascepa)   -AM Lipid panel fasting  -Await home meds from pharmacy regarding BP meds  -Echocardiogram ordered  -trend troponin  -NPO after MN  -cardiology consulted given significant personal and family history   -Carotid US ordered  -Treadmill stress  test ordered for the am    Obesity, BMI 32.22    Activity: up with assistance   Diet: Cardiac, NPO after MN  DVT prophylaxis: Heparin TID SQ    Code status: Full      Disposition home when medically stable     Laura RADHA Mireles, APRN  04/19/21  16:41 EDT  ---------------------------------------------------------------------------------------------------------------------

## 2021-04-20 NOTE — PROGRESS NOTES
Patient left AMA once arriving to the floor. She reports she had a family emergency and left before a provider could speak with her. Will notify attending physician of patient's decision.

## 2021-04-20 NOTE — DISCHARGE SUMMARY
Discharge Summary:    Date of Admission: 4/19/2021  Date of Discharge: 4/19/2021    PCP: Ashley Alejandre APRN    Patient Left AGAINST MEDICAL ADVICE    DISCHARGE DIAGNOSIS    Chest pain      SECONDARY DIAGNOSES  Past Medical History:   Diagnosis Date   • Hyperlipidemia    • Mitral valve prolapse    • Overweight (BMI 25.0-29.9) 10/19/2016   • PCOS (polycystic ovarian syndrome)        HOSPITAL COURSE  Patient is a 46 y.o. female presented to [facility] complaining of chest pain.  Please see the admitting history and physical for further details.      Patient was seen in the emergency room and admitted to telemetry for further workup and evaluation of chest pain.     Patient reportedly left the hospital against medical advice shortly after arriving to the telemetry floor according to chart review due to family emergency.      DISCHARGE DISPOSITION   Left Against Medical Advice        Briana Meredith DO  04/20/21  18:41 EDT

## 2021-09-09 PROBLEM — U07.1 COVID-19: Status: ACTIVE | Noted: 2021-09-09

## 2021-09-09 RX ORDER — METHYLPREDNISOLONE SODIUM SUCCINATE 125 MG/2ML
125 INJECTION, POWDER, LYOPHILIZED, FOR SOLUTION INTRAMUSCULAR; INTRAVENOUS AS NEEDED
Status: CANCELLED | OUTPATIENT
Start: 2021-09-10

## 2021-09-09 RX ORDER — DIPHENHYDRAMINE HYDROCHLORIDE 50 MG/ML
50 INJECTION INTRAMUSCULAR; INTRAVENOUS AS NEEDED
Status: CANCELLED | OUTPATIENT
Start: 2021-09-10

## 2021-09-09 RX ORDER — EPINEPHRINE 1 MG/ML
0.3 INJECTION, SOLUTION INTRAMUSCULAR; SUBCUTANEOUS AS NEEDED
Status: CANCELLED | OUTPATIENT
Start: 2021-09-10

## 2021-09-10 ENCOUNTER — HOSPITAL ENCOUNTER (OUTPATIENT)
Dept: INFUSION THERAPY | Facility: HOSPITAL | Age: 46
Discharge: HOME | End: 2021-09-10
Admitting: NURSE PRACTITIONER

## 2021-09-10 VITALS
RESPIRATION RATE: 18 BRPM | HEART RATE: 59 BPM | TEMPERATURE: 97.7 F | OXYGEN SATURATION: 97 % | SYSTOLIC BLOOD PRESSURE: 131 MMHG | DIASTOLIC BLOOD PRESSURE: 70 MMHG

## 2021-09-10 DIAGNOSIS — U07.1 COVID-19: Primary | ICD-10-CM

## 2021-09-10 PROCEDURE — 25010000006 INJECTION, CASIRIVIMAB AND IMDEVIMAB, 1200 MG: Performed by: NURSE PRACTITIONER

## 2021-09-10 PROCEDURE — M0243 CASIRIVI AND IMDEVI INFUSION: HCPCS | Performed by: NURSE PRACTITIONER

## 2021-09-10 RX ORDER — METHYLPREDNISOLONE SODIUM SUCCINATE 125 MG/2ML
125 INJECTION, POWDER, LYOPHILIZED, FOR SOLUTION INTRAMUSCULAR; INTRAVENOUS AS NEEDED
Status: CANCELLED | OUTPATIENT
Start: 2021-09-10

## 2021-09-10 RX ORDER — EPINEPHRINE 1 MG/ML
0.3 INJECTION, SOLUTION INTRAMUSCULAR; SUBCUTANEOUS AS NEEDED
Status: CANCELLED | OUTPATIENT
Start: 2021-09-10

## 2021-09-10 RX ORDER — DIPHENHYDRAMINE HYDROCHLORIDE 50 MG/ML
50 INJECTION INTRAMUSCULAR; INTRAVENOUS AS NEEDED
Status: DISCONTINUED | OUTPATIENT
Start: 2021-09-10 | End: 2021-09-12 | Stop reason: HOSPADM

## 2021-09-10 RX ORDER — EPINEPHRINE 1 MG/ML
0.3 INJECTION, SOLUTION INTRAMUSCULAR; SUBCUTANEOUS AS NEEDED
Status: DISCONTINUED | OUTPATIENT
Start: 2021-09-10 | End: 2021-09-12 | Stop reason: HOSPADM

## 2021-09-10 RX ORDER — METHYLPREDNISOLONE SODIUM SUCCINATE 125 MG/2ML
125 INJECTION, POWDER, LYOPHILIZED, FOR SOLUTION INTRAMUSCULAR; INTRAVENOUS AS NEEDED
Status: DISCONTINUED | OUTPATIENT
Start: 2021-09-10 | End: 2021-09-12 | Stop reason: HOSPADM

## 2021-09-10 RX ORDER — DIPHENHYDRAMINE HYDROCHLORIDE 50 MG/ML
50 INJECTION INTRAMUSCULAR; INTRAVENOUS AS NEEDED
Status: CANCELLED | OUTPATIENT
Start: 2021-09-10

## 2021-09-10 RX ADMIN — CASIRIVIMAB AND IMDEVIMAB: 600; 600 INJECTION, SOLUTION, CONCENTRATE INTRAVENOUS at 10:21

## 2021-09-10 NOTE — NURSING NOTE
Arrived ambulatory to clinic for Regn Cov infusion. No distress noted. Information given, questions answered.

## 2021-10-18 ENCOUNTER — OFFICE VISIT (OUTPATIENT)
Dept: CARDIOLOGY | Facility: CLINIC | Age: 46
End: 2021-10-18

## 2021-10-18 VITALS
DIASTOLIC BLOOD PRESSURE: 101 MMHG | OXYGEN SATURATION: 98 % | WEIGHT: 168 LBS | SYSTOLIC BLOOD PRESSURE: 146 MMHG | BODY MASS INDEX: 31.72 KG/M2 | HEART RATE: 70 BPM | HEIGHT: 61 IN

## 2021-10-18 DIAGNOSIS — I25.10 CORONARY ARTERY DISEASE INVOLVING NATIVE CORONARY ARTERY OF NATIVE HEART WITHOUT ANGINA PECTORIS: Chronic | ICD-10-CM

## 2021-10-18 DIAGNOSIS — R07.2 PRECORDIAL PAIN: Primary | ICD-10-CM

## 2021-10-18 DIAGNOSIS — E78.2 MIXED HYPERLIPIDEMIA: Chronic | ICD-10-CM

## 2021-10-18 DIAGNOSIS — I10 ESSENTIAL HYPERTENSION: Chronic | ICD-10-CM

## 2021-10-18 PROBLEM — R00.2 PALPITATIONS: Status: ACTIVE | Noted: 2021-10-18

## 2021-10-18 PROCEDURE — 99214 OFFICE O/P EST MOD 30 MIN: CPT | Performed by: NURSE PRACTITIONER

## 2021-10-18 RX ORDER — ASPIRIN 81 MG/1
81 TABLET ORAL DAILY
Qty: 30 TABLET | Refills: 11
Start: 2021-10-18 | End: 2022-02-21

## 2021-10-18 RX ORDER — LORAZEPAM 1 MG/1
TABLET ORAL
COMMUNITY
Start: 2021-09-30 | End: 2022-02-21 | Stop reason: SDUPTHER

## 2021-10-18 RX ORDER — LISINOPRIL 20 MG/1
20 TABLET ORAL DAILY
Qty: 30 TABLET | Refills: 11
Start: 2021-10-18 | End: 2022-06-29 | Stop reason: SDUPTHER

## 2021-10-18 NOTE — PROGRESS NOTES
"Chief Complaint  Arm Pain (pt states she has had pain in the left arm, had covid about 1 month ago, and has been in alotof stress lately ) and tighness (pt states she feels tightness in the chest )    Subjective          Ekaterina Nelson presents to [facility] CARDIOLOGY for    History of Present Illness    Ms. Nelson was last seen in clinic on 11/15/2019.  At that visit Ms. Nelson complained of chest pain which was atypical.  She complained of fatigue and an echocardiogram was ordered.  Echo showed normal LV function, normal wall motion, and no significant valvular abnormalities.  Patient has not been seen in this clinic for nearly 2 years.    The patient reports that she was diagnosed with COVID-19 in September 2021.  Since that time she has had ongoing symptoms of tightness in her chest and shortness of breath.  She reports she also has a cough which is resolving.  She states that the chest pain has radiated down her left arm.  She also reports that she had some pain in the center of her back which radiated to her shoulders and jaws.    The patient reports that she was away over the weekend and walked quite a bit without any symptoms except shortness of breath.    Patient states that she has not had her meds this morning.  Usually her blood pressure is well controlled.  She reports systolic readings in the 120s.    Objective     Vital Signs:   BP (!) 146/101   Pulse 70   Ht 154.9 cm (61\")   Wt 76.2 kg (168 lb)   SpO2 98%   BMI 31.74 kg/m²       Physical Exam  Constitutional:       Appearance: Normal appearance. She is well-developed.   Cardiovascular:      Rate and Rhythm: Normal rate and regular rhythm.      Heart sounds: No murmur heard.  No friction rub. No gallop.    Pulmonary:      Effort: Pulmonary effort is normal. No respiratory distress.      Breath sounds: Normal breath sounds. No wheezing or rales.   Skin:     General: Skin is warm and dry.   Neurological:      Mental Status: She is " alert and oriented to person, place, and time.   Psychiatric:         Mood and Affect: Mood normal.         Behavior: Behavior normal.          Result Review :                Current Outpatient Medications   Medication Sig Dispense Refill   • buPROPion XL (WELLBUTRIN XL) 300 MG 24 hr tablet Take 1 tablet by mouth Every Morning. 90 tablet 1   • clopidogrel (PLAVIX) 75 MG tablet Take 1 tablet by mouth Daily. 90 tablet 3   • LORazepam (ATIVAN) 1 MG tablet      • omeprazole (priLOSEC) 40 MG capsule Take 1 capsule by mouth Daily. 30 capsule 2   • rosuvastatin (CRESTOR) 20 MG tablet Take 1 tablet by mouth Daily. 30 tablet 5   • vitamin D (ERGOCALCIFEROL) 1.25 MG (10319 UT) capsule capsule Take 50,000 Units by mouth 1 (One) Time Per Week. Prior to [facility] Admission, Patient was on: Pt takes Wednesdays     • aspirin (aspirin) 81 MG EC tablet Take 1 tablet by mouth Daily. 30 tablet 11   • lisinopril (PRINIVIL,ZESTRIL) 20 MG tablet Take 1 tablet by mouth Daily. 30 tablet 11     No current facility-administered medications for this visit.            Assessment and Plan    Problem List Items Addressed This Visit        Cardiac and Vasculature    Coronary artery disease involving native coronary artery of native heart without angina pectoris (Chronic)    Overview     · 9/29/2016 [facility] for STEMI: PCI ROSI RCA  · 9/29/2016 TTE: LVEF 60%  · 1/25/2017 TTE: LVEF 60%  · 11/29/2019 TTE: LVEF 65%         Mixed hyperlipidemia (Chronic)    Overview     · 2/26/2019 total cholesterol 245, triglycerides 345, HDL 44, and   · 12/13/2019 total cholesterol 120, triglycerides 96, HDL 43, and LDL 58  · 2/2/2021 total cholesterol 254, triglycerides 628, HDL 28, and          Essential hypertension (Chronic)    Relevant Medications    lisinopril (PRINIVIL,ZESTRIL) 20 MG tablet    Chest pain - Primary    Relevant Orders    Stress Test With Myocardial Perfusion One Day    Adult Transthoracic Echo Complete W/ Cont if Necessary Per Protocol               Follow Up     Medications were reviewed with the patient.    Since patient has new onset of chest pain and shortness of breath, I have ordered a echocardiogram and a nuclear stress test.    With regard to patient's medications, I have instructed her that she can decrease her aspirin dose to 81 mg daily.    We will request labs from primary care provider's office.    Return in about 3 weeks (around 11/8/2021).    Patient was given instructions and counseling regarding her condition or for health maintenance advice. Please see specific information pulled into the AVS if appropriate.

## 2021-11-17 ENCOUNTER — HOSPITAL ENCOUNTER (OUTPATIENT)
Dept: CARDIOLOGY | Facility: HOSPITAL | Age: 46
Discharge: HOME | End: 2021-11-17

## 2021-11-17 ENCOUNTER — HOSPITAL ENCOUNTER (OUTPATIENT)
Dept: NUCLEAR MEDICINE | Facility: HOSPITAL | Age: 46
Discharge: HOME | End: 2021-11-17

## 2021-11-17 DIAGNOSIS — R07.2 PRECORDIAL PAIN: ICD-10-CM

## 2021-11-17 PROCEDURE — 93306 TTE W/DOPPLER COMPLETE: CPT

## 2021-11-17 PROCEDURE — 78452 HT MUSCLE IMAGE SPECT MULT: CPT | Performed by: INTERNAL MEDICINE

## 2021-11-17 PROCEDURE — 78452 HT MUSCLE IMAGE SPECT MULT: CPT

## 2021-11-17 PROCEDURE — 0 TECHNETIUM SESTAMIBI: Performed by: NURSE PRACTITIONER

## 2021-11-17 PROCEDURE — A9500 TC99M SESTAMIBI: HCPCS | Performed by: NURSE PRACTITIONER

## 2021-11-17 PROCEDURE — 93306 TTE W/DOPPLER COMPLETE: CPT | Performed by: INTERNAL MEDICINE

## 2021-11-17 PROCEDURE — 93018 CV STRESS TEST I&R ONLY: CPT | Performed by: INTERNAL MEDICINE

## 2021-11-17 PROCEDURE — 93017 CV STRESS TEST TRACING ONLY: CPT

## 2021-11-17 RX ADMIN — TECHNETIUM TC 99M SESTAMIBI 1 DOSE: 1 INJECTION INTRAVENOUS at 08:30

## 2021-11-17 RX ADMIN — TECHNETIUM TC 99M SESTAMIBI 1 DOSE: 1 INJECTION INTRAVENOUS at 10:24

## 2021-11-19 LAB
BH CV ECHO MEAS - AO ROOT AREA (BSA CORRECTED): 1.3
BH CV ECHO MEAS - AO ROOT AREA: 4.2 CM^2
BH CV ECHO MEAS - AO ROOT DIAM: 2.3 CM
BH CV ECHO MEAS - BSA(HAYCOCK): 1.8 M^2
BH CV ECHO MEAS - BSA: 1.8 M^2
BH CV ECHO MEAS - BZI_BMI: 31.7 KILOGRAMS/M^2
BH CV ECHO MEAS - BZI_METRIC_HEIGHT: 154.9 CM
BH CV ECHO MEAS - BZI_METRIC_WEIGHT: 76.2 KG
BH CV ECHO MEAS - EDV(CUBED): 54.9 ML
BH CV ECHO MEAS - EDV(MOD-SP4): 43.5 ML
BH CV ECHO MEAS - EDV(TEICH): 62 ML
BH CV ECHO MEAS - EF(CUBED): 64.1 %
BH CV ECHO MEAS - EF(MOD-SP4): 66.9 %
BH CV ECHO MEAS - EF(TEICH): 56.4 %
BH CV ECHO MEAS - ESV(CUBED): 19.7 ML
BH CV ECHO MEAS - ESV(MOD-SP4): 14.4 ML
BH CV ECHO MEAS - ESV(TEICH): 27 ML
BH CV ECHO MEAS - FS: 28.9 %
BH CV ECHO MEAS - IVS/LVPW: 1.2
BH CV ECHO MEAS - IVSD: 1.5 CM
BH CV ECHO MEAS - LA DIMENSION: 2.7 CM
BH CV ECHO MEAS - LA/AO: 1.2
BH CV ECHO MEAS - LV DIASTOLIC VOL/BSA (35-75): 24.8 ML/M^2
BH CV ECHO MEAS - LV MASS(C)D: 194.1 GRAMS
BH CV ECHO MEAS - LV MASS(C)DI: 110.7 GRAMS/M^2
BH CV ECHO MEAS - LV SYSTOLIC VOL/BSA (12-30): 8.2 ML/M^2
BH CV ECHO MEAS - LVIDD: 3.8 CM
BH CV ECHO MEAS - LVIDS: 2.7 CM
BH CV ECHO MEAS - LVLD AP4: 7.6 CM
BH CV ECHO MEAS - LVLS AP4: 6.8 CM
BH CV ECHO MEAS - LVOT AREA (M): 3.1 CM^2
BH CV ECHO MEAS - LVOT AREA: 3.1 CM^2
BH CV ECHO MEAS - LVOT DIAM: 2 CM
BH CV ECHO MEAS - LVPWD: 1.3 CM
BH CV ECHO MEAS - MV A MAX VEL: 66 CM/SEC
BH CV ECHO MEAS - MV E MAX VEL: 82.7 CM/SEC
BH CV ECHO MEAS - MV E/A: 1.3
BH CV ECHO MEAS - PA ACC TIME: 0.14 SEC
BH CV ECHO MEAS - PA PR(ACCEL): 15.6 MMHG
BH CV ECHO MEAS - SI(CUBED): 20.1 ML/M^2
BH CV ECHO MEAS - SI(MOD-SP4): 16.6 ML/M^2
BH CV ECHO MEAS - SI(TEICH): 19.9 ML/M^2
BH CV ECHO MEAS - SV(CUBED): 35.2 ML
BH CV ECHO MEAS - SV(MOD-SP4): 29.1 ML
BH CV ECHO MEAS - SV(TEICH): 34.9 ML
BH CV NUCLEAR PRIOR STUDY: 3
BH CV REST NUCLEAR ISOTOPE DOSE: 10.6 MCI
BH CV STRESS BP STAGE 1: NORMAL
BH CV STRESS BP STAGE 2: NORMAL
BH CV STRESS COMMENTS STAGE 1: NORMAL
BH CV STRESS COMMENTS STAGE 2: NORMAL
BH CV STRESS DOSE REGADENOSON STAGE 1: 0.4
BH CV STRESS DURATION MIN STAGE 1: 3
BH CV STRESS DURATION MIN STAGE 2: 3
BH CV STRESS DURATION MIN STAGE 3: 0
BH CV STRESS DURATION SEC STAGE 1: 0
BH CV STRESS DURATION SEC STAGE 2: 0
BH CV STRESS DURATION SEC STAGE 3: 28
BH CV STRESS GRADE STAGE 1: 10
BH CV STRESS GRADE STAGE 3: 14
BH CV STRESS HR STAGE 1: 118
BH CV STRESS HR STAGE 2: 138
BH CV STRESS HR STAGE 3: 148
BH CV STRESS METS STAGE 1: 5
BH CV STRESS METS STAGE 3: 10
BH CV STRESS NUCLEAR ISOTOPE DOSE: 30.2 MCI
BH CV STRESS PROTOCOL 1: NORMAL
BH CV STRESS RECOVERY BP: NORMAL MMHG
BH CV STRESS RECOVERY HR: 84 BPM
BH CV STRESS SPEED STAGE 1: 1.7
BH CV STRESS SPEED STAGE 3: 3.4
BH CV STRESS STAGE 1: 1
BH CV STRESS STAGE 2: 2
BH CV STRESS STAGE 3: 3
LV EF NUC BP: 73 %
MAXIMAL PREDICTED HEART RATE: 172 BPM
PERCENT MAX PREDICTED HR: 86.05 %
STRESS BASELINE BP: NORMAL MMHG
STRESS BASELINE HR: 73 BPM
STRESS PERCENT HR: 101 %
STRESS POST ESTIMATED WORKLOAD: 7 METS
STRESS POST EXERCISE DUR MIN: 6 MIN
STRESS POST EXERCISE DUR SEC: 28 SEC
STRESS POST PEAK BP: NORMAL MMHG
STRESS POST PEAK HR: 148 BPM
STRESS TARGET HR: 146 BPM

## 2021-11-23 ENCOUNTER — TELEPHONE (OUTPATIENT)
Dept: CARDIOLOGY | Facility: CLINIC | Age: 46
End: 2021-11-23

## 2021-11-23 NOTE — TELEPHONE ENCOUNTER
SPOKE WITH PATIENT GAVE HER THE TEST RESULTS       ----- Message from ILEANA Lee sent at 11/22/2021  9:09 PM EST -----  Please call patient.  Normal results for stress and for echo.    Thanks, Sigrid

## 2021-11-23 NOTE — TELEPHONE ENCOUNTER
----- Message from ILEANA Lee sent at 11/22/2021  9:09 PM EST -----  Please call patient.  Normal results for both the echo and stress test.    Thanks, Sigrid

## 2022-02-21 ENCOUNTER — OFFICE VISIT (OUTPATIENT)
Dept: FAMILY MEDICINE CLINIC | Facility: CLINIC | Age: 47
End: 2022-02-21

## 2022-02-21 VITALS
DIASTOLIC BLOOD PRESSURE: 84 MMHG | WEIGHT: 179 LBS | HEIGHT: 61 IN | SYSTOLIC BLOOD PRESSURE: 126 MMHG | RESPIRATION RATE: 16 BRPM | HEART RATE: 80 BPM | OXYGEN SATURATION: 97 % | BODY MASS INDEX: 33.79 KG/M2 | TEMPERATURE: 97.9 F

## 2022-02-21 DIAGNOSIS — E53.8 VITAMIN B 12 DEFICIENCY: ICD-10-CM

## 2022-02-21 DIAGNOSIS — R53.82 CHRONIC FATIGUE: ICD-10-CM

## 2022-02-21 DIAGNOSIS — I25.10 CORONARY ARTERY DISEASE INVOLVING NATIVE CORONARY ARTERY OF NATIVE HEART WITHOUT ANGINA PECTORIS: Chronic | ICD-10-CM

## 2022-02-21 DIAGNOSIS — F06.4 ANXIETY DISORDER DUE TO GENERAL MEDICAL CONDITION WITH PANIC ATTACK: ICD-10-CM

## 2022-02-21 DIAGNOSIS — E66.9 OBESITY (BMI 30-39.9): ICD-10-CM

## 2022-02-21 DIAGNOSIS — F41.0 ANXIETY DISORDER DUE TO GENERAL MEDICAL CONDITION WITH PANIC ATTACK: ICD-10-CM

## 2022-02-21 DIAGNOSIS — E78.2 MIXED HYPERLIPIDEMIA: Primary | ICD-10-CM

## 2022-02-21 DIAGNOSIS — I10 ESSENTIAL HYPERTENSION: ICD-10-CM

## 2022-02-21 DIAGNOSIS — E78.1 PURE HYPERTRIGLYCERIDEMIA: ICD-10-CM

## 2022-02-21 PROCEDURE — 82607 VITAMIN B-12: CPT | Performed by: NURSE PRACTITIONER

## 2022-02-21 PROCEDURE — 84481 FREE ASSAY (FT-3): CPT | Performed by: NURSE PRACTITIONER

## 2022-02-21 PROCEDURE — 84439 ASSAY OF FREE THYROXINE: CPT | Performed by: NURSE PRACTITIONER

## 2022-02-21 PROCEDURE — 84445 ASSAY OF TSI GLOBULIN: CPT | Performed by: NURSE PRACTITIONER

## 2022-02-21 PROCEDURE — 99214 OFFICE O/P EST MOD 30 MIN: CPT | Performed by: NURSE PRACTITIONER

## 2022-02-21 PROCEDURE — 82306 VITAMIN D 25 HYDROXY: CPT | Performed by: NURSE PRACTITIONER

## 2022-02-21 PROCEDURE — 83036 HEMOGLOBIN GLYCOSYLATED A1C: CPT | Performed by: NURSE PRACTITIONER

## 2022-02-21 PROCEDURE — 86800 THYROGLOBULIN ANTIBODY: CPT | Performed by: NURSE PRACTITIONER

## 2022-02-21 PROCEDURE — 86803 HEPATITIS C AB TEST: CPT | Performed by: NURSE PRACTITIONER

## 2022-02-21 PROCEDURE — 80061 LIPID PANEL: CPT | Performed by: NURSE PRACTITIONER

## 2022-02-21 PROCEDURE — 86376 MICROSOMAL ANTIBODY EACH: CPT | Performed by: NURSE PRACTITIONER

## 2022-02-21 PROCEDURE — 80050 GENERAL HEALTH PANEL: CPT | Performed by: NURSE PRACTITIONER

## 2022-02-21 RX ORDER — LORAZEPAM 1 MG/1
1 TABLET ORAL DAILY PRN
Qty: 15 TABLET | Refills: 0 | Status: SHIPPED | OUTPATIENT
Start: 2022-02-21 | End: 2022-06-29 | Stop reason: SDUPTHER

## 2022-02-21 NOTE — PROGRESS NOTES
Subjective   Ekaterina Nelson is a 47 y.o. female.     Chief Complaint   Patient presents with   • Hypertension       History of Present Illness     HTN-chronic and ongoing.  She is presently on Lisinopril 20 mg.   She is under the care of Cardiology in December.  She has not been back for her results.    She has a nuclear stress test in November without any significant findings.  She reports that she has occasional CP.  She has 2 stents in RCA.  She reports that she does note palpitations at times.   She does not monitor BP at home.  She did have an MI in 2016.   She is on Plavix 75 mg. She is generally followed by ILEANA Matta in the interventional cardiology office.  Panic with anxiety-on Wellbutrin.  She reports that some depression is present.   She reports it began after her MI.  She reports that she is on Wellbutrin 300 mg.  She was given Ativan 1 mg to use at times by previous PCP for break thru anxiety and panic. She reports that her and her spouse have had a cascade of the events that have increased her symptoms. She reports she is only used Ativan when necessary. Her PDMP shows a dispense date of September 30, 2021 with a quantity of 30.  Hyperlipidemia-ongoing.  She is on Crestor 20 mg.   It is suspected she has genetic induced hyperlipidemia. Patient denies any negative side effects of cholesterol medication.  No reported myalgia or myopathies.  Vitamin D deficiency-chronic and ongoing.  Patient is presently taking vitamin D 50,000 units supplement.  No negative side effects of medication are reported.  Vitamin D level is stable with medication use.  Vitamin B12 deficiency-chronic and ongoing.  Patient is presently taking vitamin B12 supplement injection monthly.  Patient is tolerating well and denies any negative side effects.  She has been out of injections.  She reports that she has noted some neuropathic hand pain. She reports that the symptoms are generally present when her B12 is low. She has not had a  "recent B12 level      The following portions of the patient's history were reviewed and updated as appropriate: CC, ROS, allergies, current medications, past family history, past medical history, past social history, past surgical history and problem list.      Review of Systems   Constitutional: Positive for fatigue. Negative for activity change, appetite change and fever.   HENT: Negative for congestion, ear pain, nosebleeds, postnasal drip, rhinorrhea, sore throat, tinnitus, trouble swallowing and voice change.    Eyes: Negative for blurred vision, photophobia and visual disturbance.   Respiratory: Negative for cough, chest tightness, shortness of breath and wheezing.    Cardiovascular: Negative for chest pain, palpitations and leg swelling.   Gastrointestinal: Negative for abdominal pain, blood in stool, constipation, diarrhea, nausea and GERD.   Endocrine: Negative for cold intolerance, heat intolerance and polydipsia.   Genitourinary: Negative for decreased urine volume, difficulty urinating, dysuria and hematuria.   Musculoskeletal: Negative for arthralgias, back pain, gait problem and myalgias.   Skin: Negative for color change, pallor and wound.   Allergic/Immunologic: Negative.    Neurological: Positive for numbness (Fingertips). Negative for dizziness, syncope, weakness and headache.   Hematological: Negative.    Psychiatric/Behavioral: Positive for stress. Negative for decreased concentration, self-injury, sleep disturbance, suicidal ideas and depressed mood. The patient is nervous/anxious.    All other systems reviewed and are negative.      Objective     /84   Pulse 80   Temp 97.9 °F (36.6 °C)   Resp 16   Ht 154.9 cm (60.98\")   Wt 81.2 kg (179 lb)   SpO2 97%   BMI 33.84 kg/m²     Physical Exam  Vitals reviewed.   Constitutional:       General: She is not in acute distress.     Appearance: She is well-developed. She is obese. She is not diaphoretic.   HENT:      Head: Normocephalic and " atraumatic.      Jaw: No tenderness.      Comments: Oropharynx not examined.  Patient is presently wearing a face covering/mask due to COVID-19 pandemic.     Right Ear: Hearing, tympanic membrane, ear canal and external ear normal.      Left Ear: Hearing, tympanic membrane, ear canal and external ear normal.   Eyes:      General: Lids are normal. No scleral icterus.     Extraocular Movements:      Right eye: Normal extraocular motion and no nystagmus.      Left eye: Normal extraocular motion and no nystagmus.      Conjunctiva/sclera: Conjunctivae normal.      Pupils: Pupils are equal, round, and reactive to light.   Neck:      Thyroid: No thyromegaly or thyroid tenderness.      Vascular: No carotid bruit or JVD.      Trachea: No tracheal tenderness.   Cardiovascular:      Rate and Rhythm: Normal rate and regular rhythm.      Pulses:           Dorsalis pedis pulses are 2+ on the right side and 2+ on the left side.        Posterior tibial pulses are 2+ on the right side and 2+ on the left side.      Heart sounds: Normal heart sounds, S1 normal and S2 normal. No murmur heard.      Pulmonary:      Effort: Pulmonary effort is normal. No accessory muscle usage, prolonged expiration or respiratory distress.      Breath sounds: Normal breath sounds.   Chest:      Chest wall: No tenderness.   Abdominal:      General: Bowel sounds are normal. There is no distension.      Palpations: Abdomen is soft. There is no mass.      Tenderness: There is no abdominal tenderness.   Musculoskeletal:         General: No tenderness.      Cervical back: Normal range of motion and neck supple.      Right lower leg: No edema.      Left lower leg: No edema.      Comments: No muscular atrophy or flaccidity.   Lymphadenopathy:      Head:      Right side of head: No submental or submandibular adenopathy.      Left side of head: No submental or submandibular adenopathy.      Cervical: No cervical adenopathy.      Right cervical: No superficial  cervical adenopathy.     Left cervical: No superficial cervical adenopathy.   Skin:     General: Skin is warm and dry.      Capillary Refill: Capillary refill takes less than 2 seconds.      Coloration: Skin is not jaundiced or pale.      Findings: No erythema.      Nails: There is no clubbing.   Neurological:      Mental Status: She is alert and oriented to person, place, and time.      Cranial Nerves: No cranial nerve deficit or facial asymmetry.      Sensory: No sensory deficit.      Motor: No weakness, tremor, atrophy or abnormal muscle tone.      Coordination: Coordination normal.      Deep Tendon Reflexes: Reflexes are normal and symmetric.   Psychiatric:         Attention and Perception: She is attentive.         Mood and Affect: Mood is anxious (mildly). Mood is not depressed. Affect is not tearful.         Speech: Speech normal.         Behavior: Behavior normal. Behavior is cooperative.         Thought Content: Thought content normal.         Cognition and Memory: Cognition normal.         Judgment: Judgment normal.       Assessment/Plan     Diagnoses and all orders for this visit:    1. Mixed hyperlipidemia (Primary)  Overview:  · 2/26/2019 total cholesterol 245, triglycerides 345, HDL 44, and   · 12/13/2019 total cholesterol 120, triglycerides 96, HDL 43, and LDL 58  · 2/2/2021 total cholesterol 254, triglycerides 628, HDL 28, and     Assessment & Plan:  Lipid panel ordered today.  Continue Crestor 20 mg. We will make any adjustments as necessary    Orders:  -     CBC & Differential; Future  -     Comprehensive Metabolic Panel; Future  -     Hemoglobin A1c; Future  -     Hepatitis C Antibody; Future  -     Lipid Panel; Future  -     Vitamin D 25 Hydroxy; Future  -     Vitamin B12; Future  -     TSH; Future  -     T4, Free; Future  -     CBC & Differential  -     Comprehensive Metabolic Panel  -     Hemoglobin A1c  -     Hepatitis C Antibody  -     Lipid Panel  -     Vitamin D 25 Hydroxy  -      Vitamin B12  -     TSH  -     T4, Free    2. Vitamin B 12 deficiency  Assessment & Plan:  Will do weekly B12 injection x4 then resume monthly injections    Orders:  -     CBC & Differential; Future  -     Comprehensive Metabolic Panel; Future  -     Hemoglobin A1c; Future  -     Hepatitis C Antibody; Future  -     Lipid Panel; Future  -     Vitamin D 25 Hydroxy; Future  -     Vitamin B12; Future  -     TSH; Future  -     T4, Free; Future  -     CBC & Differential  -     Comprehensive Metabolic Panel  -     Hemoglobin A1c  -     Hepatitis C Antibody  -     Lipid Panel  -     Vitamin D 25 Hydroxy  -     Vitamin B12  -     TSH  -     T4, Free    3. Essential hypertension  Assessment & Plan:  Continue lisinopril 20 mg. Report any negative side effects.    Orders:  -     CBC & Differential; Future  -     Comprehensive Metabolic Panel; Future  -     Hemoglobin A1c; Future  -     Hepatitis C Antibody; Future  -     Lipid Panel; Future  -     Vitamin D 25 Hydroxy; Future  -     Vitamin B12; Future  -     TSH; Future  -     T4, Free; Future  -     CBC & Differential  -     Comprehensive Metabolic Panel  -     Hemoglobin A1c  -     Hepatitis C Antibody  -     Lipid Panel  -     Vitamin D 25 Hydroxy  -     Vitamin B12  -     TSH  -     T4, Free    4. Anxiety disorder due to general medical condition with panic attack  Assessment & Plan:  Continue Wellbutrin 300 mg. Report any negative side effects.  Continue to work on stress reduction.  Order given for as needed Ativan. Patient to report if symptoms or not controlled or improved    Orders:  -     CBC & Differential; Future  -     Comprehensive Metabolic Panel; Future  -     Hemoglobin A1c; Future  -     Hepatitis C Antibody; Future  -     Lipid Panel; Future  -     Vitamin D 25 Hydroxy; Future  -     Vitamin B12; Future  -     TSH; Future  -     T4, Free; Future  -     LORazepam (ATIVAN) 1 MG tablet; Take 1 tablet by mouth Daily As Needed for Anxiety.  Dispense: 15 tablet;  Refill: 0  -     CBC & Differential  -     Comprehensive Metabolic Panel  -     Hemoglobin A1c  -     Hepatitis C Antibody  -     Lipid Panel  -     Vitamin D 25 Hydroxy  -     Vitamin B12  -     TSH  -     T4, Free    5. Pure hypertriglyceridemia  -     CBC & Differential; Future  -     Comprehensive Metabolic Panel; Future  -     Hemoglobin A1c; Future  -     Hepatitis C Antibody; Future  -     Lipid Panel; Future  -     Vitamin D 25 Hydroxy; Future  -     Vitamin B12; Future  -     TSH; Future  -     T4, Free; Future  -     CBC & Differential  -     Comprehensive Metabolic Panel  -     Hemoglobin A1c  -     Hepatitis C Antibody  -     Lipid Panel  -     Vitamin D 25 Hydroxy  -     Vitamin B12  -     TSH  -     T4, Free    6. Obesity (BMI 30-39.9)  Assessment & Plan:  Continue to work on weight loss.  Labs for thyroid evaluation today    Orders:  -     CBC & Differential; Future  -     Comprehensive Metabolic Panel; Future  -     Hemoglobin A1c; Future  -     Hepatitis C Antibody; Future  -     Lipid Panel; Future  -     Vitamin D 25 Hydroxy; Future  -     Vitamin B12; Future  -     TSH; Future  -     T4, Free; Future  -     T3, Free; Future  -     Thyroid Stimulating Immunoglobulin; Future  -     Thyroid Antibodies; Future  -     CBC & Differential  -     Comprehensive Metabolic Panel  -     Hemoglobin A1c  -     Hepatitis C Antibody  -     Lipid Panel  -     Vitamin D 25 Hydroxy  -     Vitamin B12  -     TSH  -     T4, Free  -     T3, Free  -     Thyroid Stimulating Immunoglobulin  -     Thyroid Antibodies    7. Chronic fatigue  -     CBC & Differential; Future  -     Comprehensive Metabolic Panel; Future  -     Hemoglobin A1c; Future  -     Hepatitis C Antibody; Future  -     Lipid Panel; Future  -     Vitamin D 25 Hydroxy; Future  -     Vitamin B12; Future  -     TSH; Future  -     T4, Free; Future  -     CBC & Differential  -     Comprehensive Metabolic Panel  -     Hemoglobin A1c  -     Hepatitis C  Antibody  -     Lipid Panel  -     Vitamin D 25 Hydroxy  -     Vitamin B12  -     TSH  -     T4, Free    8. Coronary artery disease involving native coronary artery of native heart without angina pectoris  Overview:  · 9/29/2016 [facility] for STEMI: PCI ROSI RCA  · 9/29/2016 TTE: LVEF 60%  · 1/25/2017 TTE: LVEF 60%  · 11/29/2019 TTE: LVEF 65%    Assessment & Plan:  Continue Plavix 75 mg.  Encouraged to reschedule appointment with cardiology.  Continue Crestor as directed.  Continue to pursue low-cholesterol diet      Other orders  -     Cyanocobalamin 1000 MCG/ML kit; Inject 1 mL as directed 1 (One) Time Per Week.  Dispense: 4 each; Refill: 0       Patient's Body mass index is 33.84 kg/m². indicating that she is obese (BMI >30). Obesity-related health conditions include the following: hypertension, coronary heart disease, dyslipidemias and GERD. Obesity is unchanged. BMI is is above average. We discussed portion control and increasing exercise..     Patient screened positive for depression based on a PHQ-9 score of 16 on 2/21/2022. Follow-up recommendations include: PCP managing depression.    Understands disease processes and need for medications.  Understands reasons for urgent and emergent care.  Patient (& family) verbalized agreement for treatment plan.   Emotional support and active listening provided.  Patient provided time to verbalize feelings.    EILEEN/USHA reviewed today and consistent.  Will refill prescribed controlled medication today.  Patient is aware they cannot receive narcotics from any other provider except if under care of pain management or speciality clinic.  Risk and benefits of medication use has been reviewed.  History and physical exam exhibit continued safe and appropriate use of controlled substances.  The patient is aware of the potential for addiction and dependence.  This patient has been made aware of the appropriate use of such medications, including potential risk of somnolence, limited  ability to drive and / or work safely, and potential for overdose.    It has also been made clear that these medications are for use by this patient only, without concomitant use of alcohol or other substances unless prescribed/advised by medical provider.  Patient understands they may be subject to UDS and pill counts at random.      Patient considered to be low risk for addiction due to use of single controlled medications.  Patient understands and accepts these risks.  Patient need for medication will be reassessed at each visit.  Doses will be adjusted according to patient need and findings.    Goal of TX: Patient will not have any adverse reactions of medication.  Patient have reduction in anxiety symptoms with use of PRN Ativan as directed.  Patient will be able to remain active in an outside of home with use of Ativan for anxiety symptoms      Medication Dispense Information    Lorazepam   Dispensed: 9/30/2021 12:00 AM   Written:  9/30/2021   Unit strength: 1MG   Days supply: 30   Dispense Note: GivenName=DEBBYTee=CRISSBirthDate=[id number]=[address] [place], [place], [address]   Quantity: 30 each   Pharmacy: [facility]   Authorizing provider: RAMIREZ ROSADO   Received from: EILEEN PDMP (Fill History)   Brand or Generic:       Fasting labs ordered. Will report results to patient.    RTC 2 to 3 months, sooner if needed for problems or concerns          This document has been electronically signed by:  ILEANA Vega, LYNNC    Dragon disclaimer:  Part of this note may be an electronic transcription/translation of spoken language to printed text using the Dragon Dictation System.

## 2022-02-22 LAB
25(OH)D3 SERPL-MCNC: 21.3 NG/ML (ref 30–100)
ALBUMIN SERPL-MCNC: 4.3 G/DL (ref 3.5–5.2)
ALBUMIN/GLOB SERPL: 1.4 G/DL
ALP SERPL-CCNC: 56 U/L (ref 39–117)
ALT SERPL W P-5'-P-CCNC: 21 U/L (ref 1–33)
ANION GAP SERPL CALCULATED.3IONS-SCNC: 10.6 MMOL/L (ref 5–15)
AST SERPL-CCNC: 16 U/L (ref 1–32)
BASOPHILS # BLD AUTO: 0.06 10*3/MM3 (ref 0–0.2)
BASOPHILS NFR BLD AUTO: 0.8 % (ref 0–1.5)
BILIRUB SERPL-MCNC: 0.3 MG/DL (ref 0–1.2)
BUN SERPL-MCNC: 9 MG/DL (ref 6–20)
BUN/CREAT SERPL: 9.3 (ref 7–25)
CALCIUM SPEC-SCNC: 9.6 MG/DL (ref 8.6–10.5)
CHLORIDE SERPL-SCNC: 103 MMOL/L (ref 98–107)
CHOLEST SERPL-MCNC: 253 MG/DL (ref 0–200)
CO2 SERPL-SCNC: 24.4 MMOL/L (ref 22–29)
CREAT SERPL-MCNC: 0.97 MG/DL (ref 0.57–1)
DEPRECATED RDW RBC AUTO: 41.8 FL (ref 37–54)
EOSINOPHIL # BLD AUTO: 0.07 10*3/MM3 (ref 0–0.4)
EOSINOPHIL NFR BLD AUTO: 0.9 % (ref 0.3–6.2)
ERYTHROCYTE [DISTWIDTH] IN BLOOD BY AUTOMATED COUNT: 13 % (ref 12.3–15.4)
GFR SERPL CREATININE-BSD FRML MDRD: 61 ML/MIN/1.73
GLOBULIN UR ELPH-MCNC: 3.1 GM/DL
GLUCOSE SERPL-MCNC: 86 MG/DL (ref 65–99)
HBA1C MFR BLD: 5.8 % (ref 4.8–5.6)
HCT VFR BLD AUTO: 38.6 % (ref 34–46.6)
HCV AB SER DONR QL: NORMAL
HDLC SERPL-MCNC: 41 MG/DL (ref 40–60)
HGB BLD-MCNC: 13.5 G/DL (ref 12–15.9)
IMM GRANULOCYTES # BLD AUTO: 0.05 10*3/MM3 (ref 0–0.05)
IMM GRANULOCYTES NFR BLD AUTO: 0.7 % (ref 0–0.5)
LDLC SERPL CALC-MCNC: 178 MG/DL (ref 0–100)
LDLC/HDLC SERPL: 4.28 {RATIO}
LYMPHOCYTES # BLD AUTO: 2.29 10*3/MM3 (ref 0.7–3.1)
LYMPHOCYTES NFR BLD AUTO: 30.6 % (ref 19.6–45.3)
MCH RBC QN AUTO: 31 PG (ref 26.6–33)
MCHC RBC AUTO-ENTMCNC: 35 G/DL (ref 31.5–35.7)
MCV RBC AUTO: 88.7 FL (ref 79–97)
MONOCYTES # BLD AUTO: 0.55 10*3/MM3 (ref 0.1–0.9)
MONOCYTES NFR BLD AUTO: 7.4 % (ref 5–12)
NEUTROPHILS NFR BLD AUTO: 4.46 10*3/MM3 (ref 1.7–7)
NEUTROPHILS NFR BLD AUTO: 59.6 % (ref 42.7–76)
NRBC BLD AUTO-RTO: 0 /100 WBC (ref 0–0.2)
PLATELET # BLD AUTO: 263 10*3/MM3 (ref 140–450)
PMV BLD AUTO: 11.8 FL (ref 6–12)
POTASSIUM SERPL-SCNC: 4.4 MMOL/L (ref 3.5–5.2)
PROT SERPL-MCNC: 7.4 G/DL (ref 6–8.5)
RBC # BLD AUTO: 4.35 10*6/MM3 (ref 3.77–5.28)
SODIUM SERPL-SCNC: 138 MMOL/L (ref 136–145)
T3FREE SERPL-MCNC: 2.93 PG/ML (ref 2–4.4)
T4 FREE SERPL-MCNC: 0.78 NG/DL (ref 0.93–1.7)
THYROGLOB AB SERPL-ACNC: <1 IU/ML (ref 0–0.9)
THYROPEROXIDASE AB SERPL-ACNC: 8 IU/ML (ref 0–34)
TRIGL SERPL-MCNC: 182 MG/DL (ref 0–150)
TSH SERPL DL<=0.05 MIU/L-ACNC: 3.44 UIU/ML (ref 0.27–4.2)
VIT B12 BLD-MCNC: 528 PG/ML (ref 211–946)
VLDLC SERPL-MCNC: 34 MG/DL (ref 5–40)
WBC NRBC COR # BLD: 7.48 10*3/MM3 (ref 3.4–10.8)

## 2022-02-22 NOTE — ASSESSMENT & PLAN NOTE
Continue Wellbutrin 300 mg. Report any negative side effects.  Continue to work on stress reduction.  Order given for as needed Ativan. Patient to report if symptoms or not controlled or improved

## 2022-02-22 NOTE — ASSESSMENT & PLAN NOTE
Continue Plavix 75 mg.  Encouraged to reschedule appointment with cardiology.  Continue Crestor as directed.  Continue to pursue low-cholesterol diet

## 2022-02-23 LAB — TSI SER-ACNC: <0.1 IU/L (ref 0–0.55)

## 2022-02-27 NOTE — PROGRESS NOTES
Patient notified of labs via [employer].  Patient message is as follows:      Blood sugars have mildly increased and your A1c is at 5.8.  Thyroid is normal your routine recommendation health screening of hepatitis C screening is negative.  Your vitamin D is twenty-one.  Are you taking a vitamin D supplement?    B12 is 528 which is in the normal range.  Kidney and liver function are good.  Sodium and potassium are good.  Your cholesterol is 253 and your triglycerides is 182.  Your bad cholesterol is almost 200.  It should be at 100 and at optimal level of seventy.  Are you taking your Crestor twenty every day?    Blood count is normal

## 2022-06-29 ENCOUNTER — TELEPHONE (OUTPATIENT)
Dept: FAMILY MEDICINE CLINIC | Facility: CLINIC | Age: 47
End: 2022-06-29

## 2022-06-29 DIAGNOSIS — F41.0 ANXIETY DISORDER DUE TO GENERAL MEDICAL CONDITION WITH PANIC ATTACK: ICD-10-CM

## 2022-06-29 DIAGNOSIS — I25.10 CORONARY ARTERY DISEASE INVOLVING NATIVE CORONARY ARTERY OF NATIVE HEART WITHOUT ANGINA PECTORIS: ICD-10-CM

## 2022-06-29 DIAGNOSIS — F06.4 ANXIETY DISORDER DUE TO GENERAL MEDICAL CONDITION WITH PANIC ATTACK: ICD-10-CM

## 2022-06-29 RX ORDER — ROSUVASTATIN CALCIUM 20 MG/1
20 TABLET, COATED ORAL DAILY
Qty: 30 TABLET | Refills: 5 | Status: SHIPPED | OUTPATIENT
Start: 2022-06-29 | End: 2022-11-23 | Stop reason: SDUPTHER

## 2022-06-29 RX ORDER — LORAZEPAM 1 MG/1
1 TABLET ORAL DAILY PRN
Qty: 15 TABLET | Refills: 0 | Status: SHIPPED | OUTPATIENT
Start: 2022-06-29 | End: 2022-11-23 | Stop reason: SDUPTHER

## 2022-06-29 RX ORDER — BUPROPION HYDROCHLORIDE 300 MG/1
300 TABLET ORAL EVERY MORNING
Qty: 90 TABLET | Refills: 1 | Status: SHIPPED | OUTPATIENT
Start: 2022-06-29 | End: 2022-11-23 | Stop reason: SDUPTHER

## 2022-06-29 RX ORDER — OMEPRAZOLE 40 MG/1
40 CAPSULE, DELAYED RELEASE ORAL DAILY
Qty: 30 CAPSULE | Refills: 2 | Status: SHIPPED | OUTPATIENT
Start: 2022-06-29 | End: 2022-11-23 | Stop reason: SDUPTHER

## 2022-06-29 RX ORDER — CLOPIDOGREL BISULFATE 75 MG/1
75 TABLET ORAL DAILY
Qty: 90 TABLET | Refills: 3 | Status: SHIPPED | OUTPATIENT
Start: 2022-06-29 | End: 2023-02-23 | Stop reason: SDUPTHER

## 2022-06-29 RX ORDER — ERGOCALCIFEROL 1.25 MG/1
50000 CAPSULE ORAL WEEKLY
Qty: 4 CAPSULE | Refills: 5 | Status: SHIPPED | OUTPATIENT
Start: 2022-06-29 | End: 2022-11-23 | Stop reason: SDUPTHER

## 2022-06-29 RX ORDER — FUROSEMIDE 20 MG/1
20 TABLET ORAL DAILY
Qty: 90 TABLET | Refills: 2 | Status: SHIPPED | OUTPATIENT
Start: 2022-06-29 | End: 2023-04-04 | Stop reason: SDUPTHER

## 2022-06-29 RX ORDER — LISINOPRIL 20 MG/1
20 TABLET ORAL DAILY
Qty: 30 TABLET | Refills: 11
Start: 2022-06-29 | End: 2022-11-23 | Stop reason: SDUPTHER

## 2022-06-29 NOTE — TELEPHONE ENCOUNTER
Caller: TAMMI WITH [facility] [id number] - [phone]  - [phone] FX    Relationship: Pharmacy    Best call back number: [phone]    Requested Prescriptions:   LASIX      Pharmacy where request should be sent: [facility] [id number] - [phone]  - [phone] FX     Additional details provided by patient: PATIENT IS COMPLETELY OUT OF THE MEDICATION    Does the patient have less than a 3 day supply:  [x] Yes  [] No    Aba Talbot Rep   06/29/22 15:34 EDT

## 2022-07-26 RX ORDER — CYANOCOBALAMIN 1000 UG/ML
INJECTION, SOLUTION INTRAMUSCULAR; SUBCUTANEOUS
Qty: 1 ML | Refills: 5 | Status: SHIPPED | OUTPATIENT
Start: 2022-07-26 | End: 2022-11-23 | Stop reason: SDUPTHER

## 2022-11-23 ENCOUNTER — OFFICE VISIT (OUTPATIENT)
Dept: FAMILY MEDICINE CLINIC | Facility: CLINIC | Age: 47
End: 2022-11-23

## 2022-11-23 VITALS
HEIGHT: 61 IN | OXYGEN SATURATION: 98 % | DIASTOLIC BLOOD PRESSURE: 80 MMHG | RESPIRATION RATE: 14 BRPM | TEMPERATURE: 98.4 F | WEIGHT: 176 LBS | BODY MASS INDEX: 33.23 KG/M2 | HEART RATE: 70 BPM | SYSTOLIC BLOOD PRESSURE: 130 MMHG

## 2022-11-23 DIAGNOSIS — F41.0 ANXIETY DISORDER DUE TO GENERAL MEDICAL CONDITION WITH PANIC ATTACK: ICD-10-CM

## 2022-11-23 DIAGNOSIS — F06.4 ANXIETY DISORDER DUE TO GENERAL MEDICAL CONDITION WITH PANIC ATTACK: ICD-10-CM

## 2022-11-23 DIAGNOSIS — E53.8 VITAMIN B 12 DEFICIENCY: ICD-10-CM

## 2022-11-23 DIAGNOSIS — E78.2 MIXED HYPERLIPIDEMIA: ICD-10-CM

## 2022-11-23 DIAGNOSIS — E55.9 VITAMIN D DEFICIENCY DISEASE: ICD-10-CM

## 2022-11-23 DIAGNOSIS — I10 ESSENTIAL HYPERTENSION: Primary | ICD-10-CM

## 2022-11-23 DIAGNOSIS — E78.1 PURE HYPERTRIGLYCERIDEMIA: ICD-10-CM

## 2022-11-23 PROCEDURE — 99214 OFFICE O/P EST MOD 30 MIN: CPT | Performed by: NURSE PRACTITIONER

## 2022-11-23 RX ORDER — LORAZEPAM 1 MG/1
1 TABLET ORAL DAILY PRN
Qty: 15 TABLET | Refills: 0 | Status: SHIPPED | OUTPATIENT
Start: 2022-11-23 | End: 2023-02-23 | Stop reason: SDUPTHER

## 2022-11-23 RX ORDER — BUPROPION HYDROCHLORIDE 300 MG/1
300 TABLET ORAL EVERY MORNING
Qty: 90 TABLET | Refills: 1 | Status: SHIPPED | OUTPATIENT
Start: 2022-11-23 | End: 2023-04-04 | Stop reason: SDUPTHER

## 2022-11-23 RX ORDER — CYANOCOBALAMIN 1000 UG/ML
1000 INJECTION, SOLUTION INTRAMUSCULAR; SUBCUTANEOUS WEEKLY
Qty: 4 ML | Refills: 5 | Status: SHIPPED | OUTPATIENT
Start: 2022-11-23 | End: 2023-04-04 | Stop reason: SDUPTHER

## 2022-11-23 RX ORDER — OMEPRAZOLE 40 MG/1
40 CAPSULE, DELAYED RELEASE ORAL DAILY
Qty: 90 CAPSULE | Refills: 2 | Status: SHIPPED | OUTPATIENT
Start: 2022-11-23

## 2022-11-23 RX ORDER — ROSUVASTATIN CALCIUM 20 MG/1
20 TABLET, COATED ORAL DAILY
Qty: 90 TABLET | Refills: 2 | Status: SHIPPED | OUTPATIENT
Start: 2022-11-23

## 2022-11-23 RX ORDER — LISINOPRIL 20 MG/1
20 TABLET ORAL DAILY
Qty: 90 TABLET | Refills: 2
Start: 2022-11-23 | End: 2023-02-23 | Stop reason: SDUPTHER

## 2022-11-23 RX ORDER — ERGOCALCIFEROL 1.25 MG/1
50000 CAPSULE ORAL WEEKLY
Qty: 12 CAPSULE | Refills: 2 | Status: SHIPPED | OUTPATIENT
Start: 2022-11-23

## 2022-11-23 NOTE — PROGRESS NOTES
Subjective   Ekaterina Nelson is a 47 y.o. female.     Chief Complaint   Patient presents with   • Hypertension     Answers for HPI/ROS submitted by the patient on 11/22/2022  What is the primary reason for your visit?: Back Pain    History of Present Illness     B12 deficiency-chronic and ongoing.  Patient is currently taking B12 injections.  Patient is tolerating well and denies any negative side effects.  No injection site reactions.   GERD-chronic and ongoing.  Patient is currently taking Prilosec 40 mg.  No negative side effects.  No negative side effects of medication.  Patient does avoid foods that trigger reflux symptoms.  Denies any recent exacerbations.  CAD/HTN/hyperlipidemia-ongoing.  Patient is currently on Plavix 75 mg, Lasix 20 mg, lisinopril 20 mg, and Crestor 20 mg.  Blood pressure is stable today.  Depression and anxiety-ongoing.  Patient is currently on Wellbutrin 300 mg and as needed Ativan.  No negative side effects.  Back pain-on the right side.  She reports has been intermittent for about 3 months.  She reports occurring more often.  She reports that she has noted some pressure sensation when she lays back onto a hard surface.  She reports a fall last year from a ladder.    CP-noted that when the cold weather happens she has noted some CP.  She reports she has noted some fluid retention as well.  She has noted hand tightness.  She feels that the fluid retention causes some SOA.  She reports that she has noted her weight was down 3-4 pounds at home in just a few days.  She reports that the pattern of CP seems to happen every year around this time and she has had 2 negative cardiac work up.  She does feels she is not drinking as well as she should.      The following portions of the patient's history were reviewed and updated as appropriate: CC, ROS, allergies, current medications, past family history, past medical history, past social history, past surgical history and problem list.      Review of  "Systems   Constitutional: Positive for fatigue. Negative for appetite change, fever and unexpected weight loss.   HENT: Negative for congestion, ear pain, nosebleeds, postnasal drip, rhinorrhea, sore throat, tinnitus, trouble swallowing and voice change.    Eyes: Negative for blurred vision, photophobia and visual disturbance.   Respiratory: Negative for cough, chest tightness, shortness of breath and wheezing.    Cardiovascular: Positive for chest pain and leg swelling (occasional). Negative for palpitations.   Gastrointestinal: Negative for abdominal pain, blood in stool, constipation, diarrhea, nausea and GERD.   Endocrine: Negative for cold intolerance, heat intolerance and polydipsia.   Genitourinary: Negative for decreased urine volume, difficulty urinating, dysuria, hematuria, pelvic pain and urinary incontinence.   Musculoskeletal: Positive for back pain. Negative for arthralgias, gait problem and myalgias.   Skin: Negative for color change, pallor and wound.   Allergic/Immunologic: Negative.    Neurological: Negative for dizziness, syncope, weakness, numbness and headache.   Hematological: Negative.    Psychiatric/Behavioral: Negative for decreased concentration, sleep disturbance, suicidal ideas and depressed mood. The patient is not nervous/anxious.    All other systems reviewed and are negative.      Objective     /80   Pulse 70   Temp 98.4 °F (36.9 °C)   Resp 14   Ht 154.9 cm (60.98\")   Wt 79.8 kg (176 lb)   SpO2 98%   BMI 33.27 kg/m²     Physical Exam  Vitals reviewed.   Constitutional:       General: She is not in acute distress.     Appearance: She is well-developed. She is obese. She is not diaphoretic.   HENT:      Head: Normocephalic and atraumatic.      Jaw: No tenderness.      Comments: Oropharynx not examined.  Patient is presently wearing a face covering/mask due to COVID-19 pandemic.     Right Ear: Hearing, tympanic membrane, ear canal and external ear normal.      Left Ear: " Hearing, tympanic membrane, ear canal and external ear normal.   Eyes:      General: Lids are normal. No scleral icterus.     Extraocular Movements:      Right eye: Normal extraocular motion and no nystagmus.      Left eye: Normal extraocular motion and no nystagmus.      Conjunctiva/sclera: Conjunctivae normal.      Pupils: Pupils are equal, round, and reactive to light.   Neck:      Thyroid: No thyromegaly or thyroid tenderness.      Vascular: No carotid bruit or JVD.      Trachea: No tracheal tenderness.   Cardiovascular:      Rate and Rhythm: Normal rate and regular rhythm.      Pulses:           Dorsalis pedis pulses are 2+ on the right side and 2+ on the left side.        Posterior tibial pulses are 2+ on the right side and 2+ on the left side.      Heart sounds: Normal heart sounds, S1 normal and S2 normal. No murmur heard.  Pulmonary:      Effort: Pulmonary effort is normal. No accessory muscle usage, prolonged expiration or respiratory distress.      Breath sounds: Normal breath sounds.   Chest:      Chest wall: No tenderness.   Abdominal:      General: Bowel sounds are normal. There is no distension.      Palpations: Abdomen is soft. There is no mass.      Tenderness: There is no abdominal tenderness.   Musculoskeletal:         General: No tenderness.      Cervical back: Normal range of motion and neck supple.      Right lower leg: No edema.      Left lower leg: No edema.      Comments: No muscular atrophy or flaccidity.   Lymphadenopathy:      Head:      Right side of head: No submental or submandibular adenopathy.      Left side of head: No submental or submandibular adenopathy.      Cervical: No cervical adenopathy.      Right cervical: No superficial cervical adenopathy.     Left cervical: No superficial cervical adenopathy.   Skin:     General: Skin is warm and dry.      Capillary Refill: Capillary refill takes less than 2 seconds.      Coloration: Skin is not jaundiced or pale.      Findings: No  erythema.      Nails: There is no clubbing.   Neurological:      Mental Status: She is alert and oriented to person, place, and time.      Cranial Nerves: No cranial nerve deficit or facial asymmetry.      Sensory: No sensory deficit.      Motor: No weakness, tremor, atrophy or abnormal muscle tone.      Coordination: Coordination normal.      Deep Tendon Reflexes: Reflexes are normal and symmetric.   Psychiatric:         Attention and Perception: She is attentive.         Mood and Affect: Mood is anxious. Mood is not depressed. Affect is not tearful.         Speech: Speech normal.         Behavior: Behavior normal. Behavior is cooperative.         Thought Content: Thought content normal.         Cognition and Memory: Cognition normal.         Judgment: Judgment normal.           Diagnoses and all orders for this visit:    1. Essential hypertension (Primary)  Assessment & Plan:  Continue lisinopril 20 mg. Report any negative side effects.  Ambulatory BP monitoring either at home or random community checks.  Patient to report continued elevations >140/90.  Patient may come by office for checks if needed.       Orders:  -     lisinopril (PRINIVIL,ZESTRIL) 20 MG tablet; Take 1 tablet by mouth Daily.  Dispense: 90 tablet; Refill: 2    2. Anxiety disorder due to general medical condition with panic attack  Assessment & Plan:  Continue PRN Ativan and Wellbutrin 300 mg daily  Stress reduction advised (examples:  make time for self, Reading, Listening to music, Exercise, keeping a journal, venting to a confidant, etc.)        Orders:  -     LORazepam (ATIVAN) 1 MG tablet; Take 1 tablet by mouth Daily As Needed for Anxiety.  Dispense: 15 tablet; Refill: 0  -     buPROPion XL (WELLBUTRIN XL) 300 MG 24 hr tablet; Take 1 tablet by mouth Every Morning.  Dispense: 90 tablet; Refill: 1    3. Vitamin B 12 deficiency  Assessment & Plan:  Continue B12 injection.   Report any negative side effects.     Orders:  -     cyanocobalamin  1000 MCG/ML injection; Inject 1 mL into the appropriate muscle as directed by prescriber 1 (One) Time Per Week.  Dispense: 4 mL; Refill: 5    4. Mixed hyperlipidemia  Assessment & Plan:  Continue Crestor 20 mg   Continue to pursue a low cholesterol diet      5. Pure hypertriglyceridemia  -     rosuvastatin (CRESTOR) 20 MG tablet; Take 1 tablet by mouth Daily.  Dispense: 90 tablet; Refill: 2    6. Vitamin D deficiency disease  -     vitamin D (ERGOCALCIFEROL) 1.25 MG (52438 UT) capsule capsule; Take 1 capsule by mouth 1 (One) Time Per Week. Prior to [facility] Admission, Patient was on: Pt takes Wednesdays  Dispense: 12 capsule; Refill: 2    Other orders  -     omeprazole (priLOSEC) 40 MG capsule; Take 1 capsule by mouth Daily.  Dispense: 90 capsule; Refill: 2     BMI is >= 30 and <35. (Class 1 Obesity). The following options were offered after discussion;: nutrition counseling/recommendations       Understands disease processes and need for medications.  Understands reasons for urgent and emergent care.  Patient (& family) verbalized agreement for treatment plan.   Emotional support and active listening provided.  Patient provided time to verbalize feelings.    EILEEN/PMDP reviewed today and consistent.  Will refill prescribed controlled medication today.  Patient is aware they cannot receive narcotics from any other provider except if under care of pain management or speciality clinic.  Risk and benefits of medication use has been reviewed.  History and physical exam exhibit continued safe and appropriate use of controlled substances.  The patient is aware of the potential for addiction and dependence.  This patient has been made aware of the appropriate use of such medications, including potential risk of somnolence, limited ability to drive and / or work safely, and potential for overdose.    It has also been made clear that these medications are for use by this patient only, without concomitant use of alcohol or  other substances unless prescribed/advised by medical provider.  Patient understands they may be subject to UDS and pill counts at random.      Patient considered to be low risk for addiction due to use of single controlled medications.  Patient understands and accepts these risks.  Patient need for medication will be reassessed at each visit.  Doses will be adjusted according to patient need and findings.    Goal of TX: Patient will not have any adverse reactions of medication.  Patient have reduction in anxiety symptoms with use of PRN Ativan as directed.  Patient will be able to remain active in an outside of home with use of Ativan for anxiety symptoms    EILEEN Patient Controlled Substance Report (from 11/28/2021 to 11/23/2022)    Dispensed  Strength Quantity Days Supply Provider Pharmacy   02/21/2022 Lorazepam 1MG 15 each 15 [facility]         Will plan for updated fasting labs.     RTC 3 months, sooner if needed.             This document has been electronically signed by:  ILEANA Vega FNP-C Dragon disclaimer:  Part of this note may be an electronic transcription/translation of spoken language to printed text using the Dragon Dictation System.

## 2022-11-28 DIAGNOSIS — F06.4 ANXIETY DISORDER DUE TO GENERAL MEDICAL CONDITION WITH PANIC ATTACK: ICD-10-CM

## 2022-11-28 DIAGNOSIS — E78.2 MIXED HYPERLIPIDEMIA: ICD-10-CM

## 2022-11-28 DIAGNOSIS — I10 ESSENTIAL HYPERTENSION: Primary | ICD-10-CM

## 2022-11-28 DIAGNOSIS — E55.9 VITAMIN D DEFICIENCY DISEASE: ICD-10-CM

## 2022-11-28 DIAGNOSIS — R53.82 CHRONIC FATIGUE: ICD-10-CM

## 2022-11-28 DIAGNOSIS — R73.09 ABNORMAL GLUCOSE: ICD-10-CM

## 2022-11-28 DIAGNOSIS — E53.8 VITAMIN B 12 DEFICIENCY: ICD-10-CM

## 2022-11-28 DIAGNOSIS — F41.0 ANXIETY DISORDER DUE TO GENERAL MEDICAL CONDITION WITH PANIC ATTACK: ICD-10-CM

## 2022-11-29 NOTE — ASSESSMENT & PLAN NOTE
Continue lisinopril 20 mg. Report any negative side effects.  Ambulatory BP monitoring either at home or random community checks.  Patient to report continued elevations >140/90.  Patient may come by office for checks if needed.

## 2022-11-29 NOTE — ASSESSMENT & PLAN NOTE
Continue PRN Ativan and Wellbutrin 300 mg daily  Stress reduction advised (examples:  make time for self, Reading, Listening to music, Exercise, keeping a journal, venting to a confidant, etc.)

## 2023-02-23 DIAGNOSIS — I25.10 CORONARY ARTERY DISEASE INVOLVING NATIVE CORONARY ARTERY OF NATIVE HEART WITHOUT ANGINA PECTORIS: ICD-10-CM

## 2023-02-23 DIAGNOSIS — I10 ESSENTIAL HYPERTENSION: ICD-10-CM

## 2023-02-23 DIAGNOSIS — F41.0 ANXIETY DISORDER DUE TO GENERAL MEDICAL CONDITION WITH PANIC ATTACK: ICD-10-CM

## 2023-02-23 DIAGNOSIS — F06.4 ANXIETY DISORDER DUE TO GENERAL MEDICAL CONDITION WITH PANIC ATTACK: ICD-10-CM

## 2023-02-23 RX ORDER — ASPIRIN 81 MG/1
81 TABLET ORAL DAILY
Qty: 90 TABLET | Refills: 2 | Status: SHIPPED | OUTPATIENT
Start: 2023-02-23

## 2023-02-23 RX ORDER — LISINOPRIL 20 MG/1
20 TABLET ORAL DAILY
Qty: 90 TABLET | Refills: 2 | Status: SHIPPED | OUTPATIENT
Start: 2023-02-23

## 2023-02-23 RX ORDER — LORAZEPAM 1 MG/1
1 TABLET ORAL DAILY PRN
Qty: 30 TABLET | Refills: 0 | Status: SHIPPED | OUTPATIENT
Start: 2023-02-23 | End: 2023-04-04 | Stop reason: SDUPTHER

## 2023-02-23 RX ORDER — CLOPIDOGREL BISULFATE 75 MG/1
75 TABLET ORAL DAILY
Qty: 90 TABLET | Refills: 2 | Status: SHIPPED | OUTPATIENT
Start: 2023-02-23

## 2023-03-29 ENCOUNTER — LAB (OUTPATIENT)
Dept: FAMILY MEDICINE CLINIC | Facility: CLINIC | Age: 48
End: 2023-03-29
Payer: COMMERCIAL

## 2023-03-29 PROCEDURE — 82607 VITAMIN B-12: CPT | Performed by: NURSE PRACTITIONER

## 2023-03-29 PROCEDURE — 80061 LIPID PANEL: CPT | Performed by: NURSE PRACTITIONER

## 2023-03-29 PROCEDURE — 80050 GENERAL HEALTH PANEL: CPT | Performed by: NURSE PRACTITIONER

## 2023-03-29 PROCEDURE — 84439 ASSAY OF FREE THYROXINE: CPT | Performed by: NURSE PRACTITIONER

## 2023-03-29 PROCEDURE — 82306 VITAMIN D 25 HYDROXY: CPT | Performed by: NURSE PRACTITIONER

## 2023-03-29 PROCEDURE — 83036 HEMOGLOBIN GLYCOSYLATED A1C: CPT | Performed by: NURSE PRACTITIONER

## 2023-03-30 LAB
25(OH)D3 SERPL-MCNC: 33.8 NG/ML (ref 30–100)
ALBUMIN SERPL-MCNC: 4.4 G/DL (ref 3.5–5.2)
ALBUMIN/GLOB SERPL: 1.5 G/DL
ALP SERPL-CCNC: 63 U/L (ref 39–117)
ALT SERPL W P-5'-P-CCNC: 29 U/L (ref 1–33)
ANION GAP SERPL CALCULATED.3IONS-SCNC: 10 MMOL/L (ref 5–15)
AST SERPL-CCNC: 20 U/L (ref 1–32)
BASOPHILS # BLD AUTO: 0.06 10*3/MM3 (ref 0–0.2)
BASOPHILS NFR BLD AUTO: 1 % (ref 0–1.5)
BILIRUB SERPL-MCNC: 0.2 MG/DL (ref 0–1.2)
BUN SERPL-MCNC: 10 MG/DL (ref 6–20)
BUN/CREAT SERPL: 9.7 (ref 7–25)
CALCIUM SPEC-SCNC: 9.7 MG/DL (ref 8.6–10.5)
CHLORIDE SERPL-SCNC: 105 MMOL/L (ref 98–107)
CHOLEST SERPL-MCNC: 155 MG/DL (ref 0–200)
CO2 SERPL-SCNC: 28 MMOL/L (ref 22–29)
CREAT SERPL-MCNC: 1.03 MG/DL (ref 0.57–1)
DEPRECATED RDW RBC AUTO: 41.5 FL (ref 37–54)
EGFRCR SERPLBLD CKD-EPI 2021: 66.4 ML/MIN/1.73
EOSINOPHIL # BLD AUTO: 0.09 10*3/MM3 (ref 0–0.4)
EOSINOPHIL NFR BLD AUTO: 1.4 % (ref 0.3–6.2)
ERYTHROCYTE [DISTWIDTH] IN BLOOD BY AUTOMATED COUNT: 12.6 % (ref 12.3–15.4)
GLOBULIN UR ELPH-MCNC: 3 GM/DL
GLUCOSE SERPL-MCNC: 86 MG/DL (ref 65–99)
HBA1C MFR BLD: 5.9 % (ref 4.8–5.6)
HCT VFR BLD AUTO: 37.5 % (ref 34–46.6)
HDLC SERPL-MCNC: 41 MG/DL (ref 40–60)
HGB BLD-MCNC: 12.8 G/DL (ref 12–15.9)
IMM GRANULOCYTES # BLD AUTO: 0.02 10*3/MM3 (ref 0–0.05)
IMM GRANULOCYTES NFR BLD AUTO: 0.3 % (ref 0–0.5)
LDLC SERPL CALC-MCNC: 85 MG/DL (ref 0–100)
LDLC/HDLC SERPL: 1.95 {RATIO}
LYMPHOCYTES # BLD AUTO: 2.28 10*3/MM3 (ref 0.7–3.1)
LYMPHOCYTES NFR BLD AUTO: 36.6 % (ref 19.6–45.3)
MCH RBC QN AUTO: 30.5 PG (ref 26.6–33)
MCHC RBC AUTO-ENTMCNC: 34.1 G/DL (ref 31.5–35.7)
MCV RBC AUTO: 89.3 FL (ref 79–97)
MONOCYTES # BLD AUTO: 0.55 10*3/MM3 (ref 0.1–0.9)
MONOCYTES NFR BLD AUTO: 8.8 % (ref 5–12)
NEUTROPHILS NFR BLD AUTO: 3.23 10*3/MM3 (ref 1.7–7)
NEUTROPHILS NFR BLD AUTO: 51.9 % (ref 42.7–76)
NRBC BLD AUTO-RTO: 0 /100 WBC (ref 0–0.2)
PLATELET # BLD AUTO: 261 10*3/MM3 (ref 140–450)
PMV BLD AUTO: 11.5 FL (ref 6–12)
POTASSIUM SERPL-SCNC: 4.8 MMOL/L (ref 3.5–5.2)
PROT SERPL-MCNC: 7.4 G/DL (ref 6–8.5)
RBC # BLD AUTO: 4.2 10*6/MM3 (ref 3.77–5.28)
SODIUM SERPL-SCNC: 143 MMOL/L (ref 136–145)
T4 FREE SERPL-MCNC: 0.88 NG/DL (ref 0.93–1.7)
TRIGL SERPL-MCNC: 170 MG/DL (ref 0–150)
TSH SERPL DL<=0.05 MIU/L-ACNC: 3.01 UIU/ML (ref 0.27–4.2)
VIT B12 BLD-MCNC: 407 PG/ML (ref 211–946)
VLDLC SERPL-MCNC: 29 MG/DL (ref 5–40)
WBC NRBC COR # BLD: 6.23 10*3/MM3 (ref 3.4–10.8)

## 2023-04-04 ENCOUNTER — OFFICE VISIT (OUTPATIENT)
Dept: FAMILY MEDICINE CLINIC | Facility: CLINIC | Age: 48
End: 2023-04-04
Payer: COMMERCIAL

## 2023-04-04 VITALS
SYSTOLIC BLOOD PRESSURE: 124 MMHG | WEIGHT: 182.6 LBS | RESPIRATION RATE: 16 BRPM | BODY MASS INDEX: 34.48 KG/M2 | HEIGHT: 61 IN | HEART RATE: 98 BPM | OXYGEN SATURATION: 99 % | DIASTOLIC BLOOD PRESSURE: 88 MMHG | TEMPERATURE: 98.4 F

## 2023-04-04 DIAGNOSIS — R79.89 ABNORMAL THYROID BLOOD TEST: ICD-10-CM

## 2023-04-04 DIAGNOSIS — F06.4 ANXIETY DISORDER DUE TO GENERAL MEDICAL CONDITION WITH PANIC ATTACK: ICD-10-CM

## 2023-04-04 DIAGNOSIS — E66.09 CLASS 1 OBESITY DUE TO EXCESS CALORIES WITH SERIOUS COMORBIDITY AND BODY MASS INDEX (BMI) OF 34.0 TO 34.9 IN ADULT: ICD-10-CM

## 2023-04-04 DIAGNOSIS — E53.8 VITAMIN B 12 DEFICIENCY: ICD-10-CM

## 2023-04-04 DIAGNOSIS — F41.0 ANXIETY DISORDER DUE TO GENERAL MEDICAL CONDITION WITH PANIC ATTACK: ICD-10-CM

## 2023-04-04 DIAGNOSIS — R73.09 ABNORMAL GLUCOSE: Primary | ICD-10-CM

## 2023-04-04 DIAGNOSIS — Z12.31 ENCOUNTER FOR SCREENING MAMMOGRAM FOR MALIGNANT NEOPLASM OF BREAST: ICD-10-CM

## 2023-04-04 DIAGNOSIS — K59.01 SLOW TRANSIT CONSTIPATION: ICD-10-CM

## 2023-04-04 DIAGNOSIS — R42 VERTIGO: Chronic | ICD-10-CM

## 2023-04-04 DIAGNOSIS — I10 ESSENTIAL HYPERTENSION: Chronic | ICD-10-CM

## 2023-04-04 DIAGNOSIS — E06.9 THYROIDITIS: ICD-10-CM

## 2023-04-04 PROCEDURE — 1159F MED LIST DOCD IN RCRD: CPT | Performed by: NURSE PRACTITIONER

## 2023-04-04 PROCEDURE — 3079F DIAST BP 80-89 MM HG: CPT | Performed by: NURSE PRACTITIONER

## 2023-04-04 PROCEDURE — 1160F RVW MEDS BY RX/DR IN RCRD: CPT | Performed by: NURSE PRACTITIONER

## 2023-04-04 PROCEDURE — 3074F SYST BP LT 130 MM HG: CPT | Performed by: NURSE PRACTITIONER

## 2023-04-04 PROCEDURE — 99214 OFFICE O/P EST MOD 30 MIN: CPT | Performed by: NURSE PRACTITIONER

## 2023-04-04 RX ORDER — BUPROPION HYDROCHLORIDE 300 MG/1
300 TABLET ORAL EVERY MORNING
Qty: 90 TABLET | Refills: 1 | Status: SHIPPED | OUTPATIENT
Start: 2023-04-04

## 2023-04-04 RX ORDER — LORAZEPAM 1 MG/1
1 TABLET ORAL DAILY PRN
Qty: 30 TABLET | Refills: 0 | Status: SHIPPED | OUTPATIENT
Start: 2023-04-04 | End: 2023-05-08 | Stop reason: SDUPTHER

## 2023-04-04 RX ORDER — CYANOCOBALAMIN 1000 UG/ML
1000 INJECTION, SOLUTION INTRAMUSCULAR; SUBCUTANEOUS WEEKLY
Qty: 4 ML | Refills: 5 | Status: SHIPPED | OUTPATIENT
Start: 2023-04-04

## 2023-04-04 RX ORDER — MECLIZINE HYDROCHLORIDE 25 MG/1
25 TABLET ORAL 3 TIMES DAILY PRN
Qty: 90 TABLET | Refills: 2 | Status: SHIPPED | OUTPATIENT
Start: 2023-04-04

## 2023-04-04 RX ORDER — FUROSEMIDE 20 MG/1
20 TABLET ORAL DAILY
Qty: 90 TABLET | Refills: 2 | Status: SHIPPED | OUTPATIENT
Start: 2023-04-04

## 2023-04-04 RX ORDER — LEVOTHYROXINE SODIUM 0.05 MG/1
50 TABLET ORAL DAILY
Qty: 30 TABLET | Refills: 5 | Status: SHIPPED | OUTPATIENT
Start: 2023-04-04 | End: 2023-05-08 | Stop reason: SDUPTHER

## 2023-04-04 RX ORDER — DOCUSATE SODIUM 100 MG/1
100 CAPSULE, LIQUID FILLED ORAL 2 TIMES DAILY
Qty: 60 CAPSULE | Refills: 2 | Status: SHIPPED | OUTPATIENT
Start: 2023-04-04 | End: 2023-07-12

## 2023-04-04 RX ORDER — SEMAGLUTIDE 1.34 MG/ML
0.25 INJECTION, SOLUTION SUBCUTANEOUS WEEKLY
Qty: 1.5 ML | Refills: 0 | Status: SHIPPED | OUTPATIENT
Start: 2023-04-04 | End: 2023-05-08 | Stop reason: DRUGHIGH

## 2023-04-04 NOTE — PROGRESS NOTES
"Subjective   Ekaterina Nelson is a 48 y.o. female.     Chief Complaint   Patient presents with   • review results       History of Present Illness     Lab review-Here to review updated labs.    Thyroid concern-noted to have abnormal T4.  She has had for at least 2 consecutive labs. She is experiencing fatigue, nail and hair changes.  She reports for at least a year.  She reports that \"since covid\".  She reports that her hair feels dry and brittle.     Anxiety-feels she is having some better days.  She is taking Wellbutrin and PRN Ativan.    GERD-she reports some increased constipation   Vertigo-reports that she has an occasional episode.  She has taken Meclizine in the past.  She reports she feels like she has a vibration in her face then her head \"starts spinning\".   She does at times feels she is getting pain in her left ear.   She reports that she has to stop and/or stand still till the sensation passes.    Palpitations-noted some increased fluttering that last most of the day for several days.  She reports she has never had the problem to last.  She is followed by [facility].  Last stress test was about 2 years ago.      The following portions of the patient's history were reviewed and updated as appropriate: CC, ROS, allergies, current medications, past family history, past medical history, past social history, past surgical history and problem list.      Review of Systems   Constitutional: Positive for fatigue. Negative for activity change, appetite change and fever.   HENT: Negative for congestion, ear pain, nosebleeds, postnasal drip, rhinorrhea, sore throat, tinnitus, trouble swallowing and voice change.    Eyes: Negative for blurred vision, photophobia and visual disturbance.   Respiratory: Negative for cough, chest tightness, shortness of breath and wheezing.    Cardiovascular: Negative for chest pain, palpitations and leg swelling.   Gastrointestinal: Negative for abdominal pain, blood in stool, " "constipation, diarrhea, nausea and GERD.   Endocrine: Negative for cold intolerance, heat intolerance and polydipsia.   Genitourinary: Negative for decreased urine volume, difficulty urinating, dysuria and hematuria.   Musculoskeletal: Negative for arthralgias, back pain, gait problem and myalgias.   Skin: Negative for color change, pallor and wound.   Allergic/Immunologic: Negative.    Neurological: Positive for dizziness. Negative for syncope, numbness and headache.   Hematological: Negative.    Psychiatric/Behavioral: Positive for depressed mood and stress. Negative for decreased concentration, self-injury, sleep disturbance and suicidal ideas. The patient is nervous/anxious.    All other systems reviewed and are negative.      Objective     /88   Pulse 98   Temp 98.4 °F (36.9 °C) (Temporal)   Resp 16   Ht 154.9 cm (60.98\")   Wt 82.8 kg (182 lb 9.6 oz)   SpO2 99%   BMI 34.52 kg/m²     Physical Exam  Vitals reviewed.   Constitutional:       General: She is not in acute distress.     Appearance: She is well-developed. She is obese. She is not diaphoretic.   HENT:      Head: Normocephalic and atraumatic.      Jaw: No tenderness.      Right Ear: Hearing, tympanic membrane, ear canal and external ear normal.      Left Ear: Hearing, tympanic membrane, ear canal and external ear normal.   Eyes:      General: Lids are normal. No scleral icterus.     Extraocular Movements:      Right eye: Normal extraocular motion and no nystagmus.      Left eye: Normal extraocular motion and no nystagmus.      Conjunctiva/sclera: Conjunctivae normal.      Pupils: Pupils are equal, round, and reactive to light.   Neck:      Thyroid: No thyromegaly or thyroid tenderness.      Vascular: No carotid bruit or JVD.      Trachea: No tracheal tenderness.   Cardiovascular:      Rate and Rhythm: Normal rate and regular rhythm.      Pulses:           Dorsalis pedis pulses are 2+ on the right side and 2+ on the left side.        " Posterior tibial pulses are 2+ on the right side and 2+ on the left side.      Heart sounds: Normal heart sounds, S1 normal and S2 normal. No murmur heard.  Pulmonary:      Effort: Pulmonary effort is normal. No accessory muscle usage, prolonged expiration or respiratory distress.      Breath sounds: Normal breath sounds.   Chest:      Chest wall: No tenderness.   Abdominal:      General: Bowel sounds are normal. There is no distension.      Palpations: Abdomen is soft. There is no hepatomegaly, splenomegaly or mass.      Tenderness: There is no abdominal tenderness.   Musculoskeletal:         General: Tenderness (multiple areas of generalized) present.      Cervical back: Normal range of motion and neck supple.      Right lower leg: No edema.      Left lower leg: No edema.      Comments: No muscular atrophy or flaccidity.   Lymphadenopathy:      Head:      Right side of head: No submental or submandibular adenopathy.      Left side of head: No submental or submandibular adenopathy.      Cervical: No cervical adenopathy.      Right cervical: No superficial cervical adenopathy.     Left cervical: No superficial cervical adenopathy.   Skin:     General: Skin is warm and dry.      Capillary Refill: Capillary refill takes less than 2 seconds.      Coloration: Skin is not jaundiced or pale.      Findings: No erythema.      Nails: There is no clubbing.   Neurological:      Mental Status: She is alert and oriented to person, place, and time.      Cranial Nerves: No cranial nerve deficit or facial asymmetry.      Sensory: No sensory deficit.      Motor: No weakness, tremor, atrophy or abnormal muscle tone.      Coordination: Coordination normal.      Gait: Gait abnormal (mild antalgia).      Deep Tendon Reflexes: Reflexes are normal and symmetric.   Psychiatric:         Attention and Perception: She is attentive.         Mood and Affect: Mood is anxious. Mood is not depressed.         Speech: Speech normal.          Behavior: Behavior normal. Behavior is cooperative.         Thought Content: Thought content normal.         Cognition and Memory: Cognition normal.         Judgment: Judgment normal.           Diagnoses and all orders for this visit:    1. Abnormal glucose (Primary)  Comments:  A1c 5.9.  Patient with significant CVD history of MI.   Orders:  -     Semaglutide,0.25 or 0.5MG/DOS, (Ozempic, 0.25 or 0.5 MG/DOSE,) 2 MG/1.5ML solution pen-injector; Inject 0.25 mg under the skin into the appropriate area as directed 1 (One) Time Per Week.  Dispense: 1.5 mL; Refill: 0    2. Essential hypertension  Comments:  Continue lisinopril 20 mg.  Monitor BP at home and report elevations  Orders:  -     furosemide (Lasix) 20 MG tablet; Take 1 tablet by mouth Daily.  Dispense: 90 tablet; Refill: 2    3. Anxiety disorder due to general medical condition with panic attack  Comments:  Continue Ativan as directed.  Continue Wellbutrin 300 mg.  Continue to work on stress reduction  Orders:  -     LORazepam (ATIVAN) 1 MG tablet; Take 1 tablet by mouth Daily As Needed for Anxiety.  Dispense: 30 tablet; Refill: 0  -     buPROPion XL (WELLBUTRIN XL) 300 MG 24 hr tablet; Take 1 tablet by mouth Every Morning.  Dispense: 90 tablet; Refill: 1    4. Vitamin B 12 deficiency  -     cyanocobalamin 1000 MCG/ML injection; Inject 1 mL into the appropriate muscle as directed by prescriber 1 (One) Time Per Week.  Dispense: 4 mL; Refill: 5    5. Thyroiditis  Comments:  Suspected due to abnormal labs and history of COVID infection.  We will begin work-up of thyroid  Orders:  -     levothyroxine (Synthroid) 50 MCG tablet; Take 1 tablet by mouth Daily.  Dispense: 30 tablet; Refill: 5    6. Vertigo  Comments:  We will provide meclizine.  Orders:  -     meclizine (ANTIVERT) 25 MG tablet; Take 1 tablet by mouth 3 (Three) Times a Day As Needed for Dizziness.  Dispense: 90 tablet; Refill: 2    7. Abnormal thyroid blood test  -     US Thyroid    8. Slow transit  constipation  Comments:  Bowel care advised:  Increase PO fluids for hydration, exercise regularly, Increase fiber.  Stool softeners PRN  Orders:  -     docusate sodium (Colace) 100 MG capsule; Take 1 capsule by mouth 2 (Two) Times a Day.  Dispense: 60 capsule; Refill: 2    9. Encounter for screening mammogram for malignant neoplasm of breast  -     Mammo Screening Digital Tomosynthesis Bilateral With CAD    10. Class 1 obesity due to excess calories with serious comorbidity and body mass index (BMI) of 34.0 to 34.9 in adult  Comments:  be active as able.  Work on diet changes         BMI is >= 30 and <35. (Class 1 Obesity). The following options were offered after discussion;: nutrition counseling/recommendations       Understands disease processes and need for medications.  Understands reasons for urgent and emergent care.  Patient (& family) verbalized agreement for treatment plan.   Emotional support and active listening provided.  Patient provided time to verbalize feelings.    Reviewed labs from 03/29/2023.  Discussed findings.    RTC 1 month, sooner if needed.             This document has been electronically signed by:  ILEANA Vega, FNP-C    Dragon disclaimer:  Part of this note may be an electronic transcription/translation of spoken language to printed text using the Dragon Dictation System.

## 2023-04-05 ENCOUNTER — TELEPHONE (OUTPATIENT)
Dept: FAMILY MEDICINE CLINIC | Facility: CLINIC | Age: 48
End: 2023-04-05

## 2023-04-05 ENCOUNTER — PRIOR AUTHORIZATION (OUTPATIENT)
Dept: FAMILY MEDICINE CLINIC | Facility: CLINIC | Age: 48
End: 2023-04-05
Payer: COMMERCIAL

## 2023-04-05 NOTE — TELEPHONE ENCOUNTER
Spoke with patient and let her know the PA has been started on this but it may take a couple of days to come back since it was submitted by fax. Patient is in understanding.

## 2023-04-05 NOTE — TELEPHONE ENCOUNTER
Caller: Ekaterina Nelson    Relationship: Self    Best call back number: [phone]     What was the call regarding: PATIENT CALLED TO REQUEST A PRIOR AUTHROIZATION FOR Semaglutide,0.25 or 0.5MG/DOS, (Ozempic, 0.25 or 0.5 MG/DOSE,) 2 MG/1.5ML solution pen-injector    Do you require a callback: YES

## 2023-04-18 ENCOUNTER — HOSPITAL ENCOUNTER (OUTPATIENT)
Dept: ULTRASOUND IMAGING | Facility: HOSPITAL | Age: 48
Discharge: HOME | End: 2023-04-18
Admitting: NURSE PRACTITIONER
Payer: COMMERCIAL

## 2023-04-18 PROCEDURE — 76536 US EXAM OF HEAD AND NECK: CPT

## 2023-04-18 PROCEDURE — 76536 US EXAM OF HEAD AND NECK: CPT | Performed by: RADIOLOGY

## 2023-05-08 ENCOUNTER — OFFICE VISIT (OUTPATIENT)
Dept: FAMILY MEDICINE CLINIC | Facility: CLINIC | Age: 48
End: 2023-05-08
Payer: COMMERCIAL

## 2023-05-08 VITALS
BODY MASS INDEX: 32.21 KG/M2 | SYSTOLIC BLOOD PRESSURE: 126 MMHG | HEIGHT: 61 IN | RESPIRATION RATE: 14 BRPM | DIASTOLIC BLOOD PRESSURE: 82 MMHG | HEART RATE: 70 BPM | OXYGEN SATURATION: 98 % | TEMPERATURE: 98.6 F | WEIGHT: 170.6 LBS

## 2023-05-08 DIAGNOSIS — R73.09 ABNORMAL GLUCOSE: ICD-10-CM

## 2023-05-08 DIAGNOSIS — K59.04 CHRONIC IDIOPATHIC CONSTIPATION: ICD-10-CM

## 2023-05-08 DIAGNOSIS — E06.9 THYROIDITIS: Primary | ICD-10-CM

## 2023-05-08 DIAGNOSIS — Z79.899 LONG-TERM USE OF HIGH-RISK MEDICATION: ICD-10-CM

## 2023-05-08 DIAGNOSIS — F06.4 ANXIETY DISORDER DUE TO GENERAL MEDICAL CONDITION WITH PANIC ATTACK: ICD-10-CM

## 2023-05-08 DIAGNOSIS — F41.0 ANXIETY DISORDER DUE TO GENERAL MEDICAL CONDITION WITH PANIC ATTACK: ICD-10-CM

## 2023-05-08 PROCEDURE — 1160F RVW MEDS BY RX/DR IN RCRD: CPT | Performed by: NURSE PRACTITIONER

## 2023-05-08 PROCEDURE — 1159F MED LIST DOCD IN RCRD: CPT | Performed by: NURSE PRACTITIONER

## 2023-05-08 PROCEDURE — 3074F SYST BP LT 130 MM HG: CPT | Performed by: NURSE PRACTITIONER

## 2023-05-08 PROCEDURE — 3079F DIAST BP 80-89 MM HG: CPT | Performed by: NURSE PRACTITIONER

## 2023-05-08 PROCEDURE — 99214 OFFICE O/P EST MOD 30 MIN: CPT | Performed by: NURSE PRACTITIONER

## 2023-05-08 RX ORDER — LEVOTHYROXINE SODIUM 0.05 MG/1
50 TABLET ORAL DAILY
Qty: 30 TABLET | Refills: 5 | Status: SHIPPED | OUTPATIENT
Start: 2023-05-08

## 2023-05-08 RX ORDER — SEMAGLUTIDE 1.34 MG/ML
0.5 INJECTION, SOLUTION SUBCUTANEOUS WEEKLY
Qty: 1.5 ML | Refills: 0 | Status: SHIPPED | OUTPATIENT
Start: 2023-05-08

## 2023-05-08 RX ORDER — LORAZEPAM 1 MG/1
1 TABLET ORAL DAILY PRN
Qty: 30 TABLET | Refills: 0 | Status: SHIPPED | OUTPATIENT
Start: 2023-05-08

## 2023-05-08 NOTE — PROGRESS NOTES
Subjective   Ekaterina Nelson is a 48 y.o. female.     Chief Complaint   Patient presents with   • Hypertension     Answers for HPI/ROS submitted by the patient on 5/1/2023  Please describe your symptoms.: Follow up appointment  Have you had these symptoms before?: Yes  How long have you been having these symptoms?: Greater than 2 weeks  Please list any medications you are currently taking for this condition.: NA  Please describe any probable cause for these symptoms. : NA  What is the primary reason for your visit?: Other      History of Present Illness     Hypertension-chronic and ongoing.  Patient is currently taking Lasix 20 mg and lisinopril 20 mg daily.  She is followed by cardiology.  Blood pressure is stable today.  She reports palpitations have improved.  No CP.    Abnormal glucose-patient was started on Ozempic 0.25 mg weekly after elevated blood sugars were evident on her labs.  Due to patient's cardiac history it is felt that she would benefit from the use of Ozempic to reduce cardiovascular outcomes as well and has decreased her weight.  She has taken 0.25 mg x's 2 weeks and then 0.5 mg x's 2 injections.    Hyperlipidemia-ongoing.  Patient is currently on Crestor 20 mg.  No negative side effects.  Patient denies any negative side effects of cholesterol medication.  No reported myalgia or myopathies.  Hypothyroidism-chronic and ongoing.  Patient is currently taking Synthroid 50 mcg.  She has suspected thyroid habits with weight gain.  She has been out of meds for 2 days she reports.  She has noted some increased energy.  She feels her mood is better overall.  She reports she has been more active at home as well as outdoors.      The following portions of the patient's history were reviewed and updated as appropriate: CC, ROS, allergies, current medications, past family history, past medical history, past social history, past surgical history and problem list.      Review of Systems   Constitutional: Positive  "for fatigue. Negative for appetite change and fever.   HENT: Negative for congestion, ear pain, nosebleeds, postnasal drip, rhinorrhea, sore throat, tinnitus, trouble swallowing and voice change.    Eyes: Negative for blurred vision, photophobia and visual disturbance.   Respiratory: Negative for cough, chest tightness, shortness of breath and wheezing.    Cardiovascular: Negative for chest pain and palpitations.   Gastrointestinal: Negative for abdominal pain, blood in stool, constipation, diarrhea, nausea and GERD.   Endocrine: Negative for cold intolerance, heat intolerance and polydipsia.   Genitourinary: Negative for decreased urine volume, difficulty urinating, dysuria and hematuria.   Musculoskeletal: Positive for arthralgias and myalgias. Negative for back pain and gait problem.   Skin: Negative for color change, pallor and wound.   Allergic/Immunologic: Negative.    Neurological: Negative for dizziness, syncope, numbness and headache.   Hematological: Negative.    Psychiatric/Behavioral: Negative for decreased concentration, sleep disturbance, suicidal ideas and depressed mood. The patient is not nervous/anxious.    All other systems reviewed and are negative.      Objective     /82   Pulse 70   Temp 98.6 °F (37 °C)   Resp 14   Ht 154.9 cm (60.98\")   Wt 77.4 kg (170 lb 9.6 oz)   SpO2 98%   BMI 32.25 kg/m²     Physical Exam  Vitals reviewed.   Constitutional:       General: She is not in acute distress.     Appearance: She is well-developed. She is not diaphoretic.   HENT:      Head: Normocephalic and atraumatic.      Jaw: No tenderness.      Right Ear: Hearing, tympanic membrane, ear canal and external ear normal.      Left Ear: Hearing, tympanic membrane, ear canal and external ear normal.      Nose: Nose normal. No nasal tenderness or congestion.      Right Sinus: No maxillary sinus tenderness or frontal sinus tenderness.      Left Sinus: No maxillary sinus tenderness or frontal sinus " tenderness.      Mouth/Throat:      Lips: Pink.      Mouth: Mucous membranes are moist.      Pharynx: Oropharynx is clear. Uvula midline.   Eyes:      General: Lids are normal. No scleral icterus.     Extraocular Movements:      Right eye: Normal extraocular motion and no nystagmus.      Left eye: Normal extraocular motion and no nystagmus.      Conjunctiva/sclera: Conjunctivae normal.      Pupils: Pupils are equal, round, and reactive to light.   Neck:      Thyroid: No thyromegaly or thyroid tenderness.      Vascular: No carotid bruit or JVD.      Trachea: No tracheal tenderness.   Cardiovascular:      Rate and Rhythm: Normal rate and regular rhythm.      Pulses:           Dorsalis pedis pulses are 2+ on the right side and 2+ on the left side.        Posterior tibial pulses are 2+ on the right side and 2+ on the left side.      Heart sounds: Normal heart sounds, S1 normal and S2 normal. No murmur heard.  Pulmonary:      Effort: Pulmonary effort is normal. No accessory muscle usage, prolonged expiration or respiratory distress.      Breath sounds: Normal breath sounds.   Chest:      Chest wall: No tenderness.   Abdominal:      General: Bowel sounds are normal. There is no distension.      Palpations: Abdomen is soft. There is no hepatomegaly, splenomegaly or mass.      Tenderness: There is no abdominal tenderness.   Musculoskeletal:         General: Tenderness (multiple areas of myalgia) present.      Cervical back: Normal range of motion and neck supple.      Right lower leg: No edema.      Left lower leg: No edema.      Comments: No muscular atrophy or flaccidity.   Lymphadenopathy:      Head:      Right side of head: No submental or submandibular adenopathy.      Left side of head: No submental or submandibular adenopathy.      Cervical: No cervical adenopathy.      Right cervical: No superficial cervical adenopathy.     Left cervical: No superficial cervical adenopathy.   Skin:     General: Skin is warm and dry.       Capillary Refill: Capillary refill takes less than 2 seconds.      Coloration: Skin is not jaundiced or pale.      Findings: No erythema.      Nails: There is no clubbing.   Neurological:      Mental Status: She is alert and oriented to person, place, and time.      Cranial Nerves: No cranial nerve deficit or facial asymmetry.      Sensory: No sensory deficit.      Motor: No weakness, tremor, atrophy or abnormal muscle tone.      Coordination: Coordination normal.      Gait: Gait abnormal (mild antalgia).      Deep Tendon Reflexes: Reflexes are normal and symmetric.   Psychiatric:         Attention and Perception: She is attentive.         Mood and Affect: Mood normal. Mood is not anxious or depressed.         Speech: Speech normal.         Behavior: Behavior normal. Behavior is cooperative.         Thought Content: Thought content normal.         Cognition and Memory: Cognition normal.         Judgment: Judgment normal.           Diagnoses and all orders for this visit:    1. Thyroiditis (Primary)  Comments:  Suspected due to abnormal labs and history of COVID infection.  We will begin work-up of thyroid  Orders:  -     levothyroxine (Synthroid) 50 MCG tablet; Take 1 tablet by mouth Daily.  Dispense: 30 tablet; Refill: 5    2. Chronic idiopathic constipation  Comments:  trial of Linzess.  continue to push fluids.    Orders:  -     linaclotide (Linzess) 72 MCG capsule capsule; Take 1 capsule by mouth Every Morning Before Breakfast.  Dispense: 30 capsule; Refill: 5    3. Abnormal glucose  Comments:  increased in Ozempic to 0.5 mg.  Orders:  -     Semaglutide,0.25 or 0.5MG/DOS, (Ozempic, 0.25 or 0.5 MG/DOSE,) 2 MG/1.5ML solution pen-injector; Inject 0.5 mg under the skin into the appropriate area as directed 1 (One) Time Per Week.  Dispense: 1.5 mL; Refill: 0    4. Anxiety disorder due to general medical condition with panic attack  Comments:  Continue Ativan as directed.  Continue Wellbutrin 300 mg.  Continue to  work on stress reduction  Orders:  -     LORazepam (ATIVAN) 1 MG tablet; Take 1 tablet by mouth Daily As Needed for Anxiety.  Dispense: 30 tablet; Refill: 0    5. Long-term use of high-risk medication  -     Urine Drug Screen - Urine, Clean Catch; Future        BMI is >= 30 and <35. (Class 1 Obesity). The following options were offered after discussion;: nutrition counseling/recommendations       Understands disease processes and need for medications.  Understands reasons for urgent and emergent care.  Patient (& family) verbalized agreement for treatment plan.   Emotional support and active listening provided.  Patient provided time to verbalize feelings.    EILEEN/PMDP reviewed today and consistent.  Will refill prescribed controlled medication today.  Patient is aware they cannot receive narcotics from any other provider except if under care of pain management or speciality clinic.  Risk and benefits of medication use has been reviewed.  History and physical exam exhibit continued safe and appropriate use of controlled substances.  The patient is aware of the potential for addiction and dependence.  This patient has been made aware of the appropriate use of such medications, including potential risk of somnolence, limited ability to drive and / or work safely, and potential for overdose.    It has also been made clear that these medications are for use by this patient only, without concomitant use of alcohol or other substances unless prescribed/advised by medical provider.  Patient understands they may be subject to UDS and pill counts at random.      Patient considered to be low risk for addiction due to use of single controlled medications.  Patient understands and accepts these risks.  Patient need for medication will be reassessed at each visit.  Doses will be adjusted according to patient need and findings.    Goal of TX: Patient will not have any adverse reactions of medication.  Patient have reduction in  anxiety symptoms with use of PRN Ativan as directed.  Patient will be able to remain active in an outside of home with use of Ativan for anxiety symptoms    Medication Dispense Information    Lorazepam   Dispensed: 2023 12:00 AM   Written:  2023   Unit strength: 1MG   Days supply: 30   Quantity: 30 each   Refills remainin   Pharmacy: [facility]   Authorizing provider: SORAIDA CRYSTAL   Received from: [facility] PDMP (Fill History)   Brand or Generic:       RTC 1 month, sooner if needed.             This document has been electronically signed by:  ILEANA Vega, LYNNC    Dragon disclaimer:  Part of this note may be an electronic transcription/translation of spoken language to printed text using the Dragon Dictation System.

## 2023-06-08 ENCOUNTER — OFFICE VISIT (OUTPATIENT)
Dept: FAMILY MEDICINE CLINIC | Facility: CLINIC | Age: 48
End: 2023-06-08
Payer: COMMERCIAL

## 2023-06-08 VITALS
BODY MASS INDEX: 30.21 KG/M2 | WEIGHT: 160 LBS | DIASTOLIC BLOOD PRESSURE: 80 MMHG | RESPIRATION RATE: 14 BRPM | OXYGEN SATURATION: 98 % | HEART RATE: 64 BPM | SYSTOLIC BLOOD PRESSURE: 124 MMHG | HEIGHT: 61 IN | TEMPERATURE: 98.4 F

## 2023-06-08 DIAGNOSIS — E66.9 OBESITY (BMI 30-39.9): Chronic | ICD-10-CM

## 2023-06-08 DIAGNOSIS — R53.82 CHRONIC FATIGUE: Chronic | ICD-10-CM

## 2023-06-08 DIAGNOSIS — K59.04 CHRONIC IDIOPATHIC CONSTIPATION: ICD-10-CM

## 2023-06-08 DIAGNOSIS — I10 ESSENTIAL HYPERTENSION: Chronic | ICD-10-CM

## 2023-06-08 DIAGNOSIS — R73.09 ABNORMAL GLUCOSE: Primary | ICD-10-CM

## 2023-06-08 PROCEDURE — 1159F MED LIST DOCD IN RCRD: CPT | Performed by: NURSE PRACTITIONER

## 2023-06-08 PROCEDURE — 3074F SYST BP LT 130 MM HG: CPT | Performed by: NURSE PRACTITIONER

## 2023-06-08 PROCEDURE — 99214 OFFICE O/P EST MOD 30 MIN: CPT | Performed by: NURSE PRACTITIONER

## 2023-06-08 PROCEDURE — 3079F DIAST BP 80-89 MM HG: CPT | Performed by: NURSE PRACTITIONER

## 2023-06-08 PROCEDURE — 1160F RVW MEDS BY RX/DR IN RCRD: CPT | Performed by: NURSE PRACTITIONER

## 2023-06-08 RX ORDER — MAGNESIUM OXIDE 400 MG/1
400 TABLET ORAL DAILY
Qty: 30 TABLET | Refills: 5 | Status: SHIPPED | OUTPATIENT
Start: 2023-06-08

## 2023-06-08 RX ORDER — SEMAGLUTIDE 1.34 MG/ML
1 INJECTION, SOLUTION SUBCUTANEOUS WEEKLY
Qty: 3 ML | Refills: 2 | Status: SHIPPED | OUTPATIENT
Start: 2023-06-08

## 2023-06-08 NOTE — PROGRESS NOTES
Subjective   Ekaterina Nelson is a 48 y.o. female.     Chief Complaint   Patient presents with    Hypertension       History of Present Illness     Hypertension-chronic and ongoing.  Patient is currently taking lisinopril 20 mg and Lasix 20 mg.  She denies palpitations and CP.    Hypothyroidism-chronic ongoing.  Patient is currently taking Synthroid 50 mcg.  No negative side effects.  No associated symptoms such as heat or cold intolerance.  No hair or skin changes are reported.  Abnormal glucose-patient is currently taking Ozempic 0.5 mg.  She has been working on diet changes and weight reduction.  She is tolerating Ozempic well.  She continues to have some mild decrease of her appetite.  She is drinking more water when she feels hungry.  She is drinking protein shakes.  She reports only occasional nausea.    GERD-chronic and ongoing.  Patient is currently taking Prilosec 40 mg capsule daily.  She has been ordered Colace for constipation.  She denies any abdominal concerns.    Depression and Anxiety-She reports that she is doing well.  She is taking Wellbutrin 300 mg.  She is on Ativan 1 mg daily PRN.      The following portions of the patient's history were reviewed and updated as appropriate: CC, ROS, allergies, current medications, past family history, past medical history, past social history, past surgical history and problem list.    Review of Systems   Constitutional:  Positive for fatigue. Negative for appetite change and fever.   HENT:  Negative for congestion, ear pain, nosebleeds, postnasal drip, rhinorrhea, sore throat, tinnitus, trouble swallowing and voice change.    Eyes:  Negative for blurred vision, photophobia and visual disturbance.   Respiratory:  Negative for cough, chest tightness, shortness of breath and wheezing.    Cardiovascular:  Negative for chest pain and palpitations.   Gastrointestinal:  Negative for abdominal pain, blood in stool, constipation, diarrhea, nausea and GERD.   Endocrine:  "Negative for cold intolerance, heat intolerance and polydipsia.   Genitourinary:  Negative for decreased urine volume, difficulty urinating, dysuria and hematuria.   Musculoskeletal:  Negative for arthralgias, back pain, gait problem and myalgias.   Skin:  Negative for color change, pallor and wound.   Allergic/Immunologic: Negative.    Neurological:  Negative for dizziness, syncope, numbness and headache.   Hematological: Negative.    Psychiatric/Behavioral:  Negative for decreased concentration, sleep disturbance, suicidal ideas and depressed mood. The patient is not nervous/anxious.    All other systems reviewed and are negative.    Objective     /80   Pulse 64   Temp 98.4 °F (36.9 °C)   Resp 14   Ht 154.9 cm (60.98\")   Wt 72.6 kg (160 lb)   SpO2 98%   BMI 30.25 kg/m²     Physical Exam  Vitals reviewed.   Constitutional:       General: She is not in acute distress.     Appearance: She is well-developed. She is not diaphoretic.   HENT:      Head: Normocephalic and atraumatic.      Jaw: No tenderness.      Right Ear: Hearing, tympanic membrane, ear canal and external ear normal.      Left Ear: Hearing, tympanic membrane, ear canal and external ear normal.      Nose: Nose normal. No nasal tenderness or congestion.      Right Sinus: No maxillary sinus tenderness or frontal sinus tenderness.      Left Sinus: No maxillary sinus tenderness or frontal sinus tenderness.      Mouth/Throat:      Lips: Pink.      Mouth: Mucous membranes are moist.      Pharynx: Oropharynx is clear. Uvula midline.   Eyes:      General: Lids are normal. No scleral icterus.     Extraocular Movements:      Right eye: Normal extraocular motion and no nystagmus.      Left eye: Normal extraocular motion and no nystagmus.      Conjunctiva/sclera: Conjunctivae normal.      Pupils: Pupils are equal, round, and reactive to light.   Neck:      Thyroid: No thyromegaly or thyroid tenderness.      Vascular: No carotid bruit or JVD.      " Trachea: No tracheal tenderness.   Cardiovascular:      Rate and Rhythm: Normal rate and regular rhythm.      Pulses:           Dorsalis pedis pulses are 2+ on the right side and 2+ on the left side.        Posterior tibial pulses are 2+ on the right side and 2+ on the left side.      Heart sounds: Normal heart sounds, S1 normal and S2 normal. No murmur heard.  Pulmonary:      Effort: Pulmonary effort is normal. No accessory muscle usage, prolonged expiration or respiratory distress.      Breath sounds: Normal breath sounds.   Chest:      Chest wall: No tenderness.   Abdominal:      General: Bowel sounds are normal. There is no distension.      Palpations: Abdomen is soft. There is no hepatomegaly, splenomegaly or mass.      Tenderness: There is no abdominal tenderness.   Musculoskeletal:         General: Tenderness (multiple areas of myalgia) present.      Cervical back: Normal range of motion and neck supple.      Right lower leg: No edema.      Left lower leg: No edema.      Comments: No muscular atrophy or flaccidity.   Lymphadenopathy:      Head:      Right side of head: No submental or submandibular adenopathy.      Left side of head: No submental or submandibular adenopathy.      Cervical: No cervical adenopathy.      Right cervical: No superficial cervical adenopathy.     Left cervical: No superficial cervical adenopathy.   Skin:     General: Skin is warm and dry.      Capillary Refill: Capillary refill takes less than 2 seconds.      Coloration: Skin is not jaundiced or pale.      Findings: No erythema.      Nails: There is no clubbing.   Neurological:      Mental Status: She is alert and oriented to person, place, and time.      Cranial Nerves: No cranial nerve deficit or facial asymmetry.      Sensory: No sensory deficit.      Motor: No weakness, tremor, atrophy or abnormal muscle tone.      Coordination: Coordination normal.      Gait: Gait abnormal (mild antalgia).      Deep Tendon Reflexes: Reflexes  are normal and symmetric.   Psychiatric:         Attention and Perception: She is attentive.         Mood and Affect: Mood normal. Mood is not anxious or depressed.         Speech: Speech normal.         Behavior: Behavior normal. Behavior is cooperative.         Thought Content: Thought content normal.         Cognition and Memory: Cognition normal.         Judgment: Judgment normal.         Diagnoses and all orders for this visit:    1. Abnormal glucose (Primary)  -     Semaglutide, 1 MG/DOSE, (Ozempic, 1 MG/DOSE,) 4 MG/3ML solution pen-injector; Inject 1 mg under the skin into the appropriate area as directed 1 (One) Time Per Week.  Dispense: 3 mL; Refill: 2    2. Essential hypertension  Assessment & Plan:  Continue lisinopril 20 mg. Report any negative side effects.  Ambulatory BP monitoring either at home or random community checks.  Patient to report continued elevations >140/90.  Patient may come by office for checks if needed.         3. Obesity (BMI 30-39.9)  Assessment & Plan:  Continue to work on weight loss.      4. Chronic fatigue  Assessment & Plan:  Continue to be active      5. Chronic idiopathic constipation  Comments:  trial of Linzess.  continue to push fluids.    Orders:  -     magnesium oxide (MAG-OX) 400 MG tablet; Take 1 tablet by mouth Daily.  Dispense: 30 tablet; Refill: 5         Understands disease processes and need for medications.  Understands reasons for urgent and emergent care.  Patient (& family) verbalized agreement for treatment plan.   Emotional support and active listening provided.  Patient provided time to verbalize feelings.    RTC 2 months, sooner if needed.               This document has been electronically signed by:  ILEANA Vega, FNP-C    Dragon disclaimer:  Part of this note may be an electronic transcription/translation of spoken language to printed text using the Dragon Dictation System.

## 2023-08-08 ENCOUNTER — OFFICE VISIT (OUTPATIENT)
Dept: FAMILY MEDICINE CLINIC | Facility: CLINIC | Age: 48
End: 2023-08-08
Payer: COMMERCIAL

## 2023-08-08 VITALS
SYSTOLIC BLOOD PRESSURE: 130 MMHG | OXYGEN SATURATION: 99 % | BODY MASS INDEX: 27.19 KG/M2 | TEMPERATURE: 98.4 F | DIASTOLIC BLOOD PRESSURE: 82 MMHG | WEIGHT: 144 LBS | HEIGHT: 61 IN | RESPIRATION RATE: 14 BRPM | HEART RATE: 62 BPM

## 2023-08-08 DIAGNOSIS — F41.0 ANXIETY DISORDER DUE TO GENERAL MEDICAL CONDITION WITH PANIC ATTACK: ICD-10-CM

## 2023-08-08 DIAGNOSIS — E06.9 THYROIDITIS: Primary | ICD-10-CM

## 2023-08-08 DIAGNOSIS — R53.82 CHRONIC FATIGUE: ICD-10-CM

## 2023-08-08 DIAGNOSIS — R30.0 DYSURIA: ICD-10-CM

## 2023-08-08 DIAGNOSIS — R73.09 ABNORMAL GLUCOSE: ICD-10-CM

## 2023-08-08 DIAGNOSIS — F06.4 ANXIETY DISORDER DUE TO GENERAL MEDICAL CONDITION WITH PANIC ATTACK: ICD-10-CM

## 2023-08-08 LAB
BILIRUB BLD-MCNC: NEGATIVE MG/DL
CLARITY, POC: ABNORMAL
COLOR UR: YELLOW
EXPIRATION DATE: ABNORMAL
GLUCOSE UR STRIP-MCNC: NEGATIVE MG/DL
KETONES UR QL: NEGATIVE
LEUKOCYTE EST, POC: NEGATIVE
Lab: ABNORMAL
NITRITE UR-MCNC: NEGATIVE MG/ML
PH UR: 6 [PH] (ref 5–8)
PROT UR STRIP-MCNC: NEGATIVE MG/DL
RBC # UR STRIP: ABNORMAL /UL
SP GR UR: 1.01 (ref 1–1.03)
UROBILINOGEN UR QL: ABNORMAL

## 2023-08-08 PROCEDURE — 3079F DIAST BP 80-89 MM HG: CPT | Performed by: NURSE PRACTITIONER

## 2023-08-08 PROCEDURE — 80050 GENERAL HEALTH PANEL: CPT | Performed by: NURSE PRACTITIONER

## 2023-08-08 PROCEDURE — 1159F MED LIST DOCD IN RCRD: CPT | Performed by: NURSE PRACTITIONER

## 2023-08-08 PROCEDURE — 1160F RVW MEDS BY RX/DR IN RCRD: CPT | Performed by: NURSE PRACTITIONER

## 2023-08-08 PROCEDURE — 82607 VITAMIN B-12: CPT | Performed by: NURSE PRACTITIONER

## 2023-08-08 PROCEDURE — 83036 HEMOGLOBIN GLYCOSYLATED A1C: CPT | Performed by: NURSE PRACTITIONER

## 2023-08-08 PROCEDURE — 99214 OFFICE O/P EST MOD 30 MIN: CPT | Performed by: NURSE PRACTITIONER

## 2023-08-08 PROCEDURE — 82306 VITAMIN D 25 HYDROXY: CPT | Performed by: NURSE PRACTITIONER

## 2023-08-08 PROCEDURE — 3075F SYST BP GE 130 - 139MM HG: CPT | Performed by: NURSE PRACTITIONER

## 2023-08-08 PROCEDURE — 84439 ASSAY OF FREE THYROXINE: CPT | Performed by: NURSE PRACTITIONER

## 2023-08-08 RX ORDER — LORAZEPAM 1 MG/1
1 TABLET ORAL DAILY PRN
Qty: 30 TABLET | Refills: 0 | Status: SHIPPED | OUTPATIENT
Start: 2023-08-08

## 2023-08-08 NOTE — PROGRESS NOTES
Subjective   Ekaterina Nelson is a 48 y.o. female.     Chief Complaint   Patient presents with    Weight Loss       Weight Loss  Associated symptoms include fatigue and myalgias. Pertinent negatives include no abdominal pain, arthralgias, chest pain, congestion, coughing, fever, nausea, numbness or sore throat.      Weight loss and pre-diabetes-continues on ozempic.  She reports that she is eating a good variety.   She reports just eating less of her usual intake.  Less sweets.    Energy concerns-reports she has noted some decreased energy.  She also feels her mood is somewhat decreased.  She has not missed any Wellbutrin.  She has PRN ativan for anxiety.  She is taking B12 injection and vit D.    Magnesium-reports she stopped it due to nausea.    Back/rib discomfort-she has noted that she has a sensation of something pulling. She reports that since she has lost weight she has noted that her spine is crooked and her tailbone is crooked. She reports that she did have a fall when she had Covid.  She is noting the discomfort 2-3 times per day at least.    Dysuria-pelvic pressure. For a couple of weeks.  No blood in urine.  Some burning with urination.  Feels she is emptying fine.  No hesitancy.  Just pressure.    Dizziness-has stopped her lisinopril due to having dizziness and low blood pressure readings at home.  She reports that she was feeling near syncope at times.     The following portions of the patient's history were reviewed and updated as appropriate: CC, ROS, allergies, current medications, past family history, past medical history, past social history, past surgical history and problem list.      Review of Systems   Constitutional:  Positive for appetite change (decreased on Ozempic) and fatigue. Negative for fever.   HENT:  Negative for congestion, ear pain, nosebleeds, postnasal drip, rhinorrhea, sore throat, tinnitus, trouble swallowing and voice change.    Eyes:  Negative for blurred vision, photophobia and  "visual disturbance.   Respiratory:  Negative for cough, chest tightness, shortness of breath and wheezing.    Cardiovascular:  Positive for leg swelling. Negative for chest pain and palpitations.   Gastrointestinal:  Negative for abdominal pain, blood in stool, constipation, diarrhea, nausea and GERD.   Endocrine: Negative for cold intolerance, heat intolerance and polydipsia.   Genitourinary:  Positive for dysuria and pelvic pain. Negative for decreased urine volume, difficulty urinating and hematuria.   Musculoskeletal:  Positive for myalgias. Negative for arthralgias, back pain and gait problem.   Skin:  Negative for color change, pallor and wound.   Allergic/Immunologic: Negative.    Neurological:  Positive for headache. Negative for dizziness, syncope and numbness.   Hematological: Negative.    Psychiatric/Behavioral:  Positive for depressed mood (increased \"low mood\"). Negative for decreased concentration, sleep disturbance and suicidal ideas. The patient is not nervous/anxious.    All other systems reviewed and are negative.    Objective     /82   Pulse 62   Temp 98.4 øF (36.9 øC)   Resp 14   Ht 154.9 cm (60.98\")   Wt 65.3 kg (144 lb)   SpO2 99%   BMI 27.22 kg/mý     Physical Exam  Vitals reviewed.   Constitutional:       General: She is not in acute distress.     Appearance: She is well-developed. She is not diaphoretic.   HENT:      Head: Normocephalic and atraumatic.      Jaw: No tenderness.      Right Ear: Hearing, tympanic membrane, ear canal and external ear normal.      Left Ear: Hearing, tympanic membrane, ear canal and external ear normal.      Nose: Nose normal. No nasal tenderness or congestion.      Right Sinus: No maxillary sinus tenderness or frontal sinus tenderness.      Left Sinus: No maxillary sinus tenderness or frontal sinus tenderness.      Mouth/Throat:      Lips: Pink.      Mouth: Mucous membranes are moist.      Pharynx: Oropharynx is clear. Uvula midline.   Eyes:      " General: Lids are normal. No scleral icterus.     Extraocular Movements:      Right eye: Normal extraocular motion and no nystagmus.      Left eye: Normal extraocular motion and no nystagmus.      Conjunctiva/sclera: Conjunctivae normal.      Pupils: Pupils are equal, round, and reactive to light.   Neck:      Thyroid: No thyromegaly or thyroid tenderness.      Vascular: No carotid bruit or JVD.      Trachea: No tracheal tenderness.   Cardiovascular:      Rate and Rhythm: Normal rate and regular rhythm.      Pulses:           Dorsalis pedis pulses are 2+ on the right side and 2+ on the left side.        Posterior tibial pulses are 2+ on the right side and 2+ on the left side.      Heart sounds: Normal heart sounds, S1 normal and S2 normal. No murmur heard.  Pulmonary:      Effort: Pulmonary effort is normal. No accessory muscle usage, prolonged expiration or respiratory distress.      Breath sounds: Normal breath sounds.   Chest:      Chest wall: No tenderness.   Abdominal:      General: Bowel sounds are normal. There is no distension.      Palpations: Abdomen is soft. There is no hepatomegaly, splenomegaly or mass.      Tenderness: There is no abdominal tenderness.   Musculoskeletal:         General: Tenderness (multiple areas of myalgia) present.      Cervical back: Normal range of motion and neck supple.      Right lower leg: No edema.      Left lower leg: No edema.      Comments: No muscular atrophy or flaccidity.   Lymphadenopathy:      Head:      Right side of head: No submental or submandibular adenopathy.      Left side of head: No submental or submandibular adenopathy.      Cervical: No cervical adenopathy.      Right cervical: No superficial cervical adenopathy.     Left cervical: No superficial cervical adenopathy.   Skin:     General: Skin is warm and dry.      Capillary Refill: Capillary refill takes less than 2 seconds.      Coloration: Skin is not jaundiced or pale.      Findings: No erythema.       Nails: There is no clubbing.   Neurological:      Mental Status: She is alert and oriented to person, place, and time.      Cranial Nerves: No cranial nerve deficit or facial asymmetry.      Sensory: No sensory deficit.      Motor: No weakness, tremor, atrophy or abnormal muscle tone.      Coordination: Coordination normal.      Gait: Gait abnormal (mild antalgia).      Deep Tendon Reflexes: Reflexes are normal and symmetric.   Psychiatric:         Attention and Perception: She is attentive.         Mood and Affect: Mood is anxious and depressed.         Speech: Speech normal.         Behavior: Behavior normal. Behavior is cooperative.         Thought Content: Thought content normal.         Cognition and Memory: Cognition normal.         Judgment: Judgment normal.      Comments: Multiple restless leg movements         Diagnoses and all orders for this visit:    1. Thyroiditis (Primary)  Comments:  continue Synthroid 50 mcg.  Labs today for thyroid evaluation  Orders:  -     CBC & Differential  -     Comprehensive Metabolic Panel  -     Vitamin D,25-Hydroxy  -     Vitamin B12  -     T4, Free  -     TSH    2. Chronic fatigue  Assessment & Plan:  Continue to be active  Labs today to rule out thyroid changes and vit changes    Orders:  -     CBC & Differential  -     Comprehensive Metabolic Panel  -     Vitamin D,25-Hydroxy  -     Vitamin B12  -     T4, Free  -     TSH    3. Dysuria  Comments:  POC urine today.  will send to culture if warranted  Orders:  -     POC Urinalysis Dipstick, Automated  -     Urine Culture - Urine, Urine, Clean Catch; Future    4. Abnormal glucose  Comments:  continue Ozempic  Orders:  -     Hemoglobin A1c    5. Anxiety disorder due to general medical condition with panic attack  Comments:  Continue Ativan as directed.  Continue Wellbutrin 300 mg.  Continue to work on stress reduction  Assessment & Plan:  Continue PRN Ativan and Wellbutrin 300 mg daily  Stress reduction advised (examples:   make time for self, Reading, Listening to music, Exercise, keeping a journal, venting to a confidant, etc.)        Orders:  -     LORazepam (ATIVAN) 1 MG tablet; Take 1 tablet by mouth Daily As Needed for Anxiety.  Dispense: 30 tablet; Refill: 0       Understands disease processes and need for medications.  Understands reasons for urgent and emergent care.  Patient (& family) verbalized agreement for treatment plan.   Emotional support and active listening provided.  Patient provided time to verbalize feelings.    EILEEN/PMDP reviewed today and consistent.  Will refill prescribed controlled medication today.  Patient is aware they cannot receive narcotics from any other provider except if under care of pain management or speciality clinic.  Risk and benefits of medication use has been reviewed.  History and physical exam exhibit continued safe and appropriate use of controlled substances.  The patient is aware of the potential for addiction and dependence.  This patient has been made aware of the appropriate use of such medications, including potential risk of somnolence, limited ability to drive and / or work safely, and potential for overdose.    It has also been made clear that these medications are for use by this patient only, without concomitant use of alcohol or other substances unless prescribed/advised by medical provider.  Patient understands they may be subject to UDS and pill counts at random.    Patient considered to be low risk for addiction due to use of single controlled medications.  Patient understands and accepts these risks.  Patient need for medication will be reassessed at each visit.  Doses will be adjusted according to patient need and findings.    Goal of TX: Patient will not have any adverse reactions of medication.  Patient have reduction in anxiety symptoms with use of PRN Ativan as directed.  Patient will be able to remain active in an outside of home with use of Ativan for anxiety  symptoms    Medication Dispense Information    Lorazepam   Dispensed: 2023 12:00 AM   Written: 2023   Unit strength: 1MG   Days supply: 30   Quantity: 30 each   Refills remainin   Pharmacy: [facility]   Authorizing provider: SORAIDA CRYSTAL   Received from: [facility] PDMP (Fill History)   Brand or Generic: Generic     Labs today. Will notify patient of results    RTC 3 months, sooner if needed.             This document has been electronically signed by:  ILEANA Vega FNP-C Dragon disclaimer:  Part of this note may be an electronic transcription/translation of spoken language to printed text using the Dragon Dictation System.

## 2023-08-09 LAB
25(OH)D3 SERPL-MCNC: 38.2 NG/ML (ref 30–100)
ALBUMIN SERPL-MCNC: 4.6 G/DL (ref 3.5–5.2)
ALBUMIN/GLOB SERPL: 1.6 G/DL
ALP SERPL-CCNC: 66 U/L (ref 39–117)
ALT SERPL W P-5'-P-CCNC: 15 U/L (ref 1–33)
ANION GAP SERPL CALCULATED.3IONS-SCNC: 11.4 MMOL/L (ref 5–15)
AST SERPL-CCNC: 16 U/L (ref 1–32)
BASOPHILS # BLD AUTO: 0.04 10*3/MM3 (ref 0–0.2)
BASOPHILS NFR BLD AUTO: 0.7 % (ref 0–1.5)
BILIRUB SERPL-MCNC: 0.3 MG/DL (ref 0–1.2)
BUN SERPL-MCNC: 9 MG/DL (ref 6–20)
BUN/CREAT SERPL: 9.3 (ref 7–25)
CALCIUM SPEC-SCNC: 10.2 MG/DL (ref 8.6–10.5)
CHLORIDE SERPL-SCNC: 102 MMOL/L (ref 98–107)
CO2 SERPL-SCNC: 25.6 MMOL/L (ref 22–29)
CREAT SERPL-MCNC: 0.97 MG/DL (ref 0.57–1)
DEPRECATED RDW RBC AUTO: 39.2 FL (ref 37–54)
EGFRCR SERPLBLD CKD-EPI 2021: 71.3 ML/MIN/1.73
EOSINOPHIL # BLD AUTO: 0.07 10*3/MM3 (ref 0–0.4)
EOSINOPHIL NFR BLD AUTO: 1.2 % (ref 0.3–6.2)
ERYTHROCYTE [DISTWIDTH] IN BLOOD BY AUTOMATED COUNT: 12.3 % (ref 12.3–15.4)
GLOBULIN UR ELPH-MCNC: 2.8 GM/DL
GLUCOSE SERPL-MCNC: 83 MG/DL (ref 65–99)
HBA1C MFR BLD: 5.5 % (ref 4.8–5.6)
HCT VFR BLD AUTO: 40.5 % (ref 34–46.6)
HGB BLD-MCNC: 13.9 G/DL (ref 12–15.9)
IMM GRANULOCYTES # BLD AUTO: 0.01 10*3/MM3 (ref 0–0.05)
IMM GRANULOCYTES NFR BLD AUTO: 0.2 % (ref 0–0.5)
LYMPHOCYTES # BLD AUTO: 2.22 10*3/MM3 (ref 0.7–3.1)
LYMPHOCYTES NFR BLD AUTO: 39 % (ref 19.6–45.3)
MCH RBC QN AUTO: 30.2 PG (ref 26.6–33)
MCHC RBC AUTO-ENTMCNC: 34.3 G/DL (ref 31.5–35.7)
MCV RBC AUTO: 87.9 FL (ref 79–97)
MONOCYTES # BLD AUTO: 0.37 10*3/MM3 (ref 0.1–0.9)
MONOCYTES NFR BLD AUTO: 6.5 % (ref 5–12)
NEUTROPHILS NFR BLD AUTO: 2.98 10*3/MM3 (ref 1.7–7)
NEUTROPHILS NFR BLD AUTO: 52.4 % (ref 42.7–76)
NRBC BLD AUTO-RTO: 0 /100 WBC (ref 0–0.2)
PLATELET # BLD AUTO: 257 10*3/MM3 (ref 140–450)
PMV BLD AUTO: 12.3 FL (ref 6–12)
POTASSIUM SERPL-SCNC: 4.3 MMOL/L (ref 3.5–5.2)
PROT SERPL-MCNC: 7.4 G/DL (ref 6–8.5)
RBC # BLD AUTO: 4.61 10*6/MM3 (ref 3.77–5.28)
SODIUM SERPL-SCNC: 139 MMOL/L (ref 136–145)
T4 FREE SERPL-MCNC: 1.28 NG/DL (ref 0.93–1.7)
TSH SERPL DL<=0.05 MIU/L-ACNC: 0.76 UIU/ML (ref 0.27–4.2)
VIT B12 BLD-MCNC: 416 PG/ML (ref 211–946)
WBC NRBC COR # BLD: 5.69 10*3/MM3 (ref 3.4–10.8)

## 2023-08-09 PROCEDURE — 87086 URINE CULTURE/COLONY COUNT: CPT | Performed by: NURSE PRACTITIONER

## 2023-08-10 LAB — BACTERIA SPEC AEROBE CULT: NO GROWTH

## 2023-08-10 NOTE — PROGRESS NOTES
Patient notified of labs via [employer].  Patient message is as follows:      Blood count is normal.  A1c is improving and it is at 5.5.  Vitamin D is good vitamin B12 is good your thyroid level is still within normal but has decreased from our previous check.  We will continue to monitor it.  Kidney and liver function are good.

## 2023-08-14 DIAGNOSIS — R73.09 ABNORMAL GLUCOSE: ICD-10-CM

## 2023-08-14 RX ORDER — SEMAGLUTIDE 1.34 MG/ML
INJECTION, SOLUTION SUBCUTANEOUS
Qty: 3 ML | Refills: 2 | Status: SHIPPED | OUTPATIENT
Start: 2023-08-14

## 2023-09-14 DIAGNOSIS — E78.1 PURE HYPERTRIGLYCERIDEMIA: ICD-10-CM

## 2023-09-14 RX ORDER — ROSUVASTATIN CALCIUM 20 MG/1
TABLET, COATED ORAL
Qty: 90 TABLET | Refills: 1 | Status: SHIPPED | OUTPATIENT
Start: 2023-09-14

## 2023-10-09 ENCOUNTER — OFFICE VISIT (OUTPATIENT)
Dept: FAMILY MEDICINE CLINIC | Facility: CLINIC | Age: 48
End: 2023-10-09
Payer: COMMERCIAL

## 2023-10-09 VITALS
BODY MASS INDEX: 25.98 KG/M2 | WEIGHT: 137.6 LBS | OXYGEN SATURATION: 99 % | HEIGHT: 61 IN | TEMPERATURE: 98.2 F | RESPIRATION RATE: 14 BRPM | DIASTOLIC BLOOD PRESSURE: 84 MMHG | HEART RATE: 68 BPM | SYSTOLIC BLOOD PRESSURE: 126 MMHG

## 2023-10-09 DIAGNOSIS — E06.9 THYROIDITIS: ICD-10-CM

## 2023-10-09 DIAGNOSIS — K59.04 CHRONIC IDIOPATHIC CONSTIPATION: ICD-10-CM

## 2023-10-09 DIAGNOSIS — E53.8 VITAMIN B 12 DEFICIENCY: ICD-10-CM

## 2023-10-09 DIAGNOSIS — Z12.11 COLON CANCER SCREENING: ICD-10-CM

## 2023-10-09 DIAGNOSIS — Z23 ENCOUNTER FOR IMMUNIZATION: ICD-10-CM

## 2023-10-09 DIAGNOSIS — F06.4 ANXIETY DISORDER DUE TO GENERAL MEDICAL CONDITION WITH PANIC ATTACK: ICD-10-CM

## 2023-10-09 DIAGNOSIS — R53.82 CHRONIC FATIGUE: ICD-10-CM

## 2023-10-09 DIAGNOSIS — R73.09 ABNORMAL GLUCOSE: ICD-10-CM

## 2023-10-09 DIAGNOSIS — E78.2 MIXED HYPERLIPIDEMIA: ICD-10-CM

## 2023-10-09 DIAGNOSIS — F41.0 ANXIETY DISORDER DUE TO GENERAL MEDICAL CONDITION WITH PANIC ATTACK: ICD-10-CM

## 2023-10-09 DIAGNOSIS — E78.1 PURE HYPERTRIGLYCERIDEMIA: ICD-10-CM

## 2023-10-09 DIAGNOSIS — E55.9 VITAMIN D DEFICIENCY DISEASE: ICD-10-CM

## 2023-10-09 DIAGNOSIS — R23.2 HOT FLASHES: ICD-10-CM

## 2023-10-09 DIAGNOSIS — I10 ESSENTIAL HYPERTENSION: Primary | ICD-10-CM

## 2023-10-09 LAB
25(OH)D3 SERPL-MCNC: 30.7 NG/ML (ref 30–100)
ALBUMIN SERPL-MCNC: 4.4 G/DL (ref 3.5–5.2)
ALBUMIN/GLOB SERPL: 1.5 G/DL
ALP SERPL-CCNC: 53 U/L (ref 39–117)
ALT SERPL W P-5'-P-CCNC: 11 U/L (ref 1–33)
ANION GAP SERPL CALCULATED.3IONS-SCNC: 11.4 MMOL/L (ref 5–15)
AST SERPL-CCNC: 11 U/L (ref 1–32)
BASOPHILS # BLD AUTO: 0.04 10*3/MM3 (ref 0–0.2)
BASOPHILS NFR BLD AUTO: 0.8 % (ref 0–1.5)
BILIRUB SERPL-MCNC: 0.3 MG/DL (ref 0–1.2)
BUN SERPL-MCNC: 8 MG/DL (ref 6–20)
BUN/CREAT SERPL: 8.1 (ref 7–25)
CALCIUM SPEC-SCNC: 9.5 MG/DL (ref 8.6–10.5)
CHLORIDE SERPL-SCNC: 103 MMOL/L (ref 98–107)
CHOLEST SERPL-MCNC: 219 MG/DL (ref 0–200)
CO2 SERPL-SCNC: 25.6 MMOL/L (ref 22–29)
CREAT SERPL-MCNC: 0.99 MG/DL (ref 0.57–1)
DEPRECATED RDW RBC AUTO: 38.4 FL (ref 37–54)
EGFRCR SERPLBLD CKD-EPI 2021: 69.6 ML/MIN/1.73
EOSINOPHIL # BLD AUTO: 0.03 10*3/MM3 (ref 0–0.4)
EOSINOPHIL NFR BLD AUTO: 0.6 % (ref 0.3–6.2)
ERYTHROCYTE [DISTWIDTH] IN BLOOD BY AUTOMATED COUNT: 12.2 % (ref 12.3–15.4)
ESTRADIOL SERPL HS-MCNC: 11.8 PG/ML
GLOBULIN UR ELPH-MCNC: 2.9 GM/DL
GLUCOSE SERPL-MCNC: 79 MG/DL (ref 65–99)
HBA1C MFR BLD: 5.2 % (ref 4.8–5.6)
HCT VFR BLD AUTO: 38 % (ref 34–46.6)
HDLC SERPL-MCNC: 46 MG/DL (ref 40–60)
HGB BLD-MCNC: 13.3 G/DL (ref 12–15.9)
IMM GRANULOCYTES # BLD AUTO: 0.01 10*3/MM3 (ref 0–0.05)
IMM GRANULOCYTES NFR BLD AUTO: 0.2 % (ref 0–0.5)
IRON 24H UR-MRATE: 70 MCG/DL (ref 37–145)
IRON SATN MFR SERPL: 21 % (ref 20–50)
LDLC SERPL CALC-MCNC: 152 MG/DL (ref 0–100)
LDLC/HDLC SERPL: 3.24 {RATIO}
LYMPHOCYTES # BLD AUTO: 1.47 10*3/MM3 (ref 0.7–3.1)
LYMPHOCYTES NFR BLD AUTO: 29.9 % (ref 19.6–45.3)
MCH RBC QN AUTO: 30.2 PG (ref 26.6–33)
MCHC RBC AUTO-ENTMCNC: 35 G/DL (ref 31.5–35.7)
MCV RBC AUTO: 86.2 FL (ref 79–97)
MONOCYTES # BLD AUTO: 0.29 10*3/MM3 (ref 0.1–0.9)
MONOCYTES NFR BLD AUTO: 5.9 % (ref 5–12)
NEUTROPHILS NFR BLD AUTO: 3.07 10*3/MM3 (ref 1.7–7)
NEUTROPHILS NFR BLD AUTO: 62.6 % (ref 42.7–76)
NRBC BLD AUTO-RTO: 0 /100 WBC (ref 0–0.2)
PLATELET # BLD AUTO: 266 10*3/MM3 (ref 140–450)
PMV BLD AUTO: 12 FL (ref 6–12)
POTASSIUM SERPL-SCNC: 3.9 MMOL/L (ref 3.5–5.2)
PROGEST SERPL-MCNC: 0.1 NG/ML
PROT SERPL-MCNC: 7.3 G/DL (ref 6–8.5)
RBC # BLD AUTO: 4.41 10*6/MM3 (ref 3.77–5.28)
SODIUM SERPL-SCNC: 140 MMOL/L (ref 136–145)
T4 FREE SERPL-MCNC: 1.13 NG/DL (ref 0.93–1.7)
TIBC SERPL-MCNC: 335 MCG/DL (ref 298–536)
TRANSFERRIN SERPL-MCNC: 225 MG/DL (ref 200–360)
TRIGL SERPL-MCNC: 119 MG/DL (ref 0–150)
TSH SERPL DL<=0.05 MIU/L-ACNC: 2.04 UIU/ML (ref 0.27–4.2)
VIT B12 BLD-MCNC: 372 PG/ML (ref 211–946)
VLDLC SERPL-MCNC: 21 MG/DL (ref 5–40)
WBC NRBC COR # BLD: 4.91 10*3/MM3 (ref 3.4–10.8)

## 2023-10-09 PROCEDURE — 82607 VITAMIN B-12: CPT | Performed by: NURSE PRACTITIONER

## 2023-10-09 PROCEDURE — 83036 HEMOGLOBIN GLYCOSYLATED A1C: CPT | Performed by: NURSE PRACTITIONER

## 2023-10-09 PROCEDURE — 84466 ASSAY OF TRANSFERRIN: CPT | Performed by: NURSE PRACTITIONER

## 2023-10-09 PROCEDURE — 80061 LIPID PANEL: CPT | Performed by: NURSE PRACTITIONER

## 2023-10-09 PROCEDURE — 82306 VITAMIN D 25 HYDROXY: CPT | Performed by: NURSE PRACTITIONER

## 2023-10-09 PROCEDURE — 82670 ASSAY OF TOTAL ESTRADIOL: CPT | Performed by: NURSE PRACTITIONER

## 2023-10-09 PROCEDURE — 83540 ASSAY OF IRON: CPT | Performed by: NURSE PRACTITIONER

## 2023-10-09 PROCEDURE — 3079F DIAST BP 80-89 MM HG: CPT | Performed by: NURSE PRACTITIONER

## 2023-10-09 PROCEDURE — 90471 IMMUNIZATION ADMIN: CPT | Performed by: NURSE PRACTITIONER

## 2023-10-09 PROCEDURE — 3074F SYST BP LT 130 MM HG: CPT | Performed by: NURSE PRACTITIONER

## 2023-10-09 PROCEDURE — 99214 OFFICE O/P EST MOD 30 MIN: CPT | Performed by: NURSE PRACTITIONER

## 2023-10-09 PROCEDURE — 1159F MED LIST DOCD IN RCRD: CPT | Performed by: NURSE PRACTITIONER

## 2023-10-09 PROCEDURE — 80050 GENERAL HEALTH PANEL: CPT | Performed by: NURSE PRACTITIONER

## 2023-10-09 PROCEDURE — 1160F RVW MEDS BY RX/DR IN RCRD: CPT | Performed by: NURSE PRACTITIONER

## 2023-10-09 PROCEDURE — 90686 IIV4 VACC NO PRSV 0.5 ML IM: CPT | Performed by: NURSE PRACTITIONER

## 2023-10-09 PROCEDURE — 84439 ASSAY OF FREE THYROXINE: CPT | Performed by: NURSE PRACTITIONER

## 2023-10-09 PROCEDURE — 84403 ASSAY OF TOTAL TESTOSTERONE: CPT | Performed by: NURSE PRACTITIONER

## 2023-10-09 PROCEDURE — 84402 ASSAY OF FREE TESTOSTERONE: CPT | Performed by: NURSE PRACTITIONER

## 2023-10-09 PROCEDURE — 84144 ASSAY OF PROGESTERONE: CPT | Performed by: NURSE PRACTITIONER

## 2023-10-09 RX ORDER — BUPROPION HYDROCHLORIDE 300 MG/1
300 TABLET ORAL EVERY MORNING
Qty: 90 TABLET | Refills: 1 | Status: SHIPPED | OUTPATIENT
Start: 2023-10-09

## 2023-10-09 RX ORDER — LORAZEPAM 1 MG/1
1 TABLET ORAL DAILY PRN
Qty: 30 TABLET | Refills: 0 | Status: SHIPPED | OUTPATIENT
Start: 2023-10-09

## 2023-10-09 RX ORDER — CYANOCOBALAMIN 1000 UG/ML
1000 INJECTION, SOLUTION INTRAMUSCULAR; SUBCUTANEOUS WEEKLY
Qty: 4 ML | Refills: 5 | Status: SHIPPED | OUTPATIENT
Start: 2023-10-09

## 2023-10-09 RX ORDER — ERGOCALCIFEROL 1.25 MG/1
50000 CAPSULE ORAL WEEKLY
Qty: 12 CAPSULE | Refills: 2 | Status: SHIPPED | OUTPATIENT
Start: 2023-10-09

## 2023-10-09 NOTE — PROGRESS NOTES
"Subjective   Ekaterina Nelson is a 48 y.o. female.     Chief Complaint   Patient presents with    Weight Loss       History of Present Illness     Weight loss Observation-ongoing.  She has noted 7# loss since her last visit.    Hyperinsulinemia-on ozempic.  She is tolerating well.  No negative side effects.    Hypothyroidism-ongoing.  On synthroid 50 mcg.  No negative side effects.   She has noted some thinning of her hair and ridges on her nails.    Depression with Anxiety-on Wellbutrin 300 mg.  She does feel she has been more gambino.   She reports some increased episodes of depression and sadness.  She is also on Ativan 1 mg daily.    GERD-ongoing.  On Protonix 40 mg.  No negative side effects.   She reports that she had a recent GI illness.  She is concerned she got diarrhea.  She reports that she had belching.  She reports that she was having episodes of diarrhea daily.  She reports that she had Intense abdominal cramps.  She reports that she is currently doing better.  She reports that her bowel pattern has been more consistent since she is over the illness.   Hair thinning-would be interested in a steroid shampoo.  She reports that it has been for a couple of months.   She does feel that she is eating well.  She does feel she has less energy and some fatigue.  She is taking her thyroid meds as directed.     Hormone concern-She has noted some decreased libido.   She is night sweating often.  No cycle in about 2 years.  She has had an endometrial ablation.    HTN-checking periodically at home.  She is ordered Lisinopril 20 mg.  She has been not taking for \"couple of months\".  She was noting low BP.  No associated symptoms such as CP, SOA.    The following portions of the patient's history were reviewed and updated as appropriate: CC, ROS, allergies, current medications, past family history, past medical history, past social history, past surgical history and problem list.    Review of Systems   Constitutional:  " "Positive for fatigue. Negative for appetite change and fever.   HENT:  Negative for congestion, ear pain, nosebleeds, postnasal drip, rhinorrhea, sore throat, tinnitus, trouble swallowing and voice change.    Eyes:  Negative for blurred vision, photophobia and visual disturbance.   Respiratory:  Negative for cough, chest tightness, shortness of breath and wheezing.    Cardiovascular:  Negative for chest pain and palpitations.   Gastrointestinal:  Negative for abdominal pain, blood in stool, constipation, diarrhea, nausea and GERD.   Endocrine: Negative for cold intolerance, heat intolerance and polydipsia.        Thinning hair   Genitourinary:  Negative for decreased urine volume, difficulty urinating, dysuria and hematuria.   Musculoskeletal:  Negative for arthralgias, back pain, gait problem and myalgias.   Skin:  Negative for color change, pallor and wound.   Allergic/Immunologic: Negative.    Neurological:  Positive for dizziness and headache. Negative for syncope and numbness.   Hematological: Negative.    Psychiatric/Behavioral:  Positive for depressed mood and stress. Negative for decreased concentration, sleep disturbance and suicidal ideas. The patient is nervous/anxious.    All other systems reviewed and are negative.      Objective     /84   Pulse 68   Temp 98.2 øF (36.8 øC)   Resp 14   Ht 154.9 cm (60.98\")   Wt 62.4 kg (137 lb 9.6 oz)   SpO2 99%   BMI 26.01 kg/mý     Physical Exam  Vitals reviewed.   Constitutional:       General: She is not in acute distress.     Appearance: She is well-developed. She is not diaphoretic.   HENT:      Head: Normocephalic and atraumatic.      Jaw: No tenderness.      Right Ear: Hearing, tympanic membrane, ear canal and external ear normal.      Left Ear: Hearing, tympanic membrane, ear canal and external ear normal.      Nose: Nose normal. No nasal tenderness or congestion.      Right Sinus: No maxillary sinus tenderness or frontal sinus tenderness.      Left " Sinus: No maxillary sinus tenderness or frontal sinus tenderness.      Mouth/Throat:      Lips: Pink.      Mouth: Mucous membranes are moist.      Pharynx: Oropharynx is clear. Uvula midline.   Eyes:      General: Lids are normal. No scleral icterus.     Extraocular Movements:      Right eye: Normal extraocular motion and no nystagmus.      Left eye: Normal extraocular motion and no nystagmus.      Conjunctiva/sclera: Conjunctivae normal.      Pupils: Pupils are equal, round, and reactive to light.   Neck:      Thyroid: No thyromegaly or thyroid tenderness.      Vascular: No carotid bruit or JVD.      Trachea: No tracheal tenderness.   Cardiovascular:      Rate and Rhythm: Normal rate and regular rhythm.      Pulses:           Dorsalis pedis pulses are 2+ on the right side and 2+ on the left side.        Posterior tibial pulses are 2+ on the right side and 2+ on the left side.      Heart sounds: Normal heart sounds, S1 normal and S2 normal. No murmur heard.  Pulmonary:      Effort: Pulmonary effort is normal. No accessory muscle usage, prolonged expiration or respiratory distress.      Breath sounds: Normal breath sounds.   Chest:      Chest wall: No tenderness.   Abdominal:      General: Bowel sounds are normal. There is no distension.      Palpations: Abdomen is soft. There is no hepatomegaly, splenomegaly or mass.      Tenderness: There is no abdominal tenderness.   Musculoskeletal:         General: Tenderness (multiple areas of myalgia) present.      Cervical back: Normal range of motion and neck supple.      Right lower leg: No edema.      Left lower leg: No edema.      Comments: No muscular atrophy or flaccidity.   Lymphadenopathy:      Head:      Right side of head: No submental or submandibular adenopathy.      Left side of head: No submental or submandibular adenopathy.      Cervical: No cervical adenopathy.      Right cervical: No superficial cervical adenopathy.     Left cervical: No superficial cervical  adenopathy.   Skin:     General: Skin is warm and dry.      Capillary Refill: Capillary refill takes less than 2 seconds.      Coloration: Skin is not jaundiced or pale.      Findings: No erythema.      Nails: There is no clubbing.   Neurological:      Mental Status: She is alert and oriented to person, place, and time.      Cranial Nerves: No cranial nerve deficit or facial asymmetry.      Sensory: No sensory deficit.      Motor: No weakness, tremor, atrophy or abnormal muscle tone.      Coordination: Coordination normal.      Gait: Gait abnormal (mild antalgia).      Deep Tendon Reflexes: Reflexes are normal and symmetric.   Psychiatric:         Attention and Perception: She is attentive.         Mood and Affect: Mood is anxious and depressed.         Speech: Speech normal.         Behavior: Behavior normal. Behavior is cooperative.         Thought Content: Thought content normal.         Cognition and Memory: Cognition normal.         Judgment: Judgment normal.      Comments: Multiple restless leg movements       Diagnoses and all orders for this visit:    1. Essential hypertension (Primary)  Assessment & Plan:  Continue lisinopril 20 mg. Report any negative side effects.  Ambulatory BP monitoring either at home or random community checks.  Patient to report continued elevations >140/90.  Patient may come by office for checks if needed.       Orders:  -     CBC & Differential  -     Comprehensive Metabolic Panel    2. Abnormal glucose  -     Hemoglobin A1c    3. Pure hypertriglyceridemia  -     Lipid Panel    4. Thyroiditis  -     TSH  -     T4, Free    5. Chronic fatigue  -     Iron Profile    6. Vitamin B 12 deficiency  -     Vitamin B12  -     cyanocobalamin 1000 MCG/ML injection; Inject 1 mL into the appropriate muscle as directed by prescriber 1 (One) Time Per Week.  Dispense: 4 mL; Refill: 5    7. Mixed hyperlipidemia  Assessment & Plan:  Continue Crestor 20 mg   Continue to pursue a low cholesterol  diet    Orders:  -     Lipid Panel    8. Vitamin D deficiency disease  -     Vitamin D,25-Hydroxy  -     vitamin D (ERGOCALCIFEROL) 1.25 MG (40171 UT) capsule capsule; Take 1 capsule by mouth 1 (One) Time Per Week. Prior to [facility] Admission, Patient was on: Pt takes Wednesdays  Dispense: 12 capsule; Refill: 2    9. Hot flashes  -     Progesterone  -     Estradiol  -     Testosterone, Free, Total    10. Anxiety disorder due to general medical condition with panic attack  Comments:  Continue Ativan as directed.  Continue Wellbutrin 300 mg.  Continue to work on stress reduction  Assessment & Plan:  Continue PRN Ativan and Wellbutrin 300 mg daily  Stress reduction advised (examples:  make time for self, Reading, Listening to music, Exercise, keeping a journal, venting to a confidant, etc.)        Orders:  -     LORazepam (ATIVAN) 1 MG tablet; Take 1 tablet by mouth Daily As Needed for Anxiety.  Dispense: 30 tablet; Refill: 0  -     buPROPion XL (WELLBUTRIN XL) 300 MG 24 hr tablet; Take 1 tablet by mouth Every Morning.  Dispense: 90 tablet; Refill: 1    11. Chronic idiopathic constipation  Comments:  trial of Linzess.  continue to push fluids.    Orders:  -     linaclotide (Linzess) 72 MCG capsule capsule; Take 1 capsule by mouth Every Morning Before Breakfast.  Dispense: 30 capsule; Refill: 5    12. Encounter for immunization  -     Fluzone (or Fluarix & Flulaval for VFC) >6mos    13. Colon cancer screening  -     Cologuard - Stool, Per Rectum; Future        Understands disease processes and need for medications.  Understands reasons for urgent and emergent care.  Patient (& family) verbalized agreement for treatment plan.   Emotional support and active listening provided.  Patient provided time to verbalize feelings.    Will plan for updated labs.     EILEEN/PMDP reviewed today and consistent.  Will refill prescribed controlled medication today.  Patient is aware they cannot receive narcotics from any other provider  except if under care of pain management or speciality clinic.  Risk and benefits of medication use has been reviewed.  History and physical exam exhibit continued safe and appropriate use of controlled substances.  The patient is aware of the potential for addiction and dependence.  This patient has been made aware of the appropriate use of such medications, including potential risk of somnolence, limited ability to drive and / or work safely, and potential for overdose.    It has also been made clear that these medications are for use by this patient only, without concomitant use of alcohol or other substances unless prescribed/advised by medical provider.  Patient understands they may be subject to UDS and pill counts at random.    Patient considered to be low risk for addiction due to use of single controlled medications.  Patient understands and accepts these risks.  Patient need for medication will be reassessed at each visit.  Doses will be adjusted according to patient need and findings.    Goal of TX: Patient will not have any adverse reactions of medication.  Patient have reduction in anxiety symptoms with use of PRN Ativan as directed.  Patient will be able to remain active in an outside of home with use of Ativan for anxiety symptoms    [facility] Patient Controlled Substance Report (from 10/9/2022 to 10/9/2023)    Dispensed  Strength Quantity Days Supply Provider Pharmacy   09/01/2023 Lorazepam 1MG 30 each [facility]   07/06/2023 Lorazepam 1MG 30 each 30 [facility]        RTC 2 months, sooner if needed.           This document has been electronically signed by:  ILEANA Vega FNP-C Dragon disclaimer:  Part of this note may be an electronic transcription/translation of spoken language to printed text using the Dragon Dictation System.

## 2023-10-11 RX ORDER — CLOBETASOL PROPIONATE 0.05 G/100ML
SHAMPOO TOPICAL 2 TIMES WEEKLY
Qty: 118 ML | Refills: 5 | Status: SHIPPED | OUTPATIENT
Start: 2023-10-12

## 2023-10-14 LAB
TESTOST FREE SERPL-MCNC: 0.3 PG/ML (ref 0–4.2)
TESTOST SERPL-MCNC: 7 NG/DL (ref 4–50)

## 2023-10-17 RX ORDER — ESTRADIOL 0.07 MG/D
1 FILM, EXTENDED RELEASE TRANSDERMAL 2 TIMES WEEKLY
Qty: 8 EACH | Refills: 1 | Status: SHIPPED | OUTPATIENT
Start: 2023-10-19

## 2023-10-19 DIAGNOSIS — E06.9 THYROIDITIS: ICD-10-CM

## 2023-10-19 RX ORDER — LEVOTHYROXINE SODIUM 0.05 MG/1
50 TABLET ORAL DAILY
Qty: 30 TABLET | Refills: 5 | Status: SHIPPED | OUTPATIENT
Start: 2023-10-19

## 2023-10-26 DIAGNOSIS — R73.09 ABNORMAL GLUCOSE: ICD-10-CM

## 2023-10-27 RX ORDER — SEMAGLUTIDE 1.34 MG/ML
1 INJECTION, SOLUTION SUBCUTANEOUS WEEKLY
Qty: 3 ML | Refills: 2 | Status: SHIPPED | OUTPATIENT
Start: 2023-10-27

## 2023-11-20 DIAGNOSIS — R73.09 ABNORMAL GLUCOSE: ICD-10-CM

## 2023-11-20 RX ORDER — SEMAGLUTIDE 1.34 MG/ML
INJECTION, SOLUTION SUBCUTANEOUS
Qty: 3 ML | Refills: 2 | Status: SHIPPED | OUTPATIENT
Start: 2023-11-20

## 2023-11-22 RX ORDER — FLUCONAZOLE 150 MG/1
150 TABLET ORAL DAILY
Qty: 3 TABLET | Refills: 0 | Status: SHIPPED | OUTPATIENT
Start: 2023-11-22 | End: 2023-11-25

## 2023-12-04 RX ORDER — ESTRADIOL 0.07 MG/D
FILM, EXTENDED RELEASE TRANSDERMAL
Qty: 8 PATCH | Refills: 1 | Status: SHIPPED | OUTPATIENT
Start: 2023-12-04 | End: 2023-12-11

## 2023-12-11 ENCOUNTER — PROCEDURE VISIT (OUTPATIENT)
Dept: FAMILY MEDICINE CLINIC | Facility: CLINIC | Age: 48
End: 2023-12-11
Payer: COMMERCIAL

## 2023-12-11 VITALS
SYSTOLIC BLOOD PRESSURE: 124 MMHG | WEIGHT: 136 LBS | HEIGHT: 61 IN | RESPIRATION RATE: 14 BRPM | TEMPERATURE: 98.2 F | HEART RATE: 74 BPM | OXYGEN SATURATION: 98 % | BODY MASS INDEX: 25.68 KG/M2 | DIASTOLIC BLOOD PRESSURE: 80 MMHG

## 2023-12-11 DIAGNOSIS — N39.3 STRESS INCONTINENCE DUE TO PELVIC ORGAN PROLAPSE: ICD-10-CM

## 2023-12-11 DIAGNOSIS — F41.0 ANXIETY DISORDER DUE TO GENERAL MEDICAL CONDITION WITH PANIC ATTACK: ICD-10-CM

## 2023-12-11 DIAGNOSIS — R23.2 HOT FLASHES: ICD-10-CM

## 2023-12-11 DIAGNOSIS — Z00.00 VISIT FOR ANNUAL HEALTH EXAMINATION: Primary | ICD-10-CM

## 2023-12-11 DIAGNOSIS — F06.4 ANXIETY DISORDER DUE TO GENERAL MEDICAL CONDITION WITH PANIC ATTACK: ICD-10-CM

## 2023-12-11 DIAGNOSIS — L68.0 HIRSUTISM: ICD-10-CM

## 2023-12-11 DIAGNOSIS — Z01.419 ENCOUNTER FOR GYNECOLOGICAL EXAMINATION WITH PAPANICOLAOU SMEAR OF CERVIX: ICD-10-CM

## 2023-12-11 RX ORDER — ESTRADIOL 0.1 MG/D
1 FILM, EXTENDED RELEASE TRANSDERMAL 2 TIMES WEEKLY
Qty: 8 EACH | Refills: 2 | Status: SHIPPED | OUTPATIENT
Start: 2023-12-11

## 2023-12-11 RX ORDER — EFLORNITHINE HYDROCHLORIDE 139 MG/G
1 CREAM TOPICAL 2 TIMES DAILY WITH MEALS
Qty: 45 G | Refills: 2 | Status: SHIPPED | OUTPATIENT
Start: 2023-12-11 | End: 2024-01-10

## 2023-12-11 RX ORDER — SPIRONOLACTONE 25 MG/1
25 TABLET ORAL DAILY
Qty: 30 TABLET | Refills: 1 | Status: SHIPPED | OUTPATIENT
Start: 2023-12-11

## 2023-12-11 RX ORDER — LORAZEPAM 1 MG/1
1 TABLET ORAL DAILY PRN
Qty: 30 TABLET | Refills: 0 | Status: SHIPPED | OUTPATIENT
Start: 2023-12-11

## 2023-12-11 NOTE — PATIENT INSTRUCTIONS
It is important for your health to eat a healthy balanced diet, to exercise as tolerated, obtain dental and vision checkups routinely, work on stress reduction and be active about taking care of your mental health.  Routine age related immunizations(pneumonia, flu, shingles if applicable) are recommended.  Be active in obtaining age and gender routine maintenance exams (such as paps, breast exams, colonscopy, prostate exams, etc).    You are encouraged to have safe sex practices for STD prevention.    Be alert/educated on gun and water safety, seek help for any domestic violence concerns, and seatbelt use is strongly encouraged.       Advance Directive    Advance directives are legal documents that allow you to make decisions about your health care and medical treatment in case you become unable to communicate for yourself. Advance directives let your wishes be known to family, friends, and health care providers.  Discussing and writing advance directives should happen over time rather than all at once. Advance directives can be changed and updated at any time. There are different types of advance directives, such as:  Medical power of .  Living will.  Do not resuscitate (DNR) order or do not attempt resuscitation (DNAR) order.  Health care proxy and medical power of   A health care proxy is also called a health care agent. This person is appointed to make medical decisions for you when you are unable to make decisions for yourself. Generally, people ask a trusted friend or family member to act as their proxy and represent their preferences. Make sure you have an agreement with your trusted person to act as your proxy. A proxy may have to make a medical decision on your behalf if your wishes are not known.  A medical power of , also called a durable power of  for health care, is a legal document that names your health care proxy. Depending on the laws in your state, the document may  need to be:  Signed.  Notarized.  Dated.  Copied.  Witnessed.  Incorporated into your medical record.  You may also want to appoint a trusted person to manage your money in the event you are unable to do so. This is called a durable power of  for finances. It is a separate legal document from the durable power of  for health care. You may choose your health care proxy or someone different to act as your agent in money matters.  If you do not appoint a proxy, or there is a concern that the proxy is not acting in your best interest, a court may appoint a guardian to act on your behalf.  Living will  A living will is a set of instructions that state your wishes about medical care when you cannot express them yourself. Health care providers should keep a copy of your living will in your medical record. You may want to give a copy to family members or friends. To alert caregivers in case of an emergency, you can place a card in your wallet to let them know that you have a living will and where they can find it. A living will is used if you become:  Terminally ill.  Disabled.  Unable to communicate or make decisions.  The following decisions should be included in your living will:  To use or not to use life support equipment, such as dialysis machines and breathing machines (ventilators).  Whether you want a DNR or DNAR order. This tells health care providers not to use cardiopulmonary resuscitation (CPR) if breathing or heartbeat stops.  To use or not to use tube feeding.  To be given or not to be given food and fluids.  Whether you want comfort (palliative) care when the goal becomes comfort rather than a cure.  Whether you want to donate your organs and tissues.  A living will does not give instructions for distributing your money and property if you should pass away.  DNR or DNAR  A DNR or DNAR order is a request not to have CPR in the event that your heart stops beating or you stop breathing. If a DNR  or DNAR order has not been made and shared, a health care provider will try to help any patient whose heart has stopped or who has stopped breathing. If you plan to have surgery, talk with your health care provider about how your DNR or DNAR order will be followed if problems occur.  What if I do not have an advance directive?  Some states assign family decision makers to act on your behalf if you do not have an advance directive. Each state has its own laws about advance directives. You may want to check with your health care provider, , or state representative about the laws in your state.  Summary  Advance directives are legal documents that allow you to make decisions about your health care and medical treatment in case you become unable to communicate for yourself.  The process of discussing and writing advance directives should happen over time. You can change and update advance directives at any time.  Advance directives may include a medical power of , a living will, and a DNR or DNAR order.  This information is not intended to replace advice given to you by your health care provider. Make sure you discuss any questions you have with your health care provider.  Document Revised: 09/21/2021 Document Reviewed: 09/21/2021  [employer] Patient Education © 2022 [employer]        Budget-Friendly Healthy Eating  There are many ways to save money at the grocery store and continue to eat healthy. You can be successful if you:  Plan meals according to your budget.  Make a grocery list and only purchase food according to your grocery list.  Prepare food yourself at home.  What are tips for following this plan?  Reading food labels  Compare food labels between brand name foods and the store brand. Often the nutritional value is the same, but the store brand is lower cost.  Look for products that do not have added sugar, fat, or salt (sodium). These often cost the same but are healthier for you. Products  "may be labeled as:  Sugar-free.  Nonfat.  Low-fat.  Sodium-free.  Low-sodium.  Look for lean ground beef labeled as at least 92% lean and 8% fat.  Shopping    Buy only the items on your grocery list and go only to the areas of the store that have the items on your list.  Use coupons only for foods and brands you normally buy. Avoid buying items you wouldn't normally buy simply because they are on sale.  Check online and in newspapers for weekly deals.  Buy healthy items from the bulk bins when available, such as herbs, spices, flour, pasta, nuts, and dried fruit.  Buy fruits and vegetables that are in season. Prices are usually lower on in-season produce.  Look at the unit price on the price tag. Use it to compare different brands and sizes to find out which item is the best deal.  Choose healthy items that are often low-cost, such as carrots, potatoes, apples, bananas, and oranges. Dried or canned beans are a low-cost protein source.  Buy in bulk and freeze extra food. Items you can buy in bulk include meats, fish, poultry, frozen fruits, and frozen vegetables.  Avoid buying \"ready-to-eat\" foods, such as pre-cut fruits and vegetables and pre-made salads.  If possible, shop around to discover where you can find the best prices. Consider other retailers such as dollar stores, larger wholesale stores, local fruit and vegetable stands, and farmers markets.  Do not shop when you are hungry. If you shop while hungry, it may be hard to stick to your list and budget.  Resist impulse buying. Use your grocery list as your official plan for the week.  Buy a variety of vegetables and fruits by purchasing fresh, frozen, and canned items.  Look at the top and bottom shelves for deals. Foods at eye level (eye level of an adult or child) are usually more expensive.  Be efficient with your time when shopping. The more time you spend at the store, the more money you are likely to spend.  To save money when choosing more expensive " "foods like meats and dairy:  Choose cheaper cuts of meat, such as bone-in chicken thighs and drumsticks instead of skinless and boneless chicken. When you are ready to prepare the chicken, you can remove the skin yourself to make it healthier.  Choose lean meats like chicken or turkey instead of beef.  Choose canned seafood, such as tuna, salmon, or sardines.  Buy eggs as a low-cost source of protein.  Buy dried beans and peas, such as lentils, split peas, or kidney beans instead of meats. Dried beans and peas are a good alternative source of protein.  Buy the larger tubs of yogurt instead of individual-sized containers.  Choose water instead of sodas and other sweetened beverages.  Avoid buying chips, cookies, and other \"junk food.\" These items are usually expensive and not healthy.  Cooking  Make extra food and freeze the extras in meal-sized containers or in individual portions for fast meals and snacks.  Pre-cook on days when you have extra time to prepare meals in advance. You can keep these meals in the fridge or freezer and reheat for a quick meal.  When you come home from the grocery store, wash, peel, and cut fruits and vegetables so they are ready to use and eat. This will help reduce food waste.  Meal planning  Do not eat out or get fast food. Prepare food at home.  Make a grocery list and make sure to bring it with you to the store. If you have a smart phone, you could use your phone to create your shopping list.  Plan meals and snacks according to a grocery list and budget you create.  Use leftovers in your meal plan for the week.  Look for recipes where you can cook once and make enough food for two meals.  Prepare budget-friendly types of meals like stews, casseroles, and stir-ritchie dishes.  Try some meatless meals or try \"no cook\" meals like salads.  Make sure that half your plate is filled with fruits or vegetables. Choose from fresh, frozen, or canned fruits and vegetables. If eating canned, remember " "to rinse them before eating. This will remove any excess salt added for packaging.  Summary  Eating healthy on a budget is possible if you plan your meals according to your budget, purchase according to your budget and grocery list, and prepare food yourself.  Tips for buying more food on a limited budget include buying generic brands, using coupons only for foods you normally buy, and buying healthy items from the bulk bins when available.  Tips for buying cheaper food to replace expensive food include choosing cheaper, lean cuts of meat, and buying dried beans and peas.  This information is not intended to replace advice given to you by your health care provider. Make sure you discuss any questions you have with your health care provider.  Document Revised: 09/30/2021 Document Reviewed: 09/30/2021  [employer] Patient Education © 2022 [employer]        DASH Eating Plan  DASH stands for Dietary Approaches to Stop Hypertension. The DASH eating plan is a healthy eating plan that has been shown to:  Reduce high blood pressure (hypertension).  Reduce your risk for type 2 diabetes, heart disease, and stroke.  Help with weight loss.  What are tips for following this plan?  Reading food labels  Check food labels for the amount of salt (sodium) per serving. Choose foods with less than 5 percent of the Daily Value of sodium. Generally, foods with less than 300 milligrams (mg) of sodium per serving fit into this eating plan.  To find whole grains, look for the word \"whole\" as the first word in the ingredient list.  Shopping  Buy products labeled as \"low-sodium\" or \"no salt added.\"  Buy fresh foods. Avoid canned foods and pre-made or frozen meals.  Cooking  Avoid adding salt when cooking. Use salt-free seasonings or herbs instead of table salt or sea salt. Check with your health care provider or pharmacist before using salt substitutes.  Do not ritchie foods. Cook foods using healthy methods such as baking, boiling, grilling, " roasting, and broiling instead.  Cook with heart-healthy oils, such as olive, canola, avocado, soybean, or sunflower oil.  Meal planning    Eat a balanced diet that includes:  4 or more servings of fruits and 4 or more servings of vegetables each day. Try to fill one-half of your plate with fruits and vegetables.  6-8 servings of whole grains each day.  Less than 6 oz (170 g) of lean meat, poultry, or fish each day. A 3-oz (85-g) serving of meat is about the same size as a deck of cards. One egg equals 1 oz (28 g).  2-3 servings of low-fat dairy each day. One serving is 1 cup (237 mL).  1 serving of nuts, seeds, or beans 5 times each week.  2-3 servings of heart-healthy fats. Healthy fats called omega-3 fatty acids are found in foods such as walnuts, flaxseeds, fortified milks, and eggs. These fats are also found in cold-water fish, such as sardines, salmon, and mackerel.  Limit how much you eat of:  Canned or prepackaged foods.  Food that is high in trans fat, such as some fried foods.  Food that is high in saturated fat, such as fatty meat.  Desserts and other sweets, sugary drinks, and other foods with added sugar.  Full-fat dairy products.  Do not salt foods before eating.  Do not eat more than 4 egg yolks a week.  Try to eat at least 2 vegetarian meals a week.  Eat more home-cooked food and less restaurant, buffet, and fast food.  Lifestyle  When eating at a restaurant, ask that your food be prepared with less salt or no salt, if possible.  If you drink alcohol:  Limit how much you use to:  0-1 drink a day for women who are not pregnant.  0-2 drinks a day for men.  Be aware of how much alcohol is in your drink. In the [place] one drink equals one 12 oz bottle of beer (355 mL), one 5 oz glass of wine (148 mL), or one 1½ oz glass of hard liquor (44 mL).  General information  Avoid eating more than 2,300 mg of salt a day. If you have hypertension, you may need to reduce your sodium intake to 1,500 mg a day.  Work  with your health care provider to maintain a healthy body weight or to lose weight. Ask what an ideal weight is for you.  Get at least 30 minutes of exercise that causes your heart to beat faster (aerobic exercise) most days of the week. Activities may include walking, swimming, or biking.  Work with your health care provider or dietitian to adjust your eating plan to your individual calorie needs.  What foods should I eat?  Fruits  All fresh, dried, or frozen fruit. Canned fruit in natural juice (without added sugar).  Vegetables  Fresh or frozen vegetables (raw, steamed, roasted, or grilled). Low-sodium or reduced-sodium tomato and vegetable juice. Low-sodium or reduced-sodium tomato sauce and tomato paste. Low-sodium or reduced-sodium canned vegetables.  Grains  Whole-grain or whole-wheat bread. Whole-grain or whole-wheat pasta. Brown rice. Oatmeal. Quinoa. Bulgur. Whole-grain and low-sodium cereals. Tiffany bread. Low-fat, low-sodium crackers. Whole-wheat flour tortillas.  Meats and other proteins  Skinless chicken or turkey. Ground chicken or turkey. Pork with fat trimmed off. Fish and seafood. Egg whites. Dried beans, peas, or lentils. Unsalted nuts, nut butters, and seeds. Unsalted canned beans. Lean cuts of beef with fat trimmed off. Low-sodium, lean precooked or cured meat, such as sausages or meat loaves.  Dairy  Low-fat (1%) or fat-free (skim) milk. Reduced-fat, low-fat, or fat-free cheeses. Nonfat, low-sodium ricotta or cottage cheese. Low-fat or nonfat yogurt. Low-fat, low-sodium cheese.  Fats and oils  Soft margarine without trans fats. Vegetable oil. Reduced-fat, low-fat, or light mayonnaise and salad dressings (reduced-sodium). Canola, safflower, olive, avocado, soybean, and sunflower oils. Avocado.  Seasonings and condiments  Herbs. Spices. Seasoning mixes without salt.  Other foods  Unsalted popcorn and pretzels. Fat-free sweets.  The items listed above may not be a complete list of foods and  beverages you can eat. Contact a dietitian for more information.  What foods should I avoid?  Fruits  Canned fruit in a light or heavy syrup. Fried fruit. Fruit in cream or butter sauce.  Vegetables  Creamed or fried vegetables. Vegetables in a cheese sauce. Regular canned vegetables (not low-sodium or reduced-sodium). Regular canned tomato sauce and paste (not low-sodium or reduced-sodium). Regular tomato and vegetable juice (not low-sodium or reduced-sodium). Pickles. Olives.  Grains  Baked goods made with fat, such as croissants, muffins, or some breads. Dry pasta or rice meal packs.  Meats and other proteins  Fatty cuts of meat. Ribs. Fried meat. King. Bologna, salami, and other precooked or cured meats, such as sausages or meat loaves. Fat from the back of a pig (fatback). Bratwurst. Salted nuts and seeds. Canned beans with added salt. Canned or smoked fish. Whole eggs or egg yolks. Chicken or turkey with skin.  Dairy  Whole or 2% milk, cream, and half-and-half. Whole or full-fat cream cheese. Whole-fat or sweetened yogurt. Full-fat cheese. Nondairy creamers. Whipped toppings. Processed cheese and cheese spreads.  Fats and oils  Butter. Stick margarine. Lard. Shortening. Ghee. King fat. Tropical oils, such as coconut, palm kernel, or palm oil.  Seasonings and condiments  Onion salt, garlic salt, seasoned salt, table salt, and sea salt. [facility] sauce. Tartar sauce. Barbecue sauce. Teriyaki sauce. Soy sauce, including reduced-sodium. Steak sauce. Canned and packaged gravies. Fish sauce. Oyster sauce. Cocktail sauce. Store-bought horseradish. Ketchup. Mustard. Meat flavorings and tenderizers. Bouillon cubes. Hot sauces. Pre-made or packaged marinades. Pre-made or packaged taco seasonings. Relishes. Regular salad dressings.  Other foods  Salted popcorn and pretzels.  The items listed above may not be a complete list of foods and beverages you should avoid. Contact a dietitian for more information.  Where  to find more information  National Heart, Lung, and Blood [place]: www.nhlbi.nih.gov  American Heart Association: www.heart.org  Academy of Nutrition and Dietetics: www.eatright.org  National Kidney Foundation: www.kidney.org  Summary  The DASH eating plan is a healthy eating plan that has been shown to reduce high blood pressure (hypertension). It may also reduce your risk for type 2 diabetes, heart disease, and stroke.  When on the DASH eating plan, aim to eat more fresh fruits and vegetables, whole grains, lean proteins, low-fat dairy, and heart-healthy fats.  With the DASH eating plan, you should limit salt (sodium) intake to 2,300 mg a day. If you have hypertension, you may need to reduce your sodium intake to 1,500 mg a day.  Work with your health care provider or dietitian to adjust your eating plan to your individual calorie needs.  This information is not intended to replace advice given to you by your health care provider. Make sure you discuss any questions you have with your health care provider.  Document Revised: 11/20/2020 Document Reviewed: 11/20/2020     Fall Prevention in the Home, Adult  Falls can cause injuries and affect people of all ages. There are many simple things that you can do to make your home safe and to help prevent falls. Ask for help when making these changes, if needed.  What actions can I take to prevent falls?  General instructions  Use good lighting in all rooms. Replace any light bulbs that burn out, turn on lights if it is dark, and use night-lights.  Place frequently used items in easy-to-reach places. Lower the shelves around your home if necessary.  Set up furniture so that there are clear paths around it. Avoid moving your furniture around.  Remove throw rugs and other tripping hazards from the floor.  Avoid walking on wet floors.  Fix any uneven floor surfaces.  Add color or contrast paint or tape to grab bars and handrails in your home. Place contrasting color strips  on the first and last steps of staircases.  When you use a stepladder, make sure that it is completely opened and that the sides and supports are firmly locked. Have someone hold the ladder while you are using it. Do not climb a closed stepladder.  Know where your pets are when moving through your home.  What can I do in the bathroom?     Keep the floor dry. Immediately clean up any water that is on the floor.  Remove soap buildup in the tub or shower regularly.  Use nonskid mats or decals on the floor of the tub or shower.  Attach bath mats securely with double-sided, nonslip rug tape.  If you need to sit down while you are in the shower, use a plastic, nonslip stool.  Install grab bars by the toilet and in the tub and shower. Do not use towel bars as grab bars.  What can I do in the bedroom?  Make sure that a bedside light is easy to reach.  Do not use oversized bedding that reaches the floor.  Have a firm chair that has side arms to use for getting dressed.  What can I do in the kitchen?  Clean up any spills right away.  If you need to reach for something above you, use a sturdy step stool that has a grab bar.  Keep electrical cables out of the way.  Do not use floor polish or wax that makes floors slippery. If you must use wax, make sure that it is non-skid floor wax.  What can I do with my stairs?  Do not leave any items on the stairs.  Make sure that you have a light switch at the top and the bottom of the stairs. Have them installed if you do not have them.  Make sure that there are handrails on both sides of the stairs. Fix handrails that are broken or loose. Make sure that handrails are as long as the staircases.  Install non-slip stair treads on all stairs in your home.  Avoid having throw rugs at the top or bottom of stairs, or secure the rugs with carpet tape to prevent them from moving.  Choose a carpet design that does not hide the edge of steps on the stairs.  Check any carpeting to make sure that it  is firmly attached to the stairs. Fix any carpet that is loose or worn.  What can I do on the outside of my home?  Use bright outdoor lighting.  Regularly repair the edges of walkways and driveways and fix any cracks.  Remove high doorway thresholds.  Trim any shrubbery on the main path into your home.  Regularly check that handrails are securely fastened and in good repair. Both sides of all steps should have handrails.  Install guardrails along the edges of any raised decks or porches.  Clear walkways of debris and clutter, including tools and rocks.  Have leaves, snow, and ice cleared regularly.  Use sand or salt on walkways during winter months.  In the garage, clean up any spills right away, including grease or oil spills.  What other actions can I take?  Wear closed-toe shoes that fit well and support your feet. Wear shoes that have rubber soles or low heels.  Use mobility aids as needed, such as canes, walkers, scooters, and crutches.  Review your medicines with your health care provider. Some medicines can cause dizziness or changes in blood pressure, which increase your risk of falling.  Talk with your health care provider about other ways that you can decrease your risk of falls. This may include working with a physical therapist or  to improve your strength, balance, and endurance.  Where to find more information  Centers for Disease Control and PreventionCECE: www.cdc.gov  National [place] on Aging: www.alejandro.nih.gov  Contact a health care provider if:  You are afraid of falling at home.  You feel weak, drowsy, or dizzy at home.  You fall at home.  Summary  There are many simple things that you can do to make your home safe and to help prevent falls.  Ways to make your home safe include removing tripping hazards and installing grab bars in the bathroom.  Ask for help when making these changes in your home.  This information is not intended to replace advice given to you by your health care  provider. Make sure you discuss any questions you have with your health care provider.  Document Revised: 09/19/2022 Document Reviewed: 07/21/2021  [employer] Patient Education © 2022 [employer]     Health Risks of Smoking  Smoking tobacco is very bad for your health. Tobacco smoke contains many toxic chemicals that can damage every part of your body. Secondhand smoke can be harmful to those around you. Tobacco or nicotine use can cause many long-term (chronic) diseases.  Smoking is difficult to quit because a chemical in tobacco, called nicotine, causes addiction or dependence. When you smoke and inhale, nicotine is absorbed quickly into the bloodstream through your lungs. Both inhaled and non-inhaled nicotine may be addictive.  How can quitting affect me?  There are health benefits of quitting smoking. Some benefits happen right away and others take time. Benefits may include:  Blood flow, blood pressure, heart rate, and lung capacity may begin to improve. However, any lung damage that has already occurred cannot be repaired.  Temporary respiratory symptoms, such as nasal congestion and cough, may improve over time.  Your risk of heart disease, stroke, and cancer is reduced.  The overall quality of your health may improve.  You may save money, as you will not spend money on tobacco products and may spend less money on smoking-related health issues.  What can increase my risk?  Smoking harms nearly every organ in the body. People who smoke tobacco have a shorter life expectancy and an increased risk of many serious medical problems. These include:  More respiratory infections, such as colds and pneumonia.  Cancer.  Heart disease.  Stroke.  Chronic respiratory diseases.  Delayed wound healing and increased risk of complications during surgery.  Problems with reproduction, pregnancy, and childbirth, such as infertility, early (premature) births, stillbirths, and birth defects.  Secondhand smoke exposure to children  increases the risk of:  Sudden infant death syndrome (SIDS).  Infections in the nose, throat, or airways (respiratory infections).  Chronic respiratory symptoms.  What actions can I take to quit?  Smoking is an addiction that affects both your body and your mind, and long-time habits can be hard to change. Your health care provider can recommend:  Nicotine replacement products, such as patches, gum, and nasal sprays. Use these products only as directed. Do not replace cigarette smoking with electronic cigarettes, which are commonly called e-cigarettes. The safety of e-cigarettes is not known, and some may contain harmful chemicals.  Programs and community resources, which may include group support, education, or talk therapy.  Prescription medicines to help reduce cravings.  A combination of two or more quit methods, which will increase the success of quitting.  Where to find support  Follow the recommendations from your health care provider about support groups and other assistance. You can also visit:  [place] [employer] Quitline Consortium: www.Nomesialine.org or call 9-000-QUIT-NOW.  [place] Department of Health and Human Services: www.smokefree.gov  American Lung Association: www.freedomfromsmoking.org  American Heart Association: www.heart.org  Where to find more information  Centers for Disease Control and Prevention: www.cdc.gov  World Health Organization: www.who.int  Summary  Smoking tobacco is very bad for your health. Tobacco smoke contains many toxic chemicals that can damage every part of the body.  Smoking is difficult to quit because a chemical in tobacco, called nicotine, causes addiction or dependence.  There are immediate and long-term health benefits of quitting smoking.  A combination of two or more quit methods increases the success of quitting.  This information is not intended to replace advice given to you by your health care provider. Make sure you discuss any questions you have with your health  care provider.  Document Revised: 08/22/2022 Document Reviewed: 02/01/2021  Elsevier Patient Education © 2022 Elsevier Inc.     Stress, Adult  Stress is a normal reaction to life events. Stress is what you feel when life demands more than you are used to, or more than you think you can handle.  Some stress can be useful, such as studying for a test or meeting a deadline at work. Stress that occurs too often or for too long can cause problems. Long-lasting stress is called chronic stress. Chronic stress can affect your emotional health and interfere with relationships and normal daily activities.  Too much stress can weaken your body's defense system (immune system) and increase your risk for physical illness. If you already have a medical problem, stress can make it worse.  What are the causes?  All sorts of life events can cause stress. An event that causes stress for one person may not be stressful for someone else. Major life events, whether positive or negative, commonly cause stress. Examples include:  Losing a job or starting a new job.  Losing a loved one.  Moving to a new town or home.  Getting  or .  Having a baby.  Getting injured or sick.  Less obvious life events can also cause stress, especially if they occur day after day or in combination with each other. Examples include:  Working long hours.  Driving in traffic.  Caring for children.  Being in debt.  Being in a difficult relationship.  What are the signs or symptoms?  Stress can cause emotional and physical symptoms and can lead to unhealthy behaviors. These include the following:  Emotional symptoms  Anxiety. This is feeling worried, afraid, on edge, overwhelmed, or out of control.  Anger, including irritation or impatience.  Depression. This is feeling sad, down, helpless, or guilty.  Trouble focusing, remembering, or making decisions.  Physical symptoms  Aches and pains. These may affect your head, neck, back, stomach, or other  areas of your body.  Tight muscles or a clenched jaw.  Low energy.  Trouble sleeping.  Unhealthy behaviors  Eating to feel better (overeating) or skipping meals.  Working too much or putting off tasks.  Smoking, drinking alcohol, or using drugs to feel better.  How is this diagnosed?  A stress disorder is diagnosed through an assessment by your health care provider. A stress disorder may be diagnosed based on:  Your symptoms and any stressful life events.  Your medical history.  Tests to rule out other causes of your symptoms.  Depending on your condition, your health care provider may refer you to a specialist for further evaluation.  How is this treated?  Stress management techniques are the recommended treatment for stress. Medicine is not typically recommended for treating stress.  Techniques to reduce your reaction to stressful life events include:  Identifying stress. Monitor yourself for symptoms of stress and notice what causes stress for you. These skills may help you to avoid or prepare for stressful events.  Managing time. Set your priorities, keep a calendar of events, and learn to say no. These actions can help you avoid taking on too much.  Techniques for dealing with stress include:  Rethinking the problem. Try to think realistically about stressful events rather than ignoring them or overreacting. Try to find the positives in a stressful situation rather than focusing on the negatives.  Exercise. Physical exercise can release both physical and emotional tension. The key is to find a form of exercise that you enjoy and do it regularly.  Relaxation techniques. These relax the body and mind. Find one or more that you enjoy and use the techniques regularly. Examples include:  Meditation, deep breathing, or progressive relaxation techniques.  Yoga or carlyle chi.  Biofeedback, mindfulness techniques, or journaling.  Listening to music, being in nature, or taking part in other hobbies.  Practicing a healthy  lifestyle. Eat a balanced diet, drink plenty of water, limit or avoid caffeine, and get plenty of sleep.  Having a strong support network. Spend time with family, friends, or other people you enjoy being around. Express your feelings and talk things over with someone you trust.  Counseling or talk therapy with a mental health provider may help if you are having trouble managing stress by yourself.  Follow these instructions at home:  Lifestyle    Avoid drugs.  Do not use any products that contain nicotine or tobacco. These products include cigarettes, chewing tobacco, and vaping devices, such as e-cigarettes. If you need help quitting, ask your health care provider.  If you drink alcohol:  Limit how much you have to:  0-1 drink a day for women who are not pregnant.  0-2 drinks a day for men.  Know how much alcohol is in a drink. In the [place] one drink equals one 12 oz bottle of beer (355 mL), one 5 oz glass of wine (148 mL), or one 1½ oz glass of hard liquor (44 mL).  Do not use alcohol or drugs to relax.  Eat a balanced diet that includes fresh fruits and vegetables, whole grains, lean meats, fish, eggs, beans, and low-fat dairy. Avoid processed foods and foods high in added fat, sugar, and salt.  Exercise at least 30 minutes on 5 or more days each week.  Get 7-8 hours of sleep each night.  General instructions    Practice stress management techniques as told by your health care provider.  Drink enough fluid to keep your urine pale yellow.  Take over-the-counter and prescription medicines only as told by your health care provider.  Keep all follow-up visits. This is important.  Contact a health care provider if:  Your symptoms get worse.  You have new symptoms.  You feel overwhelmed by your problems and can no longer manage them by yourself.  Get help right away if:  You have thoughts of hurting yourself or others.  Get help right awayif you feel like you may hurt yourself or others, or have thoughts about taking  your own life. Go to your nearest emergency room or:  Call 911.  Call the National Suicide Prevention Lifeline at 1-724.982.3821 or 578. This is open 24 hours a day.  Text the Crisis Text Line at 248547.  Summary  Stress is a normal reaction to life events. It can cause problems if it happens too often or for too long.  Practicing stress management techniques is the best way to treat stress.  Counseling or talk therapy with a mental health provider may help if you are having trouble managing stress by yourself.  This information is not intended to replace advice given to you by your health care provider. Make sure you discuss any questions you have with your health care provider.  Document Revised: 07/28/2022 Document Reviewed: 07/28/2022  Elsevier Patient Education © 2022 Elsevier Inc.

## 2023-12-11 NOTE — PROGRESS NOTES
Teofilo Nelson is a 48 y.o. female.     Chief Complaint   Patient presents with    Physical Demand Screen (PDS)       History of Present Illness     Annual physical-patient is here for an updated annual physical.   She is due for updated pap.  She reports that she is noting some TUAN with squatting, jumping and even cough.   She reports that at times she cannot hold it long enough to make it to the restroom.    GERD-chronic and ongoing.  Patient is currently ordered Prilosec 40 mg.  No negative side effects.  No negative side effects of medication.  Patient does avoid foods that trigger reflux symptoms.  Denies any recent exacerbations.  Chronic constipation-ongoing patient is currently ordered Linzess 72 mcg and Colace.  No negative side effects.  Hypertension/CVD/history of MI-ongoing.  Patient is currently ordered aspirin 81 mg, Plavix 75 mg, Lasix 20 mg for edema, lisinopril 20 mg.  No negative side effects of medication.  Her blood pressure is controlled.  Hyperlipidemia-chronic and ongoing.  Patient is currently ordered rosuvastatin 20 mg.  No negative side effects.  Patient denies any negative side effects of cholesterol medication.  No reported myalgia or myopathies.  Depression and anxiety-ongoing.  Patient is currently ordered Wellbutrin 300 mg.  She is also ordered Ativan 1 mg daily as needed.  She continues to feel anxious and overwhelmed with crying at times.   She has in the past had a dog which helped her significantly with symptoms but had to surrender him due to her current living situation.  She would like to have a letter for an emotional support animal.  HRT-patient is currently ordered estradiol patch.  She was having significant problems with menopausal symptoms.  She has a history of endometrial ablation.  She is due for an updated Pap smear.  She reports that she can tell that she is reaching some stability.  She reports that she is tolerating.  She skin irritation for the patch.   She reports that the irritability seems to be less.   Thinning hair-ongoing.  Patient is currently ordered clobetasol shampoo but she has not used due to possible side effect of increased Hirsutism.  She has also been trying to increase her protein intake as she is actively working on weight reduction.  She has been on spironolactone in the past for hirsutism.    She would like to try the medication again.  She has noted some increase in thick dark hair.    Abnormal glucose-with a history of PCOS-patient is currently ordered Ozempic 1 mg weekly.  She is tolerating well.  No negative side effects at this time.  Right shoulder and neck spasm-reports that she thinks she has pulled a muscle.  She reports a sensation of pins and needles.  Has been present for about 2 weeks.  She reports when she looks upward she gets a sensation of a catch in her neck.     Review of Systems   Constitutional:  Positive for fatigue. Negative for appetite change and fever.   HENT:  Negative for congestion, ear pain, nosebleeds, postnasal drip, rhinorrhea, sore throat, tinnitus, trouble swallowing and voice change.    Eyes:  Negative for blurred vision, photophobia and visual disturbance.   Respiratory:  Negative for cough, chest tightness, shortness of breath and wheezing.    Cardiovascular:  Negative for chest pain and palpitations.   Gastrointestinal:  Negative for abdominal pain, blood in stool, constipation, diarrhea, nausea, vomiting and GERD.   Endocrine: Negative for cold intolerance, heat intolerance and polydipsia.        Night sweats   Genitourinary:  Positive for decreased libido and urinary incontinence. Negative for breast discharge, breast lump, breast pain, decreased urine volume, difficulty urinating, dysuria, hematuria and vaginal pain.   Musculoskeletal:  Positive for arthralgias. Negative for back pain, gait problem and myalgias.   Skin:  Negative for color change, pallor and wound.   Allergic/Immunologic: Negative.   "  Neurological:  Positive for numbness. Negative for dizziness, syncope and headache.   Hematological: Negative.    Psychiatric/Behavioral:  Positive for depressed mood and stress. Negative for decreased concentration, sleep disturbance and suicidal ideas. The patient is nervous/anxious.    All other systems reviewed and are negative.      Objective     /80   Pulse 74   Temp 98.2 °F (36.8 °C)   Resp 14   Ht 154.9 cm (60.98\")   Wt 61.7 kg (136 lb)   SpO2 98%   BMI 25.71 kg/m²     Physical Exam  Vitals reviewed. Exam conducted with a chaperone present.   Constitutional:       General: She is not in acute distress.     Appearance: She is well-developed. She is not diaphoretic.   HENT:      Head: Normocephalic and atraumatic.      Jaw: No tenderness.      Right Ear: Hearing, tympanic membrane, ear canal and external ear normal.      Left Ear: Hearing, tympanic membrane, ear canal and external ear normal.      Nose: Nose normal. No nasal tenderness or congestion.      Right Sinus: No maxillary sinus tenderness or frontal sinus tenderness.      Left Sinus: No maxillary sinus tenderness or frontal sinus tenderness.      Mouth/Throat:      Lips: Pink.      Mouth: Mucous membranes are moist.      Pharynx: Oropharynx is clear. Uvula midline.   Eyes:      General: Lids are normal. No scleral icterus.     Extraocular Movements:      Right eye: Normal extraocular motion and no nystagmus.      Left eye: Normal extraocular motion and no nystagmus.      Conjunctiva/sclera: Conjunctivae normal.      Pupils: Pupils are equal, round, and reactive to light.   Neck:      Thyroid: No thyromegaly or thyroid tenderness.      Vascular: No carotid bruit or JVD.      Trachea: No tracheal tenderness.   Cardiovascular:      Rate and Rhythm: Normal rate and regular rhythm.      Pulses:           Dorsalis pedis pulses are 2+ on the right side and 2+ on the left side.        Posterior tibial pulses are 2+ on the right side and 2+ on " the left side.      Heart sounds: Normal heart sounds, S1 normal and S2 normal. No murmur heard.  Pulmonary:      Effort: Pulmonary effort is normal. No accessory muscle usage, prolonged expiration or respiratory distress.      Breath sounds: Normal breath sounds.   Chest:      Chest wall: No tenderness.   Breasts:     Right: No swelling, bleeding, inverted nipple, mass, nipple discharge, skin change or tenderness.      Left: No swelling, bleeding, inverted nipple, mass, nipple discharge, skin change or tenderness.   Abdominal:      General: Bowel sounds are normal. There is no distension.      Palpations: Abdomen is soft. There is no hepatomegaly, splenomegaly or mass.      Tenderness: There is no abdominal tenderness.   Genitourinary:     Exam position: Supine.      Labia:         Right: No rash, tenderness, lesion or injury.         Left: No rash, tenderness, lesion or injury.       Urethra: No prolapse.      Vagina: No vaginal discharge or tenderness.      Cervix: Normal. No cervical motion tenderness, friability or erythema.      Uterus: Normal.       Adnexa:         Right: No mass, tenderness or fullness.          Left: No mass, tenderness or fullness.        Comments: Relaxation of bladder and uterus on exam.  Cystocele/rectocele present.  Musculoskeletal:         General: Tenderness present.      Cervical back: Normal range of motion and neck supple. Spinous process tenderness and muscular tenderness present.      Right lower leg: No edema.      Left lower leg: No edema.      Comments: No muscular atrophy or flaccidity.   Lymphadenopathy:      Head:      Right side of head: No submental or submandibular adenopathy.      Left side of head: No submental or submandibular adenopathy.      Cervical: No cervical adenopathy.      Right cervical: No superficial cervical adenopathy.     Left cervical: No superficial cervical adenopathy.      Upper Body:      Right upper body: No axillary adenopathy.      Left upper  body: No axillary adenopathy.   Skin:     General: Skin is warm and dry.      Capillary Refill: Capillary refill takes less than 2 seconds.      Coloration: Skin is not jaundiced or pale.      Findings: No erythema.      Nails: There is no clubbing.   Neurological:      Mental Status: She is alert and oriented to person, place, and time.      Cranial Nerves: No cranial nerve deficit or facial asymmetry.      Sensory: No sensory deficit.      Motor: No weakness, tremor, atrophy or abnormal muscle tone.      Coordination: Coordination normal.      Deep Tendon Reflexes: Reflexes are normal and symmetric.   Psychiatric:         Attention and Perception: She is attentive.         Mood and Affect: Mood normal. Mood is not anxious or depressed.         Speech: Speech normal.         Behavior: Behavior normal. Behavior is cooperative.         Thought Content: Thought content normal.         Cognition and Memory: Cognition normal.         Judgment: Judgment normal.         Assessment & Plan     PHQ-9 Depression Screening  Little interest or pleasure in doing things? 1-->several days   Feeling down, depressed, or hopeless? 1-->several days   Trouble falling or staying asleep, or sleeping too much? 0-->not at all   Feeling tired or having little energy? 2-->more than half the days   Poor appetite or overeating? 2-->more than half the days   Feeling bad about yourself - or that you are a failure or have let yourself or your family down? 3-->nearly every day   Trouble concentrating on things, such as reading the newspaper or watching television? 3-->nearly every day   Moving or speaking so slowly that other people could have noticed? Or the opposite - being so fidgety or restless that you have been moving around a lot more than usual? 3-->nearly every day   Thoughts that you would be better off dead, or of hurting yourself in some way? 1-->several days   PHQ-9 Total Score 16   If you checked off any problems, how difficult have  these problems made it for you to do your work, take care of things at home, or get along with other people? very difficult     Patient screened positive for depression based on a PHQ-9 score of 16 on 12/11/2023. Follow-up recommendations include: PCP managing depression and Prescribed antidepressant medication treatment.      Diagnoses and all orders for this visit:    1. Visit for annual health examination (Primary)    2. Encounter for gynecological examination with Papanicolaou smear of cervix  -     Liquid-based Pap Smear, Screening; Future    3. Anxiety disorder due to general medical condition with panic attack  Comments:  Continue Ativan and Wellbutrin 300 mg.  Continue to work on stress reduction.  if not improved over the next month, will consider adding mood stabilizer  Assessment & Plan:  Continue PRN Ativan and Wellbutrin 300 mg daily  Stress reduction advised (examples:  make time for self, Reading, Listening to music, Exercise, keeping a journal, venting to a confidant, etc.)        Orders:  -     LORazepam (ATIVAN) 1 MG tablet; Take 1 tablet by mouth Daily As Needed for Anxiety.  Dispense: 30 tablet; Refill: 0    4. Hot flashes  Comments:  increase in minivelle patch  Orders:  -     estradiol (VIVELLE-DOT) 0.1 MG/24HR patch; Place 1 patch on the skin as directed by provider 2 (Two) Times a Week.  Dispense: 8 each; Refill: 2    5. Hirsutism  Comments:  will order Vaniqa.  Patient understands it may not be covered.  Orders:  -     Eflornithine HCl (Vaniqa) 13.9 % cream; Apply 1 Application topically to the appropriate area as directed 2 (Two) Times a Day With Meals for 30 days.  Dispense: 45 g; Refill: 2  -     spironolactone (Aldactone) 25 MG tablet; Take 1 tablet by mouth Daily.  Dispense: 30 tablet; Refill: 1    6. Stress incontinence due to pelvic organ prolapse  Comments:  with rectocele  Orders:  -     Ambulatory Referral to Gynecology       Ekaterina Nelson  reports that she quit smoking about 7  years ago. Her smoking use included cigarettes. She has a 23.00 pack-year smoking history. She has never used smokeless tobacco.. I have educated her on the risk of diseases from using tobacco products such as cancer, COPD, and heart disease.     Understands disease processes and need for medications.  Understands reasons for urgent and emergent care.  Patient (& family) verbalized agreement for treatment plan.   Emotional support and active listening provided.  Patient provided time to verbalize feelings.  Counseling provided today including importance of good nutrition, exercise as tolerated, dental health, stress reduction and mental health. Importance of immunizations discussed.   Appropriate screenings based on gender (paps, breast exam, mammogram, PSA, colon screens, etc).     Counseled on safe sex practices and STD prevention.   Counseling regarding gun and water safety, domestic violence, and seatbelt use.      Pap and breast exam today.   Will plan for updated labs    RTC 1 month, sooner if needed.           This document has been electronically signed by:  ILEANA Vega FNP-C Dragon disclaimer:  Part of this note may be an electronic transcription/translation of spoken language to printed text using the Dragon Dictation System.

## 2023-12-12 DIAGNOSIS — E53.8 VITAMIN B 12 DEFICIENCY: ICD-10-CM

## 2023-12-12 RX ORDER — CYANOCOBALAMIN 1000 UG/ML
INJECTION, SOLUTION INTRAMUSCULAR; SUBCUTANEOUS
Qty: 4 ML | Refills: 1 | Status: SHIPPED | OUTPATIENT
Start: 2023-12-12

## 2023-12-12 RX ORDER — OMEPRAZOLE 40 MG/1
40 CAPSULE, DELAYED RELEASE ORAL DAILY
Qty: 90 CAPSULE | Refills: 1 | Status: SHIPPED | OUTPATIENT
Start: 2023-12-12

## 2024-01-02 DIAGNOSIS — L68.0 HIRSUTISM: ICD-10-CM

## 2024-01-02 RX ORDER — SPIRONOLACTONE 25 MG/1
25 TABLET ORAL DAILY
Qty: 30 TABLET | Refills: 1 | Status: SHIPPED | OUTPATIENT
Start: 2024-01-02

## 2024-01-03 ENCOUNTER — TELEPHONE (OUTPATIENT)
Dept: FAMILY MEDICINE CLINIC | Facility: CLINIC | Age: 49
End: 2024-01-03
Payer: COMMERCIAL

## 2024-01-03 NOTE — TELEPHONE ENCOUNTER
----- Message from ILEANA Vega sent at 1/3/2024  2:48 PM EST -----  It is sent   ----- Message -----  From: Ann Hong MA  Sent: 1/3/2024   1:14 PM EST  To: ILEANA Vega    Patient calling, 1 mg ozempic is out of stock. But they have the 2mg, patient wants to know if you are ok with her going up in dose, if so, just send to pharmacy

## 2024-01-10 ENCOUNTER — OFFICE VISIT (OUTPATIENT)
Dept: FAMILY MEDICINE CLINIC | Facility: CLINIC | Age: 49
End: 2024-01-10
Payer: COMMERCIAL

## 2024-01-10 VITALS
SYSTOLIC BLOOD PRESSURE: 126 MMHG | RESPIRATION RATE: 14 BRPM | HEIGHT: 61 IN | WEIGHT: 138 LBS | OXYGEN SATURATION: 98 % | TEMPERATURE: 98.2 F | HEART RATE: 66 BPM | DIASTOLIC BLOOD PRESSURE: 74 MMHG | BODY MASS INDEX: 26.06 KG/M2

## 2024-01-10 DIAGNOSIS — I10 ESSENTIAL HYPERTENSION: Chronic | ICD-10-CM

## 2024-01-10 DIAGNOSIS — R30.0 DYSURIA: Primary | ICD-10-CM

## 2024-01-10 DIAGNOSIS — F06.4 ANXIETY DISORDER DUE TO GENERAL MEDICAL CONDITION WITH PANIC ATTACK: ICD-10-CM

## 2024-01-10 DIAGNOSIS — E16.1 HYPERINSULINEMIA: ICD-10-CM

## 2024-01-10 DIAGNOSIS — Z79.899 LONG-TERM USE OF HIGH-RISK MEDICATION: ICD-10-CM

## 2024-01-10 DIAGNOSIS — F41.0 ANXIETY DISORDER DUE TO GENERAL MEDICAL CONDITION WITH PANIC ATTACK: ICD-10-CM

## 2024-01-10 LAB
BILIRUB BLD-MCNC: ABNORMAL MG/DL
CLARITY, POC: ABNORMAL
COLOR UR: YELLOW
EXPIRATION DATE: ABNORMAL
GLUCOSE UR STRIP-MCNC: NEGATIVE MG/DL
KETONES UR QL: NEGATIVE
LEUKOCYTE EST, POC: NEGATIVE
Lab: ABNORMAL
NITRITE UR-MCNC: NEGATIVE MG/ML
PH UR: 6 [PH] (ref 5–8)
PROT UR STRIP-MCNC: ABNORMAL MG/DL
RBC # UR STRIP: NEGATIVE /UL
SP GR UR: 1.03 (ref 1–1.03)
UROBILINOGEN UR QL: NORMAL

## 2024-01-10 RX ORDER — LORAZEPAM 1 MG/1
1 TABLET ORAL DAILY PRN
Qty: 30 TABLET | Refills: 0 | Status: SHIPPED | OUTPATIENT
Start: 2024-01-10

## 2024-01-10 RX ORDER — NITROFURANTOIN 25; 75 MG/1; MG/1
100 CAPSULE ORAL EVERY 12 HOURS SCHEDULED
Qty: 20 CAPSULE | Refills: 0 | Status: SHIPPED | OUTPATIENT
Start: 2024-01-10

## 2024-01-10 NOTE — PROGRESS NOTES
"Subjective   Ekaterina Nelson is a 48 y.o. female.     Chief Complaint   Patient presents with    Hypertension       History of Present Illness     HTN-ongoing.  On Lisinopril 20 mg, spironolactone 25 mg.  No negative side effects.  Occasional CP.  No SOA.  No palpitations.  She has been followed by cardiology but has not had a recent appt.    Thyroid disorder-on synthroid 50 mcg.  No negative side effects.  No associated symptoms such as heat or cold intolerance.  No skin changes are reported.  She does feel she has had some hair changes including texture and thickness changes.   Dysuria-discomfort and pressure and burning with urination.  Some sensation of bladder spasm.  Has been present for about 3 days.  Noted some odor change for one day.  No fever or chills.  She reports she has not been drinking well.  BM's weekly \"but that is normal for me\".   PCOS and insulin resistance-was off ozempic for about 3 weeks.  She had to increase her dose to 2 mg and has now had 2 injections of that dose.  She is tolerating well.   She feels her appetite has been controlled.  She has not had any s/sx of blood sugars elevations.      The following portions of the patient's history were reviewed and updated as appropriate: CC, ROS, allergies, current medications, past family history, past medical history, past social history, past surgical history and problem list.      Review of Systems   Constitutional:  Positive for fatigue. Negative for appetite change and fever.   HENT:  Negative for congestion, ear pain, nosebleeds, postnasal drip, rhinorrhea, sore throat, tinnitus, trouble swallowing and voice change.    Eyes:  Negative for blurred vision, photophobia and visual disturbance.   Respiratory:  Negative for cough, chest tightness, shortness of breath and wheezing.    Cardiovascular:  Negative for chest pain and palpitations.   Gastrointestinal:  Positive for constipation. Negative for abdominal pain, blood in stool, diarrhea, " "nausea and GERD.   Endocrine: Negative for cold intolerance, heat intolerance and polydipsia.        Tolerating HRT well.  She has noted less hot flashes   Genitourinary:  Positive for decreased urine volume, dysuria and pelvic pressure. Negative for difficulty urinating and hematuria.   Musculoskeletal:  Positive for arthralgias and myalgias. Negative for back pain and gait problem.   Skin:  Negative for color change, pallor and wound.   Allergic/Immunologic: Negative.    Neurological:  Negative for dizziness, syncope, numbness and headache.   Hematological: Negative.    Psychiatric/Behavioral:  Positive for stress. Negative for decreased concentration, sleep disturbance, suicidal ideas and depressed mood. The patient is not nervous/anxious.    All other systems reviewed and are negative.      Objective     /74   Pulse 66   Temp 98.2 °F (36.8 °C)   Resp 14   Ht 154.9 cm (60.98\")   Wt 62.6 kg (138 lb)   SpO2 98%   BMI 26.09 kg/m²     Physical Exam  Vitals reviewed.   Constitutional:       General: She is not in acute distress.     Appearance: She is well-developed. She is not diaphoretic.   HENT:      Head: Normocephalic and atraumatic.      Jaw: No tenderness.      Right Ear: Hearing, tympanic membrane, ear canal and external ear normal.      Left Ear: Hearing, tympanic membrane, ear canal and external ear normal.      Nose: Nose normal. No nasal tenderness or congestion.      Right Sinus: No maxillary sinus tenderness or frontal sinus tenderness.      Left Sinus: No maxillary sinus tenderness or frontal sinus tenderness.      Mouth/Throat:      Lips: Pink.      Mouth: Mucous membranes are moist.      Pharynx: Oropharynx is clear. Uvula midline.   Eyes:      General: Lids are normal. No scleral icterus.     Extraocular Movements:      Right eye: Normal extraocular motion and no nystagmus.      Left eye: Normal extraocular motion and no nystagmus.      Conjunctiva/sclera: Conjunctivae normal.      " Pupils: Pupils are equal, round, and reactive to light.   Neck:      Thyroid: No thyromegaly or thyroid tenderness.      Vascular: No carotid bruit or JVD.      Trachea: No tracheal tenderness.   Cardiovascular:      Rate and Rhythm: Normal rate and regular rhythm.      Pulses:           Dorsalis pedis pulses are 2+ on the right side and 2+ on the left side.        Posterior tibial pulses are 2+ on the right side and 2+ on the left side.      Heart sounds: Normal heart sounds, S1 normal and S2 normal. No murmur heard.  Pulmonary:      Effort: Pulmonary effort is normal. No accessory muscle usage, prolonged expiration or respiratory distress.      Breath sounds: Normal breath sounds.   Chest:      Chest wall: No tenderness.   Breasts:     Right: No swelling, bleeding, inverted nipple, mass, nipple discharge, skin change or tenderness.      Left: No swelling, bleeding, inverted nipple, mass, nipple discharge, skin change or tenderness.   Abdominal:      General: Bowel sounds are normal. There is no distension.      Palpations: Abdomen is soft. There is no hepatomegaly, splenomegaly or mass.      Tenderness: There is abdominal tenderness in the suprapubic area.   Genitourinary:     Vagina: No vaginal discharge or tenderness.      Cervix: Normal. No cervical motion tenderness, friability or erythema.      Uterus: Normal.       Adnexa:         Right: No mass, tenderness or fullness.          Left: No mass, tenderness or fullness.     Musculoskeletal:         General: Tenderness present.      Cervical back: Normal range of motion and neck supple. Spinous process tenderness and muscular tenderness present.      Right lower leg: No edema.      Left lower leg: No edema.      Comments: No muscular atrophy or flaccidity.   Lymphadenopathy:      Head:      Right side of head: No submental or submandibular adenopathy.      Left side of head: No submental or submandibular adenopathy.      Cervical: No cervical adenopathy.       Right cervical: No superficial cervical adenopathy.     Left cervical: No superficial cervical adenopathy.      Upper Body:      Right upper body: No axillary adenopathy.      Left upper body: No axillary adenopathy.   Skin:     General: Skin is warm and dry.      Capillary Refill: Capillary refill takes less than 2 seconds.      Coloration: Skin is not jaundiced or pale.      Findings: No erythema.      Nails: There is no clubbing.   Neurological:      Mental Status: She is alert and oriented to person, place, and time.      Cranial Nerves: No cranial nerve deficit or facial asymmetry.      Sensory: No sensory deficit.      Motor: No weakness, tremor, atrophy or abnormal muscle tone.      Coordination: Coordination normal.      Deep Tendon Reflexes: Reflexes are normal and symmetric.   Psychiatric:         Attention and Perception: She is attentive.         Mood and Affect: Mood normal. Mood is not anxious or depressed.         Speech: Speech normal.         Behavior: Behavior normal. Behavior is cooperative.         Thought Content: Thought content normal.         Cognition and Memory: Cognition normal.         Judgment: Judgment normal.         Diagnoses and all orders for this visit:    1. Dysuria (Primary)  -     POC Urinalysis Dipstick, Automated  -     nitrofurantoin, macrocrystal-monohydrate, (Macrobid) 100 MG capsule; Take 1 capsule by mouth Every 12 (Twelve) Hours.  Dispense: 20 capsule; Refill: 0    2. Anxiety disorder due to general medical condition with panic attack  Comments:  Continue Ativan and Wellbutrin 300 mg.  Continue to work on stress reduction.  Orders:  -     LORazepam (ATIVAN) 1 MG tablet; Take 1 tablet by mouth Daily As Needed for Anxiety.  Dispense: 30 tablet; Refill: 0    3. Long-term use of high-risk medication  -     Urine Drug Screen - Urine, Clean Catch; Future  -     Urine Drug Screen - Urine, Clean Catch    4. Essential hypertension  Assessment & Plan:  Continue lisinopril 20 mg  continue spironolactone 25 mg.   Report any negative side effects.  Ambulatory BP monitoring either at home or random community checks.  Patient to report continued elevations >140/90.  Patient may come by office for checks if needed.         5. Hyperinsulinemia  Comments:  continue ozemptic.  continue to work on diet changes.  encouraged adequate protein intake.       Understands disease processes and need for medications.  Understands reasons for urgent and emergent care.  Patient (& family) verbalized agreement for treatment plan.   Emotional support and active listening provided.  Patient provided time to verbalize feelings.    RTC 2 months, sooner if needed.             This document has been electronically signed by:  ILEANA Vega, DOMITILAP-C    Dragon disclaimer:  Part of this note may be an electronic transcription/translation of spoken language to printed text using the Dragon Dictation System.

## 2024-01-21 NOTE — ASSESSMENT & PLAN NOTE
Continue lisinopril 20 mg continue spironolactone 25 mg.   Report any negative side effects.  Ambulatory BP monitoring either at home or random community checks.  Patient to report continued elevations >140/90.  Patient may come by office for checks if needed.

## 2024-02-19 DIAGNOSIS — F41.0 ANXIETY DISORDER DUE TO GENERAL MEDICAL CONDITION WITH PANIC ATTACK: ICD-10-CM

## 2024-02-19 DIAGNOSIS — R30.0 DYSURIA: ICD-10-CM

## 2024-02-19 DIAGNOSIS — F06.4 ANXIETY DISORDER DUE TO GENERAL MEDICAL CONDITION WITH PANIC ATTACK: ICD-10-CM

## 2024-02-19 RX ORDER — NITROFURANTOIN 25; 75 MG/1; MG/1
100 CAPSULE ORAL EVERY 12 HOURS
Qty: 20 CAPSULE | Refills: 0 | OUTPATIENT
Start: 2024-02-19

## 2024-02-19 RX ORDER — LORAZEPAM 1 MG/1
1 TABLET ORAL DAILY PRN
Qty: 30 TABLET | Refills: 0 | Status: SHIPPED | OUTPATIENT
Start: 2024-02-19

## 2024-02-22 ENCOUNTER — OFFICE VISIT (OUTPATIENT)
Dept: FAMILY MEDICINE CLINIC | Facility: CLINIC | Age: 49
End: 2024-02-22
Payer: MEDICAID

## 2024-02-22 VITALS
WEIGHT: 137 LBS | SYSTOLIC BLOOD PRESSURE: 118 MMHG | BODY MASS INDEX: 25.86 KG/M2 | DIASTOLIC BLOOD PRESSURE: 70 MMHG | HEART RATE: 64 BPM | OXYGEN SATURATION: 98 % | RESPIRATION RATE: 14 BRPM | HEIGHT: 61 IN

## 2024-02-22 DIAGNOSIS — K59.01 SLOW TRANSIT CONSTIPATION: ICD-10-CM

## 2024-02-22 DIAGNOSIS — Z01.818 PRE-OPERATIVE CLEARANCE: Primary | ICD-10-CM

## 2024-02-22 PROCEDURE — 99213 OFFICE O/P EST LOW 20 MIN: CPT | Performed by: NURSE PRACTITIONER

## 2024-02-22 PROCEDURE — 1160F RVW MEDS BY RX/DR IN RCRD: CPT | Performed by: NURSE PRACTITIONER

## 2024-02-22 PROCEDURE — 3074F SYST BP LT 130 MM HG: CPT | Performed by: NURSE PRACTITIONER

## 2024-02-22 PROCEDURE — 3078F DIAST BP <80 MM HG: CPT | Performed by: NURSE PRACTITIONER

## 2024-02-22 PROCEDURE — 1159F MED LIST DOCD IN RCRD: CPT | Performed by: NURSE PRACTITIONER

## 2024-02-22 RX ORDER — POLYETHYLENE GLYCOL 3350 17 G/17G
17 POWDER, FOR SOLUTION ORAL 2 TIMES DAILY
Qty: 510 G | Refills: 5 | Status: SHIPPED | OUTPATIENT
Start: 2024-02-22

## 2024-02-22 RX ORDER — POLYETHYLENE GLYCOL 3350 17 G/17G
17 POWDER, FOR SOLUTION ORAL 2 TIMES DAILY
Qty: 60 PACKET | Refills: 5 | OUTPATIENT
Start: 2024-02-22

## 2024-02-22 RX ORDER — POLYETHYLENE GLYCOL 3350 17 G/17G
17 POWDER, FOR SOLUTION ORAL 2 TIMES DAILY
Qty: 60 PACKET | Refills: 5 | Status: SHIPPED | OUTPATIENT
Start: 2024-02-22 | End: 2024-02-22 | Stop reason: RX

## 2024-02-22 RX ORDER — DOCUSATE SODIUM 100 MG/1
200 CAPSULE, LIQUID FILLED ORAL 2 TIMES DAILY
Qty: 120 CAPSULE | Refills: 2 | Status: SHIPPED | OUTPATIENT
Start: 2024-02-22

## 2024-02-22 NOTE — PROGRESS NOTES
Subjective   Ekaterina Nelson is a 49 y.o. female.     Chief Complaint   Patient presents with    surgery clearance       History of Present Illness     Pre-op-for hysterectomy, bladder lift, A & P repair per GYN.  She is planning to keep her ovaries if possible as she is worried about hormone changes and menopause symptoms.  Surgery date is march 4.  She will stop her ASA and Plavix 75 mg at least 5 days prior.   She will hold her hormone patch and and her ozempic to be stopped for 1 month.   She will need a letter for risk assessment for her surgery. She does not have any new concerns today.     The following portions of the patient's history were reviewed and updated as appropriate: CC, ROS, allergies, current medications, past family history, past medical history, past social history, past surgical history and problem list.      Review of Systems   Constitutional:  Positive for fatigue. Negative for appetite change and fever.   HENT:  Negative for congestion, ear pain, nosebleeds, postnasal drip, rhinorrhea, sore throat, tinnitus, trouble swallowing and voice change.    Eyes:  Negative for blurred vision, photophobia and visual disturbance.   Respiratory:  Negative for cough, chest tightness, shortness of breath and wheezing.    Cardiovascular:  Negative for chest pain and palpitations.   Gastrointestinal:  Positive for constipation. Negative for abdominal pain, blood in stool, diarrhea, nausea and GERD.   Endocrine: Negative for cold intolerance, heat intolerance and polydipsia.   Genitourinary:  Negative for decreased urine volume, difficulty urinating, dysuria and hematuria.   Musculoskeletal:  Positive for arthralgias and myalgias. Negative for back pain and gait problem.   Skin:  Negative for color change, pallor and wound.   Allergic/Immunologic: Negative.    Neurological:  Negative for dizziness, syncope, numbness and headache.   Hematological: Negative.    Psychiatric/Behavioral:  Positive for stress.  "Negative for decreased concentration, sleep disturbance, suicidal ideas and depressed mood. The patient is not nervous/anxious.    All other systems reviewed and are negative.      Objective     /70   Pulse 64   Resp 14   Ht 154.9 cm (60.98\")   Wt 62.1 kg (137 lb)   SpO2 98%   BMI 25.90 kg/m²     Physical Exam  Vitals reviewed.   Constitutional:       General: She is not in acute distress.     Appearance: She is well-developed. She is not diaphoretic.   HENT:      Head: Normocephalic and atraumatic.      Jaw: No tenderness.      Right Ear: Hearing, tympanic membrane, ear canal and external ear normal.      Left Ear: Hearing, tympanic membrane, ear canal and external ear normal.      Nose: Nose normal. No nasal tenderness or congestion.      Right Sinus: No maxillary sinus tenderness or frontal sinus tenderness.      Left Sinus: No maxillary sinus tenderness or frontal sinus tenderness.      Mouth/Throat:      Lips: Pink.      Mouth: Mucous membranes are moist.      Pharynx: Oropharynx is clear. Uvula midline.   Eyes:      General: Lids are normal. No scleral icterus.     Extraocular Movements:      Right eye: Normal extraocular motion and no nystagmus.      Left eye: Normal extraocular motion and no nystagmus.      Conjunctiva/sclera: Conjunctivae normal.      Pupils: Pupils are equal, round, and reactive to light.   Neck:      Thyroid: No thyromegaly or thyroid tenderness.      Vascular: No carotid bruit or JVD.      Trachea: No tracheal tenderness.   Cardiovascular:      Rate and Rhythm: Normal rate and regular rhythm.      Pulses:           Dorsalis pedis pulses are 2+ on the right side and 2+ on the left side.        Posterior tibial pulses are 2+ on the right side and 2+ on the left side.      Heart sounds: Normal heart sounds, S1 normal and S2 normal. No murmur heard.  Pulmonary:      Effort: Pulmonary effort is normal. No accessory muscle usage, prolonged expiration or respiratory distress.      " Breath sounds: Normal breath sounds.   Chest:      Chest wall: No tenderness.   Abdominal:      General: Bowel sounds are normal. There is no distension.      Palpations: Abdomen is soft. There is no hepatomegaly, splenomegaly or mass.      Tenderness: There is no abdominal tenderness.   Musculoskeletal:         General: No swelling or tenderness.      Cervical back: Normal range of motion and neck supple.      Right lower leg: No edema.      Left lower leg: No edema.      Comments: No muscular atrophy or flaccidity.  Generalized arthralgia   Lymphadenopathy:      Head:      Right side of head: No submental or submandibular adenopathy.      Left side of head: No submental or submandibular adenopathy.      Cervical: No cervical adenopathy.      Right cervical: No superficial cervical adenopathy.     Left cervical: No superficial cervical adenopathy.   Skin:     General: Skin is warm and dry.      Capillary Refill: Capillary refill takes less than 2 seconds.      Coloration: Skin is not jaundiced or pale.      Findings: No erythema.      Nails: There is no clubbing.   Neurological:      Mental Status: She is alert and oriented to person, place, and time.      Cranial Nerves: No cranial nerve deficit or facial asymmetry.      Sensory: No sensory deficit.      Motor: No weakness, tremor, atrophy or abnormal muscle tone.      Coordination: Coordination normal.      Gait: Gait abnormal (mild antalgia).      Deep Tendon Reflexes: Reflexes are normal and symmetric.   Psychiatric:         Attention and Perception: She is attentive.         Mood and Affect: Mood normal. Mood is not anxious or depressed.         Speech: Speech normal.         Behavior: Behavior normal. Behavior is cooperative.         Thought Content: Thought content normal.         Cognition and Memory: Cognition normal.         Judgment: Judgment normal.         Diagnoses and all orders for this visit:    1. Pre-operative clearance  (Primary)  Comments:  continue under care of GYN.  discussed patient's concern about stopping her blood thinner.  Reume as directed by GYN.  May call this office PRN concerns.    2. Slow transit constipation  Comments:  Bowel care advised:  Increase PO fluids for hydration, exercise regularly, Increase fiber.  Stool softeners PRN  Orders:  -     Discontinue: polyethylene glycol (MIRALAX) 17 g packet; Take 17 g by mouth 2 (Two) Times a Day.  Dispense: 60 packet; Refill: 5  -     docusate sodium (COLACE) 100 MG capsule; Take 2 capsules by mouth 2 (Two) Times a Day.  Dispense: 120 capsule; Refill: 2      Understands disease processes and need for medications.  Understands reasons for urgent and emergent care.  Patient (& family) verbalized agreement for treatment plan.   Emotional support and active listening provided.  Patient provided time to verbalize feelings.    Cleared for surgery.  Low to moderate surgical risk.      Discussed with patient to be active about her bowel care.  Avoid straining or lifting.     RTC 2-3 weeks for re-eval, sooner for worsening of symptoms.           This document has been electronically signed by:  ILEANA Vega, FNP-C    Dragon disclaimer:  Part of this note may be an electronic transcription/translation of spoken language to printed text using the Dragon Dictation System.

## 2024-02-22 NOTE — LETTER
2024    RE:  Ekaterina Nelson  :  1975    To Whom It May Concern:    Ekaterina is cleared for surgery with low to moderate risk.  No respiratory concerns and her BP is controlled on medications.  Please feel free to contact this office for any concerns.     Sincerely,         ILEANA Mathews

## 2024-02-27 DIAGNOSIS — R23.2 HOT FLASHES: ICD-10-CM

## 2024-02-27 RX ORDER — ESTRADIOL 0.1 MG/D
FILM, EXTENDED RELEASE TRANSDERMAL
Qty: 8 PATCH | Refills: 2 | Status: SHIPPED | OUTPATIENT
Start: 2024-02-27

## 2024-02-29 DIAGNOSIS — F06.4 ANXIETY DISORDER DUE TO GENERAL MEDICAL CONDITION WITH PANIC ATTACK: ICD-10-CM

## 2024-02-29 DIAGNOSIS — I25.10 CORONARY ARTERY DISEASE INVOLVING NATIVE CORONARY ARTERY OF NATIVE HEART WITHOUT ANGINA PECTORIS: ICD-10-CM

## 2024-02-29 DIAGNOSIS — F41.0 ANXIETY DISORDER DUE TO GENERAL MEDICAL CONDITION WITH PANIC ATTACK: ICD-10-CM

## 2024-02-29 RX ORDER — ASPIRIN 81 MG/1
81 TABLET, COATED ORAL DAILY
Qty: 90 TABLET | Refills: 2 | Status: SHIPPED | OUTPATIENT
Start: 2024-02-29

## 2024-02-29 RX ORDER — LORAZEPAM 1 MG/1
1 TABLET ORAL DAILY PRN
Qty: 30 TABLET | Refills: 0 | OUTPATIENT
Start: 2024-02-29

## 2024-02-29 RX ORDER — BUPROPION HYDROCHLORIDE 300 MG/1
300 TABLET ORAL EVERY MORNING
Qty: 90 TABLET | Refills: 1 | Status: SHIPPED | OUTPATIENT
Start: 2024-02-29

## 2024-02-29 RX ORDER — CLOPIDOGREL BISULFATE 75 MG/1
75 TABLET ORAL DAILY
Qty: 90 TABLET | Refills: 2 | Status: SHIPPED | OUTPATIENT
Start: 2024-02-29

## 2024-03-08 RX ORDER — SEMAGLUTIDE 2.68 MG/ML
INJECTION, SOLUTION SUBCUTANEOUS
Qty: 3 ML | Refills: 1 | Status: SHIPPED | OUTPATIENT
Start: 2024-03-08

## 2024-03-13 ENCOUNTER — OFFICE VISIT (OUTPATIENT)
Dept: FAMILY MEDICINE CLINIC | Facility: CLINIC | Age: 49
End: 2024-03-13
Payer: MEDICAID

## 2024-03-13 VITALS
SYSTOLIC BLOOD PRESSURE: 116 MMHG | BODY MASS INDEX: 25.68 KG/M2 | RESPIRATION RATE: 14 BRPM | OXYGEN SATURATION: 98 % | DIASTOLIC BLOOD PRESSURE: 74 MMHG | HEIGHT: 61 IN | HEART RATE: 64 BPM | WEIGHT: 136 LBS

## 2024-03-13 DIAGNOSIS — Z90.710 S/P VH (VAGINAL HYSTERECTOMY): ICD-10-CM

## 2024-03-13 DIAGNOSIS — E06.9 THYROIDITIS: ICD-10-CM

## 2024-03-13 DIAGNOSIS — F41.0 ANXIETY DISORDER DUE TO GENERAL MEDICAL CONDITION WITH PANIC ATTACK: Primary | ICD-10-CM

## 2024-03-13 DIAGNOSIS — R73.03 PREDIABETES: ICD-10-CM

## 2024-03-13 DIAGNOSIS — I10 ESSENTIAL HYPERTENSION: Chronic | ICD-10-CM

## 2024-03-13 DIAGNOSIS — F06.4 ANXIETY DISORDER DUE TO GENERAL MEDICAL CONDITION WITH PANIC ATTACK: Primary | ICD-10-CM

## 2024-03-13 RX ORDER — HYDROCODONE BITARTRATE AND ACETAMINOPHEN 5; 325 MG/1; MG/1
1 TABLET ORAL EVERY 4 HOURS PRN
COMMUNITY

## 2024-03-13 RX ORDER — LORAZEPAM 1 MG/1
1 TABLET ORAL DAILY PRN
Qty: 30 TABLET | Refills: 0 | Status: SHIPPED | OUTPATIENT
Start: 2024-03-13

## 2024-03-13 RX ORDER — ARIPIPRAZOLE 2 MG/1
2 TABLET ORAL DAILY
Qty: 30 TABLET | Refills: 0 | Status: SHIPPED | OUTPATIENT
Start: 2024-03-13

## 2024-03-13 NOTE — PROGRESS NOTES
Subjective   Ekaterina Nelson is a 49 y.o. female.     Chief Complaint   Patient presents with    Hypertension       History of Present Illness     Hospital follow-up-patient with recent hysterectomy.  On March 4, 2024.  She had a total laparoscopic hysterectomy with salpingectomy.  She also had cystoscopy with A&P repair.   She has some cramping still.  She has not had any vaginal bleeding.  She does have a post op telehealth visit today.  She reports some abdomen distention but is improving.  Bowels are moving ok.    Hypertension-chronic and ongoing.  Patient is currently taking lisinopril 20 mg with spironolactone 25 mg.  She has a history of CAD and CVD.  She is followed by cardiology.  She is on Plavix 75 mg and aspirin 81 mg for anticoagulation.  Hypothyroidism-chronic and ongoing.  Patient is currently taking Synthroid 50 mcg.  No negative side effects.  No associated symptoms such as CP, SOA, HA, or dizziness.  Prediabetes-patient has been off of her Ozempic for her surgery.  She has resumed and tolerated well.  She reports while she was off the medication she had a significant increase in hypoglycemia episodes.  She reports that has already improved.  She also felt while she was off the medication that she had some increased fluid retention that has also resolved since resuming the Ozempic.  Depression-reports she has had some increased feelings of overwhelming sadness and dread.  She is on Wellbutrin 300 mg.  She reports that she does not feel it is anxiety.  She would be interested in any additional medication that might be able to help her with symptom improvement.    The following portions of the patient's history were reviewed and updated as appropriate: CC, ROS, allergies, current medications, past family history, past medical history, past social history, past surgical history and problem list.    Review of Systems   Constitutional:  Positive for fatigue. Negative for appetite change and fever.   HENT:   "Negative for congestion, ear pain, nosebleeds, postnasal drip, rhinorrhea, sore throat, tinnitus, trouble swallowing and voice change.    Eyes:  Negative for blurred vision, photophobia and visual disturbance.   Respiratory:  Negative for cough, chest tightness, shortness of breath and wheezing.    Cardiovascular:  Negative for chest pain and palpitations.   Gastrointestinal:  Negative for abdominal pain, blood in stool, constipation, diarrhea, nausea and GERD.   Endocrine: Negative for cold intolerance, heat intolerance and polydipsia.   Genitourinary:  Negative for decreased urine volume, difficulty urinating, dysuria and hematuria.   Musculoskeletal:  Positive for arthralgias and myalgias. Negative for back pain and gait problem.   Skin:  Negative for color change, pallor and wound.   Allergic/Immunologic: Negative.    Neurological:  Negative for dizziness, syncope, numbness and headache.   Hematological: Negative.    Psychiatric/Behavioral:  Positive for stress. Negative for decreased concentration, sleep disturbance, suicidal ideas and depressed mood. The patient is not nervous/anxious.    All other systems reviewed and are negative.      Objective     /74   Pulse 64   Resp 14   Ht 154.9 cm (60.98\")   Wt 61.7 kg (136 lb)   SpO2 98%   BMI 25.71 kg/m²     Physical Exam  Vitals reviewed.   Constitutional:       General: She is not in acute distress.     Appearance: She is well-developed. She is not diaphoretic.   HENT:      Head: Normocephalic and atraumatic.      Jaw: No tenderness.      Right Ear: Hearing, tympanic membrane, ear canal and external ear normal.      Left Ear: Hearing, tympanic membrane, ear canal and external ear normal.      Nose: Nose normal. No nasal tenderness or congestion.      Right Sinus: No maxillary sinus tenderness or frontal sinus tenderness.      Left Sinus: No maxillary sinus tenderness or frontal sinus tenderness.      Mouth/Throat:      Lips: Pink.      Mouth: Mucous " membranes are moist.      Pharynx: Oropharynx is clear. Uvula midline.   Eyes:      General: Lids are normal. No scleral icterus.     Extraocular Movements:      Right eye: Normal extraocular motion and no nystagmus.      Left eye: Normal extraocular motion and no nystagmus.      Conjunctiva/sclera: Conjunctivae normal.      Pupils: Pupils are equal, round, and reactive to light.   Neck:      Thyroid: No thyromegaly or thyroid tenderness.      Vascular: No carotid bruit or JVD.      Trachea: No tracheal tenderness.   Cardiovascular:      Rate and Rhythm: Normal rate and regular rhythm.      Pulses:           Dorsalis pedis pulses are 2+ on the right side and 2+ on the left side.        Posterior tibial pulses are 2+ on the right side and 2+ on the left side.      Heart sounds: Normal heart sounds, S1 normal and S2 normal. No murmur heard.  Pulmonary:      Effort: Pulmonary effort is normal. No accessory muscle usage, prolonged expiration or respiratory distress.      Breath sounds: Normal breath sounds.   Chest:      Chest wall: No tenderness.   Abdominal:      General: Bowel sounds are normal. There is no distension.      Palpations: Abdomen is soft. There is no hepatomegaly, splenomegaly or mass.      Tenderness: There is no abdominal tenderness.      Comments: Well-healing abdominal incisions.  No erythema or edema.  No evidence of discharge   Musculoskeletal:         General: Tenderness (generalized) present.      Cervical back: Normal range of motion and neck supple.      Right lower leg: No edema.      Left lower leg: No edema.      Comments: No muscular atrophy or flaccidity.   Lymphadenopathy:      Head:      Right side of head: No submental or submandibular adenopathy.      Left side of head: No submental or submandibular adenopathy.      Cervical: No cervical adenopathy.      Right cervical: No superficial cervical adenopathy.     Left cervical: No superficial cervical adenopathy.   Skin:     General: Skin  is warm and dry.      Capillary Refill: Capillary refill takes less than 2 seconds.      Coloration: Skin is not jaundiced or pale.      Findings: No erythema.      Nails: There is no clubbing.   Neurological:      Mental Status: She is alert and oriented to person, place, and time.      Cranial Nerves: No cranial nerve deficit or facial asymmetry.      Sensory: No sensory deficit.      Motor: No weakness, tremor, atrophy or abnormal muscle tone.      Coordination: Coordination normal.      Deep Tendon Reflexes: Reflexes are normal and symmetric.   Psychiatric:         Attention and Perception: She is attentive.         Mood and Affect: Mood normal. Mood is anxious and depressed.         Speech: Speech normal.         Behavior: Behavior normal. Behavior is cooperative.         Thought Content: Thought content normal.         Cognition and Memory: Cognition normal.         Judgment: Judgment normal.         Diagnoses and all orders for this visit:    1. Anxiety disorder due to general medical condition with panic attack (Primary)  Comments:  Continue Wellbutrin and as needed Ativan.  We will add Abilify 2 mg to her medication regimen.  Encourage patient to be active as she is able  Assessment & Plan:  Continue PRN Ativan and Wellbutrin 300 mg daily  Stress reduction advised (examples:  make time for self, Reading, Listening to music, Exercise, keeping a journal, venting to a confidant, etc.)  Will add abilify.    Orders:  -     ARIPiprazole (Abilify) 2 MG tablet; Take 1 tablet by mouth Daily.  Dispense: 30 tablet; Refill: 0  -     Ambulatory Referral to Psychiatry  -     LORazepam (ATIVAN) 1 MG tablet; Take 1 tablet by mouth Daily As Needed for Anxiety.  Dispense: 30 tablet; Refill: 0    2. S/P VH (vaginal hysterectomy)  Comments:  Continue under the care of gynecology.    3. Essential hypertension  Assessment & Plan:  Continue lisinopril 20 mg continue spironolactone 25 mg.   Report any negative side  effects.  Ambulatory BP monitoring either at home or random community checks.  Patient to report continued elevations >140/90.  Patient may come by office for checks if needed.         4. Prediabetes  Comments:  Continue Ozempic.  Continue to watch carb intake and increase protein    5. Thyroiditis  Comments:  Suspected due to abnormal labs and history of COVID infection.  continue Synthroid 50 mcg.      Understands disease processes and need for medications.  Understands reasons for urgent and emergent care.  Patient (& family) verbalized agreement for treatment plan.   Emotional support and active listening provided.  Patient provided time to verbalize feelings.    EILEEN/PMDP reviewed today and consistent.  Will refill prescribed controlled medication today.  Patient is aware they cannot receive narcotics from any other provider except if under care of pain management or speciality clinic.  Risk and benefits of medication use has been reviewed.  History and physical exam exhibit continued safe and appropriate use of controlled substances.  The patient is aware of the potential for addiction and dependence.  This patient has been made aware of the appropriate use of such medications, including potential risk of somnolence, limited ability to drive and / or work safely, and potential for overdose.    It has also been made clear that these medications are for use by this patient only, without concomitant use of alcohol or other substances unless prescribed/advised by medical provider.  Patient understands they may be subject to UDS and pill counts at random.    Patient considered to be low risk for addiction due to use of single controlled medications.  Patient understands and accepts these risks.  Patient need for medication will be reassessed at each visit.  Doses will be adjusted according to patient need and findings.    Goal of [place]: Patient will not have any adverse reactions of medication.  Patient have  reduction in anxiety symptoms with use of PRN Ativan as directed.  Patient will be able to remain active in an outside of home with use of Ativan for anxiety symptoms    EILEEN Patient Controlled Substance Report (from 3/14/2023 to 3/13/2024)    Dispensed  Strength Quantity Days Supply Provider Pharmacy   03/07/2024 Lorazepam 1MG 30 each 30 [facility]ILL[facility]   03/05/2024 Hydrocodone Bitartrate/Ac 325MG/5MG 30 each [facility]   01/13/2024 Lorazepam 1MG 30 each 30 [facility]ONILLA[facility]ORA[facility]   12/11/2023 Lorazepam 1MG 30 each 30 [facility]          RTC 1 month, sooner if needed.               This document has been electronically signed by:  ILEANA Vega FNP-C Dragon disclaimer:  Part of this note may be an electronic transcription/translation of spoken language to printed text using the Dragon Dictation System.

## 2024-03-13 NOTE — ASSESSMENT & PLAN NOTE
Continue PRN Ativan and Wellbutrin 300 mg daily  Stress reduction advised (examples:  make time for self, Reading, Listening to music, Exercise, keeping a journal, venting to a confidant, etc.)  Will add abilify.

## 2024-04-10 DIAGNOSIS — L68.0 HIRSUTISM: ICD-10-CM

## 2024-04-10 RX ORDER — SPIRONOLACTONE 25 MG/1
25 TABLET ORAL DAILY
Qty: 30 TABLET | Refills: 4 | Status: SHIPPED | OUTPATIENT
Start: 2024-04-10

## 2024-04-12 ENCOUNTER — OFFICE VISIT (OUTPATIENT)
Dept: PSYCHIATRY | Facility: CLINIC | Age: 49
End: 2024-04-12
Payer: MEDICAID

## 2024-04-12 VITALS
DIASTOLIC BLOOD PRESSURE: 78 MMHG | WEIGHT: 134 LBS | SYSTOLIC BLOOD PRESSURE: 124 MMHG | HEART RATE: 78 BPM | HEIGHT: 61 IN | OXYGEN SATURATION: 98 % | BODY MASS INDEX: 25.3 KG/M2

## 2024-04-12 DIAGNOSIS — F41.1 GENERALIZED ANXIETY DISORDER: ICD-10-CM

## 2024-04-12 DIAGNOSIS — F41.0 PANIC DISORDER WITHOUT AGORAPHOBIA: ICD-10-CM

## 2024-04-12 DIAGNOSIS — I10 ESSENTIAL HYPERTENSION: ICD-10-CM

## 2024-04-12 DIAGNOSIS — F43.10 POST TRAUMATIC STRESS DISORDER (PTSD): ICD-10-CM

## 2024-04-12 DIAGNOSIS — F33.1 MODERATE EPISODE OF RECURRENT MAJOR DEPRESSIVE DISORDER: Primary | ICD-10-CM

## 2024-04-12 RX ORDER — BUPROPION HYDROCHLORIDE 300 MG/1
300 TABLET ORAL EVERY MORNING
Start: 2024-04-12

## 2024-04-12 RX ORDER — ARIPIPRAZOLE 5 MG/1
5 TABLET ORAL DAILY
Qty: 30 TABLET | Refills: 0 | Status: SHIPPED | OUTPATIENT
Start: 2024-04-12
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
## 2024-04-13 RX ORDER — LISINOPRIL 20 MG/1
20 TABLET ORAL DAILY
Qty: 90 TABLET | Refills: 2 | Status: SHIPPED | OUTPATIENT
Start: 2024-04-13

## 2024-04-16 ENCOUNTER — OFFICE VISIT (OUTPATIENT)
Dept: FAMILY MEDICINE CLINIC | Facility: CLINIC | Age: 49
End: 2024-04-16
Payer: MEDICAID

## 2024-04-16 VITALS
HEART RATE: 84 BPM | SYSTOLIC BLOOD PRESSURE: 110 MMHG | DIASTOLIC BLOOD PRESSURE: 72 MMHG | HEIGHT: 61 IN | RESPIRATION RATE: 16 BRPM | BODY MASS INDEX: 24.92 KG/M2 | WEIGHT: 132 LBS | OXYGEN SATURATION: 99 %

## 2024-04-16 DIAGNOSIS — F41.0 ANXIETY DISORDER DUE TO GENERAL MEDICAL CONDITION WITH PANIC ATTACK: Chronic | ICD-10-CM

## 2024-04-16 DIAGNOSIS — G25.0 ESSENTIAL TREMOR: Primary | ICD-10-CM

## 2024-04-16 DIAGNOSIS — E06.9 THYROIDITIS: ICD-10-CM

## 2024-04-16 DIAGNOSIS — Z79.899 LONG-TERM USE OF HIGH-RISK MEDICATION: ICD-10-CM

## 2024-04-16 DIAGNOSIS — Z12.31 ENCOUNTER FOR SCREENING MAMMOGRAM FOR MALIGNANT NEOPLASM OF BREAST: ICD-10-CM

## 2024-04-16 DIAGNOSIS — F06.4 ANXIETY DISORDER DUE TO GENERAL MEDICAL CONDITION WITH PANIC ATTACK: Chronic | ICD-10-CM

## 2024-04-16 PROCEDURE — 1160F RVW MEDS BY RX/DR IN RCRD: CPT | Performed by: NURSE PRACTITIONER

## 2024-04-16 PROCEDURE — 1159F MED LIST DOCD IN RCRD: CPT | Performed by: NURSE PRACTITIONER

## 2024-04-16 PROCEDURE — 99214 OFFICE O/P EST MOD 30 MIN: CPT | Performed by: NURSE PRACTITIONER

## 2024-04-16 PROCEDURE — 3074F SYST BP LT 130 MM HG: CPT | Performed by: NURSE PRACTITIONER

## 2024-04-16 PROCEDURE — 3078F DIAST BP <80 MM HG: CPT | Performed by: NURSE PRACTITIONER

## 2024-04-16 RX ORDER — PROPRANOLOL HYDROCHLORIDE 10 MG/1
10 TABLET ORAL 2 TIMES DAILY
Qty: 60 TABLET | Refills: 0 | Status: SHIPPED | OUTPATIENT
Start: 2024-04-16

## 2024-04-16 NOTE — PROGRESS NOTES
Subjective   Ekaterina Nelson is a 49 y.o. female.     Chief Complaint   Patient presents with    Anxiety     Answers submitted by the patient for this visit:  Other (Submitted on 4/15/2024)  Please describe your symptoms.: Follow up  Have you had these symptoms before?: Yes  How long have you been having these symptoms?: Greater than 2 weeks  Primary Reason for Visit (Submitted on 4/15/2024)  What is the primary reason for your visit?: Other      History of Present Illness     Anxiety-chronic and ongoing.  Patient is currently ordered Wellbutrin 300 mg and Abilify 5 mg.  She is also on Ativan 1 mg daily as needed.  No negative side effects other than drowsiness.  She reports that symptom has begun since the increase in Abilify to 5 mg.    Hyperinsulinemia/prediabetes-patient is ordered Ozempic.  She was off of her Ozempic for several weeks for her hysterectomy.  She has resumed medication and is at 2 mg.  She reports that she has noted that she generally feels better on the medication.   She has more fatigue, fluid retention, and widely flucuating blood sugars.    S/P hysterectomy-ongoing.  Reports that she is recovering well.   Was given premarin cream to use due to some pain.  Everything is healing well.  BM's are becoming more regular.  She has only needs stool medication once since her surgery.  No urine leakage.  She does have some pain with intercourse but will follow up with GYN again for resolution.    Tremors-has history of family members with tremors.  She has been seen by Neuro in the past.  Was diagnosed with essential tremors.  She feels that her hand symptoms are progressive and she has had some difficulty putting on her make up with either hand.  She reports that she has noted some newer symptoms in her right leg.  She reports that when she pushes the gas pedal her leg starts jerking.  She reports that she was on Topamax in the past but she had negative side effects of pins and needles.  She reports that  "she is occasionally noted some tremors in her head.    Hypothyroidism-ongoing.  Patient is currently taking Synthroid 50 mcg.  No negative side effects.  No negative side effects of medication.  Patient denies any hair or skin changes.  No reports of heat or cold intolerance.    The following portions of the patient's history were reviewed and updated as appropriate: CC, ROS, allergies, current medications, past family history, past medical history, past social history, past surgical history and problem list.    Review of Systems   Constitutional:  Positive for fatigue. Negative for appetite change and fever.   HENT:  Negative for congestion, ear pain, nosebleeds, postnasal drip, rhinorrhea, sore throat, tinnitus, trouble swallowing and voice change.    Eyes:  Negative for blurred vision, photophobia and visual disturbance.   Respiratory:  Negative for cough, chest tightness, shortness of breath and wheezing.    Cardiovascular:  Negative for chest pain and palpitations.   Gastrointestinal:  Negative for abdominal pain, blood in stool, constipation, diarrhea, nausea and GERD.   Endocrine: Negative for cold intolerance, heat intolerance and polydipsia.   Genitourinary:  Negative for decreased urine volume, difficulty urinating, dysuria and hematuria.   Musculoskeletal:  Negative for arthralgias, back pain, gait problem and myalgias.   Skin:  Negative for color change, pallor and wound.   Allergic/Immunologic: Negative.    Neurological:  Negative for dizziness, syncope, numbness and headache.   Hematological: Negative.    Psychiatric/Behavioral:  Negative for decreased concentration, sleep disturbance, suicidal ideas and depressed mood. The patient is not nervous/anxious.    All other systems reviewed and are negative.      Objective     /72   Pulse 84   Resp 16   Ht 154.9 cm (60.98\")   Wt 59.9 kg (132 lb)   SpO2 99%   BMI 24.95 kg/m²     Physical Exam  Vitals reviewed.   Constitutional:       General: She " is not in acute distress.     Appearance: She is well-developed. She is not diaphoretic.   HENT:      Head: Normocephalic and atraumatic.      Jaw: No tenderness.      Right Ear: Hearing, tympanic membrane, ear canal and external ear normal.      Left Ear: Hearing, tympanic membrane, ear canal and external ear normal.      Nose: Nose normal. No nasal tenderness or congestion.      Right Sinus: No maxillary sinus tenderness or frontal sinus tenderness.      Left Sinus: No maxillary sinus tenderness or frontal sinus tenderness.      Mouth/Throat:      Lips: Pink.      Mouth: Mucous membranes are moist.      Pharynx: Oropharynx is clear. Uvula midline.   Eyes:      General: Lids are normal. No scleral icterus.     Extraocular Movements:      Right eye: Normal extraocular motion and no nystagmus.      Left eye: Normal extraocular motion and no nystagmus.      Conjunctiva/sclera: Conjunctivae normal.      Pupils: Pupils are equal, round, and reactive to light.   Neck:      Thyroid: No thyromegaly or thyroid tenderness.      Vascular: No carotid bruit or JVD.      Trachea: No tracheal tenderness.   Cardiovascular:      Rate and Rhythm: Normal rate and regular rhythm.      Pulses:           Dorsalis pedis pulses are 2+ on the right side and 2+ on the left side.        Posterior tibial pulses are 2+ on the right side and 2+ on the left side.      Heart sounds: Normal heart sounds, S1 normal and S2 normal. No murmur heard.  Pulmonary:      Effort: Pulmonary effort is normal. No accessory muscle usage, prolonged expiration or respiratory distress.      Breath sounds: Normal breath sounds.   Chest:      Chest wall: No tenderness.   Abdominal:      General: Bowel sounds are normal. There is no distension.      Palpations: Abdomen is soft. There is no hepatomegaly, splenomegaly or mass.      Tenderness: There is no abdominal tenderness.   Musculoskeletal:         General: Tenderness present.      Cervical back: Normal range of  motion and neck supple.      Right lower leg: No edema.      Left lower leg: No edema.      Comments: No muscular atrophy or flaccidity.  Multiple areas of generalized arthralgia and myalgia noted throughout spine, hips, knees, and shoulders.     Lymphadenopathy:      Head:      Right side of head: No submental or submandibular adenopathy.      Left side of head: No submental or submandibular adenopathy.      Cervical: No cervical adenopathy.      Right cervical: No superficial cervical adenopathy.     Left cervical: No superficial cervical adenopathy.   Skin:     General: Skin is warm and dry.      Capillary Refill: Capillary refill takes less than 2 seconds.      Coloration: Skin is not jaundiced or pale.      Findings: No erythema.      Nails: There is no clubbing.   Neurological:      Mental Status: She is alert and oriented to person, place, and time.      Cranial Nerves: No cranial nerve deficit or facial asymmetry.      Sensory: No sensory deficit.      Motor: Tremor (generalized in extremities) present. No weakness, atrophy or abnormal muscle tone.      Coordination: Coordination normal.      Deep Tendon Reflexes: Reflexes are normal and symmetric.   Psychiatric:         Attention and Perception: She is attentive.         Mood and Affect: Mood normal. Mood is not anxious or depressed.         Speech: Speech normal.         Behavior: Behavior normal. Behavior is cooperative.         Thought Content: Thought content normal.         Cognition and Memory: Cognition normal.         Judgment: Judgment normal.         Diagnoses and all orders for this visit:    1. Essential tremor (Primary)  Comments:  Trial of propranolol.  Patient will report efficacy.  Will titrate as needed  Orders:  -     propranolol (INDERAL) 10 MG tablet; Take 1 tablet by mouth 2 (Two) Times a Day.  Dispense: 60 tablet; Refill: 0    2. Anxiety disorder due to general medical condition with panic attack  Assessment & Plan:  Continue PRN  Ativan and Wellbutrin 300 mg daily.    Continue abilify as directed by mental health provider  Stress reduction advised (examples:  make time for self, Reading, Listening to music, Exercise, keeping a journal, venting to a confidant, etc.)      3. Thyroiditis  Comments:  continue Synthoid 50 mcg. report any changes in skin, hair, or temp intolerances    4. Encounter for screening mammogram for malignant neoplasm of breast  -     Mammo Screening Digital Tomosynthesis Bilateral With CAD    5. Long-term use of high-risk medication  -     Urine Drug Screen - Urine, Clean Catch; Future  -     Urine Drug Screen - Urine, Clean Catch       BMI is within normal parameters. No other follow-up for BMI required.      Understands disease processes and need for medications.  Understands reasons for urgent and emergent care.  Patient (& family) verbalized agreement for treatment plan.   Emotional support and active listening provided.  Patient provided time to verbalize feelings.    EILEEN reviewed today and consistent.  Will refill prescribed controlled medication today.  Patient is aware they cannot receive narcotics from any other provider except if under care of pain management or speciality clinic.  Risk and benefits of medication use has been reviewed.  History and physical exam exhibit continued safe and appropriate use of controlled substances.  The patient is aware of the potential for addiction and dependence.  This patient has been made aware of the appropriate use of such medications, including potential risk of somnolence, limited ability to drive and / or work safely, and potential for overdose.  Patient understands not to take medication and drive until they know how medication may affect their cognition/decision making.   It has also been made clear that these medications are for use by this patient only, without concomitant use of alcohol or other substances unless prescribed/advised by medical provider.  Patient  understands they may be subject to UDS and pill counts at random.    Patient considered to be low risk for addiction due to use of single controlled medications.  Patient understands and accepts these risks.  Patient need for medication will be reassessed at each visit.  Doses will be adjusted according to patient need and findings.    Goal of TX: Patient will not have any adverse reactions of medication.  Patient have reduction in anxiety symptoms with use of PRN Ativan as directed.  Patient will be able to remain active in an outside of home with use of Ativan for anxiety symptoms    EILEEN Patient Controlled Substance Report (from 4/17/2023 to 4/16/2024)    Dispensed  Strength Quantity Days Supply Provider Pharmacy   04/05/2024 Lorazepam 1MG 30 each 30 [facility]   03/07/2024 Lorazepam 1MG 30 each 30 [facility]   03/05/2024 Hydrocodone Bitartrate/Ac 325MG/5MG 30 each 10 [facility]   01/13/2024 Lorazepam 1MG 30 each 30 [facility]        RTC 1 month, sooner if needed.             This document has been electronically signed by:  ILEANA Vega, FNP-C    Dragon disclaimer:  Part of this note may be an electronic transcription/translation of spoken language to printed text using the Dragon Dictation System.

## 2024-04-25 NOTE — ASSESSMENT & PLAN NOTE
Continue PRN Ativan and Wellbutrin 300 mg daily.    Continue abilify as directed by mental health provider  Stress reduction advised (examples:  make time for self, Reading, Listening to music, Exercise, keeping a journal, venting to a confidant, etc.)

## 2024-04-30 ENCOUNTER — HOSPITAL ENCOUNTER (OUTPATIENT)
Facility: HOSPITAL | Age: 49
Discharge: HOME | End: 2024-04-30
Admitting: NURSE PRACTITIONER
Payer: MEDICAID

## 2024-04-30 PROCEDURE — 77063 BREAST TOMOSYNTHESIS BI: CPT

## 2024-04-30 PROCEDURE — 77067 SCR MAMMO BI INCL CAD: CPT | Performed by: RADIOLOGY

## 2024-04-30 PROCEDURE — 77067 SCR MAMMO BI INCL CAD: CPT

## 2024-04-30 PROCEDURE — 77063 BREAST TOMOSYNTHESIS BI: CPT | Performed by: RADIOLOGY

## 2024-05-06 DIAGNOSIS — E06.9 THYROIDITIS: ICD-10-CM

## 2024-05-06 RX ORDER — CLOBETASOL PROPIONATE 0.05 G/100ML
SHAMPOO TOPICAL
Qty: 118 ML | Refills: 5 | Status: SHIPPED | OUTPATIENT
Start: 2024-05-06

## 2024-05-06 RX ORDER — LEVOTHYROXINE SODIUM 0.05 MG/1
50 TABLET ORAL DAILY
Qty: 30 TABLET | Refills: 5 | Status: SHIPPED | OUTPATIENT
Start: 2024-05-06

## 2024-05-12 RX ORDER — SEMAGLUTIDE 2.68 MG/ML
INJECTION, SOLUTION SUBCUTANEOUS
Qty: 3 ML | Refills: 1 | Status: SHIPPED | OUTPATIENT
Start: 2024-05-12

## 2024-05-13 DIAGNOSIS — F41.0 ANXIETY DISORDER DUE TO GENERAL MEDICAL CONDITION WITH PANIC ATTACK: ICD-10-CM

## 2024-05-13 DIAGNOSIS — F06.4 ANXIETY DISORDER DUE TO GENERAL MEDICAL CONDITION WITH PANIC ATTACK: ICD-10-CM

## 2024-05-13 RX ORDER — ARIPIPRAZOLE 2 MG/1
2 TABLET ORAL DAILY
Qty: 30 TABLET | Refills: 0 | Status: SHIPPED | OUTPATIENT
Start: 2024-05-13

## 2024-05-13 NOTE — TELEPHONE ENCOUNTER
At last visit with patient we increased to 5mg. Please find out if she needs refills on the 5mg. Thank you

## 2024-05-16 DIAGNOSIS — F33.1 MODERATE EPISODE OF RECURRENT MAJOR DEPRESSIVE DISORDER: ICD-10-CM

## 2024-05-16 DIAGNOSIS — G25.0 ESSENTIAL TREMOR: ICD-10-CM

## 2024-05-16 DIAGNOSIS — I10 ESSENTIAL HYPERTENSION: Chronic | ICD-10-CM

## 2024-05-16 RX ORDER — ARIPIPRAZOLE 5 MG/1
5 TABLET ORAL DAILY
Qty: 30 TABLET | Refills: 0 | OUTPATIENT
Start: 2024-05-16

## 2024-05-16 RX ORDER — PROPRANOLOL HYDROCHLORIDE 10 MG/1
10 TABLET ORAL 2 TIMES DAILY
Qty: 60 TABLET | Refills: 5 | Status: SHIPPED | OUTPATIENT
Start: 2024-05-16

## 2024-05-16 RX ORDER — FUROSEMIDE 20 MG/1
20 TABLET ORAL DAILY
Qty: 90 TABLET | Refills: 1 | Status: SHIPPED | OUTPATIENT
Start: 2024-05-16

## 2024-05-20 DIAGNOSIS — R23.2 HOT FLASHES: ICD-10-CM

## 2024-05-20 RX ORDER — ESTRADIOL 0.1 MG/D
FILM, EXTENDED RELEASE TRANSDERMAL
Qty: 8 PATCH | Refills: 2 | Status: SHIPPED | OUTPATIENT
Start: 2024-05-20

## 2024-05-21 RX ORDER — POLYETHYLENE GLYCOL 3350 17 G/17G
POWDER, FOR SOLUTION ORAL
Qty: 510 G | Refills: 5 | Status: SHIPPED | OUTPATIENT
Start: 2024-05-21

## 2024-05-29 ENCOUNTER — OFFICE VISIT (OUTPATIENT)
Dept: FAMILY MEDICINE CLINIC | Facility: CLINIC | Age: 49
End: 2024-05-29
Payer: MEDICAID

## 2024-05-29 VITALS
RESPIRATION RATE: 14 BRPM | HEIGHT: 61 IN | DIASTOLIC BLOOD PRESSURE: 74 MMHG | HEART RATE: 66 BPM | WEIGHT: 133 LBS | BODY MASS INDEX: 25.11 KG/M2 | OXYGEN SATURATION: 100 % | SYSTOLIC BLOOD PRESSURE: 110 MMHG

## 2024-05-29 DIAGNOSIS — F41.0 ANXIETY DISORDER DUE TO GENERAL MEDICAL CONDITION WITH PANIC ATTACK: ICD-10-CM

## 2024-05-29 DIAGNOSIS — R30.0 DYSURIA: Primary | ICD-10-CM

## 2024-05-29 DIAGNOSIS — R23.2 HOT FLASHES: ICD-10-CM

## 2024-05-29 DIAGNOSIS — F06.4 ANXIETY DISORDER DUE TO GENERAL MEDICAL CONDITION WITH PANIC ATTACK: ICD-10-CM

## 2024-05-29 DIAGNOSIS — E28.2 PCOS (POLYCYSTIC OVARIAN SYNDROME): ICD-10-CM

## 2024-05-29 LAB
BILIRUB BLD-MCNC: ABNORMAL MG/DL
CLARITY, POC: ABNORMAL
COLOR UR: YELLOW
GLUCOSE UR STRIP-MCNC: NEGATIVE MG/DL
KETONES UR QL: NEGATIVE
LEUKOCYTE EST, POC: ABNORMAL
NITRITE UR-MCNC: POSITIVE MG/ML
PH UR: 6 [PH] (ref 5–8)
PROT UR STRIP-MCNC: ABNORMAL MG/DL
RBC # UR STRIP: ABNORMAL /UL
SP GR UR: 1.03 (ref 1–1.03)
UROBILINOGEN UR QL: NORMAL

## 2024-05-29 PROCEDURE — 87086 URINE CULTURE/COLONY COUNT: CPT | Performed by: NURSE PRACTITIONER

## 2024-05-29 PROCEDURE — 99214 OFFICE O/P EST MOD 30 MIN: CPT | Performed by: NURSE PRACTITIONER

## 2024-05-29 PROCEDURE — 1126F AMNT PAIN NOTED NONE PRSNT: CPT | Performed by: NURSE PRACTITIONER

## 2024-05-29 PROCEDURE — 87077 CULTURE AEROBIC IDENTIFY: CPT | Performed by: NURSE PRACTITIONER

## 2024-05-29 PROCEDURE — 87186 SC STD MICRODIL/AGAR DIL: CPT | Performed by: NURSE PRACTITIONER

## 2024-05-29 PROCEDURE — 1160F RVW MEDS BY RX/DR IN RCRD: CPT | Performed by: NURSE PRACTITIONER

## 2024-05-29 PROCEDURE — 3078F DIAST BP <80 MM HG: CPT | Performed by: NURSE PRACTITIONER

## 2024-05-29 PROCEDURE — 1159F MED LIST DOCD IN RCRD: CPT | Performed by: NURSE PRACTITIONER

## 2024-05-29 PROCEDURE — 3074F SYST BP LT 130 MM HG: CPT | Performed by: NURSE PRACTITIONER

## 2024-05-29 RX ORDER — SULFAMETHOXAZOLE AND TRIMETHOPRIM 800; 160 MG/1; MG/1
1 TABLET ORAL EVERY 12 HOURS
Qty: 20 TABLET | Refills: 0 | Status: SHIPPED | OUTPATIENT
Start: 2024-05-29 | End: 2024-06-08

## 2024-05-29 RX ORDER — SPIRONOLACTONE 50 MG/1
50 TABLET, FILM COATED ORAL DAILY
Qty: 30 TABLET | Refills: 5 | Status: SHIPPED | OUTPATIENT
Start: 2024-05-29

## 2024-05-29 RX ORDER — LORAZEPAM 1 MG/1
1 TABLET ORAL DAILY PRN
Qty: 30 TABLET | Refills: 0 | Status: SHIPPED | OUTPATIENT
Start: 2024-05-29

## 2024-05-29 RX ORDER — ESTRADIOL 0.1 MG/D
1 FILM, EXTENDED RELEASE TRANSDERMAL 2 TIMES WEEKLY
Qty: 8 PATCH | Refills: 2 | Status: SHIPPED | OUTPATIENT
Start: 2024-05-30

## 2024-05-29 RX ORDER — PROGESTERONE 100 MG/1
100 CAPSULE ORAL 3 TIMES WEEKLY
Qty: 12 CAPSULE | Refills: 0 | Status: SHIPPED | OUTPATIENT
Start: 2024-05-29

## 2024-05-29 NOTE — PROGRESS NOTES
Subjective   Ekaterina Nelson is a 49 y.o. female.     Chief Complaint   Patient presents with    tremors.       History of Present Illness     Tremors-on propranolol 10 mg.  She is tolerating well.   She reports that she is able to see improvement in her tremors.    Dysuria-ongoing.  For at least a week.  She has noted pressure as well as foul smell.  She reports at the end of her stream the pressure occurs.  She reports that she is not drinking well.  She feels that she is emptying without difficulty.  She has not had any fever.    Menopause symptoms-she has been more tired and gambino.  She is not sleeping well.  She reports some increased dryness.  She is back on her hormone patch.  She does feel that it is sticking well.  She has decreased libido.  She has noted some increased hirsutism despite spironolactone use.   Back pain-has noted some increase in back pain.  She feels that her spine is crooked. She had a fall in the past and she fell down on her buttocks.  She feels that her tailbone is pressed forward.  She does not have any imaging.   Depression-stopped abilify due to feeling very drowsy and fatigued.  She is on Wellbutrin and tolerating well.  She is under the care of mental health.        The following portions of the patient's history were reviewed and updated as appropriate: CC, ROS, allergies, current medications, past family history, past medical history, past social history, past surgical history and problem list.    Review of Systems   Constitutional:  Positive for fatigue. Negative for appetite change and fever.   HENT:  Negative for congestion, ear pain, nosebleeds, postnasal drip, rhinorrhea, sore throat, tinnitus, trouble swallowing and voice change.    Eyes:  Negative for blurred vision, photophobia and visual disturbance.   Respiratory:  Negative for cough, chest tightness, shortness of breath and wheezing.    Cardiovascular:  Negative for chest pain and palpitations.   Gastrointestinal:   "Negative for abdominal pain, blood in stool, constipation, diarrhea, nausea and GERD.   Endocrine: Negative for cold intolerance, heat intolerance and polydipsia.        Increase in hot flashes   Genitourinary:  Positive for decreased libido, dyspareunia and dysuria. Negative for decreased urine volume, difficulty urinating and hematuria.   Musculoskeletal:  Negative for arthralgias, back pain, gait problem and myalgias.   Skin:  Negative for color change, pallor and wound.   Allergic/Immunologic: Negative.    Neurological:  Positive for dizziness. Negative for syncope, numbness and headache.   Hematological: Negative.    Psychiatric/Behavioral:  Positive for sleep disturbance and stress. Negative for decreased concentration, suicidal ideas and depressed mood. The patient is not nervous/anxious.    All other systems reviewed and are negative.      Objective     /74   Pulse 66   Resp 14   Ht 154.9 cm (60.98\")   Wt 60.3 kg (133 lb)   LMP 02/20/2019   SpO2 100%   BMI 25.14 kg/m²     Physical Exam  Vitals reviewed.   Constitutional:       General: She is not in acute distress.     Appearance: She is well-developed. She is not diaphoretic.   HENT:      Head: Normocephalic and atraumatic.      Jaw: No tenderness.      Right Ear: Hearing, tympanic membrane, ear canal and external ear normal.      Left Ear: Hearing, tympanic membrane, ear canal and external ear normal.      Nose: Nose normal. No nasal tenderness or congestion.      Right Sinus: No maxillary sinus tenderness or frontal sinus tenderness.      Left Sinus: No maxillary sinus tenderness or frontal sinus tenderness.      Mouth/Throat:      Lips: Pink.      Mouth: Mucous membranes are moist.      Pharynx: Oropharynx is clear. Uvula midline.   Eyes:      General: Lids are normal. No scleral icterus.     Extraocular Movements:      Right eye: Normal extraocular motion and no nystagmus.      Left eye: Normal extraocular motion and no nystagmus.      " Conjunctiva/sclera: Conjunctivae normal.      Pupils: Pupils are equal, round, and reactive to light.   Neck:      Thyroid: No thyromegaly or thyroid tenderness.      Vascular: No carotid bruit or JVD.      Trachea: No tracheal tenderness.   Cardiovascular:      Rate and Rhythm: Normal rate and regular rhythm.      Pulses:           Dorsalis pedis pulses are 2+ on the right side and 2+ on the left side.        Posterior tibial pulses are 2+ on the right side and 2+ on the left side.      Heart sounds: Normal heart sounds, S1 normal and S2 normal. No murmur heard.  Pulmonary:      Effort: Pulmonary effort is normal. No accessory muscle usage, prolonged expiration or respiratory distress.      Breath sounds: Normal breath sounds.   Chest:      Chest wall: No tenderness.   Abdominal:      General: Bowel sounds are normal. There is no distension.      Palpations: Abdomen is soft. There is no hepatomegaly, splenomegaly or mass.      Tenderness: There is no abdominal tenderness.   Musculoskeletal:         General: Tenderness present.      Cervical back: Normal range of motion and neck supple. Tenderness present.      Thoracic back: Tenderness and bony tenderness present.      Lumbar back: Bony tenderness present.      Right lower leg: No edema.      Left lower leg: No edema.      Comments: No muscular atrophy or flaccidity.  Multiple areas of generalized arthralgia and myalgia noted throughout spine, hips, knees, and shoulders.  Tenderness with palpation over sacral region     Lymphadenopathy:      Head:      Right side of head: No submental or submandibular adenopathy.      Left side of head: No submental or submandibular adenopathy.      Cervical: No cervical adenopathy.      Right cervical: No superficial cervical adenopathy.     Left cervical: No superficial cervical adenopathy.   Skin:     General: Skin is warm and dry.      Capillary Refill: Capillary refill takes less than 2 seconds.      Coloration: Skin is not  jaundiced or pale.      Findings: No erythema.      Nails: There is no clubbing.   Neurological:      Mental Status: She is alert and oriented to person, place, and time.      Cranial Nerves: No cranial nerve deficit or facial asymmetry.      Sensory: No sensory deficit.      Motor: Tremor (generalized in extremities) present. No weakness, atrophy or abnormal muscle tone.      Coordination: Coordination normal.      Deep Tendon Reflexes: Reflexes are normal and symmetric.   Psychiatric:         Attention and Perception: She is attentive.         Mood and Affect: Mood is depressed. Mood is not anxious.         Speech: Speech normal.         Behavior: Behavior normal. Behavior is cooperative.         Thought Content: Thought content normal.         Cognition and Memory: Cognition normal.         Judgment: Judgment normal.         Diagnoses and all orders for this visit:    1. Dysuria (Primary)  -     POCT urinalysis dipstick, manual  -     Urine Culture - Urine, Urine, Clean Catch  -     sulfamethoxazole-trimethoprim (BACTRIM DS,SEPTRA DS) 800-160 MG per tablet; Take 1 tablet by mouth Every 12 (Twelve) Hours for 10 days.  Dispense: 20 tablet; Refill: 0    2. PCOS (polycystic ovarian syndrome)  Comments:  increase in spironolactone to 50 mg.  monitor BP  Orders:  -     spironolactone (Aldactone) 50 MG tablet; Take 1 tablet by mouth Daily.  Dispense: 30 tablet; Refill: 5    3. Hot flashes  Comments:  increase in minivelle patch  Orders:  -     estradiol (VIVELLE-DOT) 0.1 MG/24HR patch; Place 1 patch on the skin as directed by provider 2 (Two) Times a Week.  Dispense: 8 patch; Refill: 2  -     Progesterone (Prometrium) 100 MG capsule; Take 1 capsule by mouth 3 (Three) Times a Week.  Dispense: 12 capsule; Refill: 0    4. Anxiety disorder due to general medical condition with panic attack  Comments:  Continue Wellbutrin and as needed Ativan.  We will add Abilify 2 mg to her medication regimen.  Encourage patient to be  active as she is able  Orders:  -     LORazepam (ATIVAN) 1 MG tablet; Take 1 tablet by mouth Daily As Needed for Anxiety.  Dispense: 30 tablet; Refill: 0       Understands disease processes and need for medications.  Understands reasons for urgent and emergent care.  Patient (& family) verbalized agreement for treatment plan.   Emotional support and active listening provided.  Patient provided time to verbalize feelings.    EILEEN/PMDP reviewed today and consistent.  Will refill prescribed controlled medication today.  Patient is aware they cannot receive narcotics from any other provider except if under care of pain management or speciality clinic.  Risk and benefits of medication use has been reviewed.  History and physical exam exhibit continued safe and appropriate use of controlled substances.  The patient is aware of the potential for addiction and dependence.  This patient has been made aware of the appropriate use of such medications, including potential risk of somnolence, limited ability to drive and / or work safely, and potential for overdose.    It has also been made clear that these medications are for use by this patient only, without concomitant use of alcohol or other substances unless prescribed/advised by medical provider.  Patient understands they may be subject to UDS and pill counts at random.    Patient considered to be low risk for addiction due to use of single controlled medications.  Patient understands and accepts these risks.  Patient need for medication will be reassessed at each visit.  Doses will be adjusted according to patient need and findings.    Goal of TX: Patient will not have any adverse reactions of medication.    Patient have reduction in anxiety symptoms with use of PRN Ativan as directed.  Patient will be able to remain active in an outside of home with use of Ativan for anxiety symptoms    EILEEN Patient Controlled Substance Report (from 5/30/2023 to  5/29/2024)    Dispensed  Strength Quantity Days Supply Provider Pharmacy   05/13/2024 Lorazepam 1MG 30 each 30 [facility]ILL[facility]   04/05/2024 Lorazepam 1MG 30 each 30 [facility]   03/07/2024 Lorazepam 1MG 30 each 30 [facility]   03/05/2024 Hydrocodone Bitartrate/Ac 325MG/5MG 30 each [facility]        RTC 2 months, sooner if needed.             This document has been electronically signed by:  ILEANA Vega FNP-C Dragon disclaimer:  Part of this note may be an electronic transcription/translation of spoken language to printed text using the Dragon Dictation System.

## 2024-05-31 LAB — BACTERIA SPEC AEROBE CULT: ABNORMAL

## 2024-06-05 DIAGNOSIS — K59.01 SLOW TRANSIT CONSTIPATION: ICD-10-CM

## 2024-06-05 RX ORDER — DOCUSATE SODIUM 100 MG/1
200 CAPSULE, LIQUID FILLED ORAL 2 TIMES DAILY
Qty: 120 CAPSULE | Refills: 2 | Status: SHIPPED | OUTPATIENT
Start: 2024-06-05

## 2024-06-10 DIAGNOSIS — E78.1 PURE HYPERTRIGLYCERIDEMIA: ICD-10-CM

## 2024-06-10 RX ORDER — ROSUVASTATIN CALCIUM 20 MG/1
TABLET, COATED ORAL
Qty: 90 TABLET | Refills: 1 | Status: SHIPPED | OUTPATIENT
Start: 2024-06-10

## 2024-06-17 DIAGNOSIS — F06.4 ANXIETY DISORDER DUE TO GENERAL MEDICAL CONDITION WITH PANIC ATTACK: ICD-10-CM

## 2024-06-17 DIAGNOSIS — F41.0 ANXIETY DISORDER DUE TO GENERAL MEDICAL CONDITION WITH PANIC ATTACK: ICD-10-CM

## 2024-06-17 DIAGNOSIS — R30.0 DYSURIA: ICD-10-CM

## 2024-06-18 DIAGNOSIS — R23.2 HOT FLASHES: ICD-10-CM

## 2024-06-18 RX ORDER — LORAZEPAM 1 MG/1
1 TABLET ORAL DAILY
Qty: 30 TABLET | Refills: 0 | Status: SHIPPED | OUTPATIENT
Start: 2024-06-18 | End: 2024-06-24 | Stop reason: SDUPTHER

## 2024-06-18 RX ORDER — SULFAMETHOXAZOLE AND TRIMETHOPRIM 800; 160 MG/1; MG/1
1 TABLET ORAL EVERY 12 HOURS
Qty: 20 TABLET | Refills: 0 | Status: SHIPPED | OUTPATIENT
Start: 2024-06-18

## 2024-06-18 RX ORDER — PROGESTERONE 100 MG/1
100 CAPSULE ORAL 3 TIMES WEEKLY
Qty: 12 CAPSULE | Refills: 0 | Status: SHIPPED | OUTPATIENT
Start: 2024-06-19

## 2024-06-24 DIAGNOSIS — F06.4 ANXIETY DISORDER DUE TO GENERAL MEDICAL CONDITION WITH PANIC ATTACK: ICD-10-CM

## 2024-06-24 DIAGNOSIS — K59.04 CHRONIC IDIOPATHIC CONSTIPATION: ICD-10-CM

## 2024-06-24 DIAGNOSIS — F41.0 ANXIETY DISORDER DUE TO GENERAL MEDICAL CONDITION WITH PANIC ATTACK: ICD-10-CM

## 2024-06-24 RX ORDER — LORAZEPAM 1 MG/1
1 TABLET ORAL DAILY
Qty: 30 TABLET | Refills: 0 | Status: SHIPPED | OUTPATIENT
Start: 2024-06-24

## 2024-06-25 RX ORDER — LINACLOTIDE 72 UG/1
72 CAPSULE, GELATIN COATED ORAL
Qty: 30 CAPSULE | Refills: 5 | Status: SHIPPED | OUTPATIENT
Start: 2024-06-25

## 2024-07-07 RX ORDER — OMEPRAZOLE 40 MG/1
40 CAPSULE, DELAYED RELEASE ORAL DAILY
Qty: 90 CAPSULE | Refills: 1 | Status: SHIPPED | OUTPATIENT
Start: 2024-07-07

## 2024-07-22 DIAGNOSIS — F06.4 ANXIETY DISORDER DUE TO GENERAL MEDICAL CONDITION WITH PANIC ATTACK: ICD-10-CM

## 2024-07-22 DIAGNOSIS — R23.2 HOT FLASHES: ICD-10-CM

## 2024-07-22 DIAGNOSIS — F41.0 ANXIETY DISORDER DUE TO GENERAL MEDICAL CONDITION WITH PANIC ATTACK: ICD-10-CM

## 2024-07-22 RX ORDER — PROGESTERONE 100 MG/1
CAPSULE ORAL
Qty: 12 CAPSULE | Refills: 0 | Status: SHIPPED | OUTPATIENT
Start: 2024-07-22

## 2024-07-22 RX ORDER — LORAZEPAM 1 MG/1
1 TABLET ORAL DAILY PRN
Qty: 30 TABLET | Refills: 0 | Status: SHIPPED | OUTPATIENT
Start: 2024-07-22

## 2024-08-06 RX ORDER — SEMAGLUTIDE 2.68 MG/ML
INJECTION, SOLUTION SUBCUTANEOUS
Qty: 3 ML | Refills: 1 | Status: SHIPPED | OUTPATIENT
Start: 2024-08-06

## 2024-08-26 DIAGNOSIS — F06.4 ANXIETY DISORDER DUE TO GENERAL MEDICAL CONDITION WITH PANIC ATTACK: ICD-10-CM

## 2024-08-26 DIAGNOSIS — F41.0 ANXIETY DISORDER DUE TO GENERAL MEDICAL CONDITION WITH PANIC ATTACK: ICD-10-CM

## 2024-08-26 DIAGNOSIS — R23.2 HOT FLASHES: ICD-10-CM

## 2024-08-26 DIAGNOSIS — I10 ESSENTIAL HYPERTENSION: Chronic | ICD-10-CM

## 2024-08-26 RX ORDER — FUROSEMIDE 20 MG
20 TABLET ORAL DAILY
Qty: 90 TABLET | Refills: 1 | Status: SHIPPED | OUTPATIENT
Start: 2024-08-26

## 2024-08-26 RX ORDER — PROGESTERONE 100 MG/1
CAPSULE ORAL
Qty: 12 CAPSULE | Refills: 1 | Status: SHIPPED | OUTPATIENT
Start: 2024-08-26

## 2024-08-26 RX ORDER — LORAZEPAM 1 MG/1
1 TABLET ORAL DAILY PRN
Qty: 30 TABLET | Refills: 1 | Status: SHIPPED | OUTPATIENT
Start: 2024-08-26 | End: 2024-08-27 | Stop reason: SDUPTHER

## 2024-08-27 ENCOUNTER — OFFICE VISIT (OUTPATIENT)
Dept: FAMILY MEDICINE CLINIC | Facility: CLINIC | Age: 49
End: 2024-08-27
Payer: MEDICAID

## 2024-08-27 VITALS
HEIGHT: 61 IN | DIASTOLIC BLOOD PRESSURE: 78 MMHG | SYSTOLIC BLOOD PRESSURE: 124 MMHG | HEART RATE: 65 BPM | BODY MASS INDEX: 24.47 KG/M2 | WEIGHT: 129.6 LBS | RESPIRATION RATE: 12 BRPM | OXYGEN SATURATION: 98 %

## 2024-08-27 DIAGNOSIS — I10 ESSENTIAL HYPERTENSION: Primary | Chronic | ICD-10-CM

## 2024-08-27 DIAGNOSIS — E89.40 PREMATURE SURGICAL MENOPAUSE ON HRT: ICD-10-CM

## 2024-08-27 DIAGNOSIS — R73.09 ABNORMAL GLUCOSE: ICD-10-CM

## 2024-08-27 DIAGNOSIS — F06.4 ANXIETY DISORDER DUE TO GENERAL MEDICAL CONDITION WITH PANIC ATTACK: ICD-10-CM

## 2024-08-27 DIAGNOSIS — F41.0 ANXIETY DISORDER DUE TO GENERAL MEDICAL CONDITION WITH PANIC ATTACK: ICD-10-CM

## 2024-08-27 DIAGNOSIS — Z79.890 PREMATURE SURGICAL MENOPAUSE ON HRT: ICD-10-CM

## 2024-08-27 DIAGNOSIS — G25.0 ESSENTIAL TREMOR: ICD-10-CM

## 2024-08-27 PROCEDURE — 3078F DIAST BP <80 MM HG: CPT | Performed by: NURSE PRACTITIONER

## 2024-08-27 PROCEDURE — 99214 OFFICE O/P EST MOD 30 MIN: CPT | Performed by: NURSE PRACTITIONER

## 2024-08-27 PROCEDURE — 1160F RVW MEDS BY RX/DR IN RCRD: CPT | Performed by: NURSE PRACTITIONER

## 2024-08-27 PROCEDURE — 1126F AMNT PAIN NOTED NONE PRSNT: CPT | Performed by: NURSE PRACTITIONER

## 2024-08-27 PROCEDURE — 3074F SYST BP LT 130 MM HG: CPT | Performed by: NURSE PRACTITIONER

## 2024-08-27 PROCEDURE — 1159F MED LIST DOCD IN RCRD: CPT | Performed by: NURSE PRACTITIONER

## 2024-08-27 RX ORDER — LORAZEPAM 1 MG/1
1 TABLET ORAL DAILY PRN
Qty: 30 TABLET | Refills: 1 | Status: SHIPPED | OUTPATIENT
Start: 2024-08-27

## 2024-08-27 RX ORDER — PROPRANOLOL HYDROCHLORIDE 10 MG/1
10 TABLET ORAL 3 TIMES DAILY
Qty: 90 TABLET | Refills: 2 | Status: SHIPPED | OUTPATIENT
Start: 2024-08-27

## 2024-08-27 NOTE — PROGRESS NOTES
"Subjective   Ekaterina Nelson is a 49 y.o. female.     Chief Complaint   Patient presents with    Tremors       History of Present Illness     Tremors-ongoing.  Patient is currently on propranolol 10 mg twice daily.  Her blood pressure has remained stable.  She reports that she has had some occasional sensation of being light headed but \"not often.\"   Hypothyroidism-chronic and ongoing.  Patient is currently on Synthroid 50 mcg.  No negative side effects.  No negative side effects of medication.  Patient denies any hair or skin changes.  No reports of heat or cold intolerance.  Anxiety-with depression.  Patient currently on Ativan 1 mg.  She also continues Wellbutrin.  She reports that she is sleeping well.  She does have multiple stressors.    Menopause on HRT-ongoing.  Patient currently using estradiol 0.1 mcg per 24-hour patch.  She is changing this patch 2 times per week.  She is doing well with symptoms.  She denies any concerns with constipation and is not straining to have BM.  Urine output is good she reports.  She is not having any signs or symptoms of urinary incontinence.  Abnormal glucose-continues Ozempic 2 mg.  She reports that she feels significantly better while she is on the medication.  She is not having fluctuating blood sugars.  No evidence of low blood sugars.  She reports that her appetite does well.  She is not having \"sugar cravings\".    The following portions of the patient's history were reviewed and updated as appropriate: CC, ROS, allergies, current medications, past family history, past medical history, past social history, past surgical history and problem list.      Review of Systems   Constitutional:  Positive for fatigue. Negative for appetite change and fever.   HENT:  Negative for congestion, ear pain, nosebleeds, postnasal drip, rhinorrhea, sore throat, tinnitus, trouble swallowing and voice change.    Eyes:  Negative for blurred vision, photophobia and visual disturbance. " "  Respiratory:  Negative for cough, chest tightness, shortness of breath and wheezing.    Cardiovascular:  Negative for chest pain and palpitations.   Gastrointestinal:  Negative for abdominal pain, blood in stool, constipation, diarrhea, nausea and GERD.   Endocrine: Negative for cold intolerance, heat intolerance and polydipsia.   Genitourinary:  Negative for decreased urine volume, difficulty urinating, dysuria and hematuria.   Musculoskeletal:  Positive for arthralgias, back pain, gait problem and myalgias.   Skin:  Negative for color change, pallor and wound.   Allergic/Immunologic: Negative.    Neurological:  Negative for dizziness, syncope, numbness and headache.   Hematological: Negative.    Psychiatric/Behavioral:  Positive for depressed mood. Negative for decreased concentration, sleep disturbance and suicidal ideas. The patient is not nervous/anxious.    All other systems reviewed and are negative.      Objective     /78   Pulse 65   Resp 12   Ht 154.9 cm (60.98\")   Wt 58.8 kg (129 lb 9.6 oz)   LMP 02/20/2019   SpO2 98%   BMI 24.50 kg/m²     Physical Exam  Vitals reviewed.   Constitutional:       General: She is not in acute distress.     Appearance: She is well-developed. She is not diaphoretic.   HENT:      Head: Normocephalic and atraumatic.      Jaw: No tenderness.      Right Ear: Hearing, tympanic membrane, ear canal and external ear normal.      Left Ear: Hearing, tympanic membrane, ear canal and external ear normal.      Nose: Nose normal. No nasal tenderness or congestion.      Right Sinus: No maxillary sinus tenderness or frontal sinus tenderness.      Left Sinus: No maxillary sinus tenderness or frontal sinus tenderness.      Mouth/Throat:      Lips: Pink.      Mouth: Mucous membranes are moist.      Pharynx: Oropharynx is clear. Uvula midline.   Eyes:      General: Lids are normal. No scleral icterus.     Extraocular Movements:      Right eye: Normal extraocular motion and no " nystagmus.      Left eye: Normal extraocular motion and no nystagmus.      Conjunctiva/sclera: Conjunctivae normal.      Pupils: Pupils are equal, round, and reactive to light.   Neck:      Thyroid: No thyromegaly or thyroid tenderness.      Vascular: No carotid bruit or JVD.      Trachea: No tracheal tenderness.   Cardiovascular:      Rate and Rhythm: Normal rate and regular rhythm.      Pulses:           Dorsalis pedis pulses are 2+ on the right side and 2+ on the left side.        Posterior tibial pulses are 2+ on the right side and 2+ on the left side.      Heart sounds: Normal heart sounds, S1 normal and S2 normal. No murmur heard.  Pulmonary:      Effort: Pulmonary effort is normal. No accessory muscle usage, prolonged expiration or respiratory distress.      Breath sounds: Normal breath sounds.   Chest:      Chest wall: No tenderness.   Abdominal:      General: Bowel sounds are normal. There is no distension.      Palpations: Abdomen is soft. There is no hepatomegaly, splenomegaly or mass.      Tenderness: There is no abdominal tenderness.   Musculoskeletal:         General: Tenderness present.      Cervical back: Normal range of motion and neck supple. Tenderness present.      Thoracic back: Tenderness and bony tenderness present.      Lumbar back: Bony tenderness present.      Right lower leg: No edema.      Left lower leg: No edema.      Comments: No muscular atrophy or flaccidity.  Multiple areas of generalized arthralgia and myalgia noted throughout spine, hips, knees, and shoulders.   Lymphadenopathy:      Head:      Right side of head: No submental or submandibular adenopathy.      Left side of head: No submental or submandibular adenopathy.      Cervical: No cervical adenopathy.      Right cervical: No superficial cervical adenopathy.     Left cervical: No superficial cervical adenopathy.   Skin:     General: Skin is warm and dry.      Capillary Refill: Capillary refill takes less than 2 seconds.       Coloration: Skin is not jaundiced or pale.      Findings: No erythema.      Nails: There is no clubbing.   Neurological:      Mental Status: She is alert and oriented to person, place, and time.      Cranial Nerves: No cranial nerve deficit or facial asymmetry.      Sensory: No sensory deficit.      Motor: Tremor (generalized in extremities) present. No weakness, atrophy or abnormal muscle tone.      Coordination: Coordination normal.      Deep Tendon Reflexes: Reflexes are normal and symmetric.   Psychiatric:         Attention and Perception: She is attentive.         Mood and Affect: Mood is depressed. Mood is not anxious.         Speech: Speech normal.         Behavior: Behavior normal. Behavior is cooperative.         Thought Content: Thought content normal.         Cognition and Memory: Cognition normal.         Judgment: Judgment normal.         Diagnoses and all orders for this visit:    1. Essential hypertension (Primary)  Assessment & Plan:  continue spironolactone 25 mg.   Report any negative side effects.  Ambulatory BP monitoring either at home or random community checks.  Patient to report continued elevations >140/90.  Patient may come by office for checks if needed.         2. Anxiety disorder due to general medical condition with panic attack  Comments:  Continue Wellbutrin and as needed Ativan.  We will add Abilify 2 mg to her medication regimen.  Encourage patient to be active as she is able  Assessment & Plan:  Continue PRN Ativan and Wellbutrin 300 mg daily.    Stress reduction advised (examples:  make time for self, Reading, Listening to music, Exercise, keeping a journal, venting to a confidant, etc.)    Orders:  -     LORazepam (ATIVAN) 1 MG tablet; Take 1 tablet by mouth Daily As Needed for Anxiety.  Dispense: 30 tablet; Refill: 1    3. Essential tremor  Comments:  Continue propranolol.  Patient will report efficacy.  Will titrate as needed  Orders:  -     propranolol (INDERAL) 10 MG tablet;  Take 1 tablet by mouth 3 (Three) Times a Day.  Dispense: 90 tablet; Refill: 2    4. Abnormal glucose  Comments:  Continue Ozempic as directed.  Encouraged a high protein low CHO diet    5. Premature surgical menopause on HRT  Comments:  Continue estradiol patch and Prometrium supplement       BMI is within normal parameters. No other follow-up for BMI required.      Understands disease processes and need for medications.  Understands reasons for urgent and emergent care.  Patient (& family) verbalized agreement for treatment plan.   Emotional support and active listening provided.  Patient provided time to verbalize feelings.    EILEEN/PMDP reviewed today and consistent.  Will refill prescribed controlled medication today.  Patient is aware they cannot receive narcotics from any other provider except if under care of pain management or speciality clinic.  Risk and benefits of medication use has been reviewed.  History and physical exam exhibit continued safe and appropriate use of controlled substances.  The patient is aware of the potential for addiction and dependence.  This patient has been made aware of the appropriate use of such medications, including potential risk of somnolence, limited ability to drive and / or work safely, and potential for overdose.    It has also been made clear that these medications are for use by this patient only, without concomitant use of alcohol or other substances unless prescribed/advised by medical provider.  Patient understands they may be subject to UDS and pill counts at random.    Patient considered to be low risk for addiction due to use of single controlled medications.  Patient understands and accepts these risks.  Patient need for medication will be reassessed at each visit.  Doses will be adjusted according to patient need and findings.    Goal of TX: Patient will not have any adverse reactions of medication. Patient have reduction in anxiety symptoms with use of PRN  Ativan as directed.  Patient will be able to remain active in an outside of home with use of Ativan for anxiety symptoms    EILEEN Patient Controlled Substance Report (from 8/28/2023 to 8/27/2024)    Dispensed  Strength Quantity Days Supply Provider Pharmacy   07/26/2024 Lorazepam 1MG 30 each 30 [facility]   06/17/2024 Lorazepam 1MG 30 each 30 [facility]   05/13/2024 Lorazepam 1MG 30 each 30 [facility]   04/05/2024 Lorazepam 1MG 30 each 30 [facility]        RTC 2 to 3 months, sooner if needed for problems or concerns        This document has been electronically signed by:  ILEANA Vega FNP-C Dragon disclaimer:  Part of this note may be an electronic transcription/translation of spoken language to printed text using the Dragon Dictation System.

## 2024-09-01 NOTE — ASSESSMENT & PLAN NOTE
continue spironolactone 25 mg.   Report any negative side effects.  Ambulatory BP monitoring either at home or random community checks.  Patient to report continued elevations >140/90.  Patient may come by office for checks if needed.

## 2024-09-01 NOTE — ASSESSMENT & PLAN NOTE
Continue PRN Ativan and Wellbutrin 300 mg daily.    Stress reduction advised (examples:  make time for self, Reading, Listening to music, Exercise, keeping a journal, venting to a confidant, etc.)

## 2024-10-07 DIAGNOSIS — F33.1 MODERATE EPISODE OF RECURRENT MAJOR DEPRESSIVE DISORDER: ICD-10-CM

## 2024-10-07 RX ORDER — BUPROPION HYDROCHLORIDE 300 MG/1
300 TABLET ORAL EVERY MORNING
Qty: 90 TABLET | Refills: 1 | Status: SHIPPED | OUTPATIENT
Start: 2024-10-07

## 2024-10-07 RX ORDER — SEMAGLUTIDE 2.68 MG/ML
INJECTION, SOLUTION SUBCUTANEOUS
Qty: 3 ML | Refills: 1 | Status: SHIPPED | OUTPATIENT
Start: 2024-10-07

## 2024-10-14 RX ORDER — POLYETHYLENE GLYCOL 3350 17 G/17G
POWDER, FOR SOLUTION ORAL
Qty: 510 G | Refills: 5 | Status: SHIPPED | OUTPATIENT
Start: 2024-10-14

## 2024-10-28 ENCOUNTER — OFFICE VISIT (OUTPATIENT)
Dept: FAMILY MEDICINE CLINIC | Facility: CLINIC | Age: 49
End: 2024-10-28
Payer: MEDICAID

## 2024-10-28 VITALS
WEIGHT: 126 LBS | DIASTOLIC BLOOD PRESSURE: 70 MMHG | BODY MASS INDEX: 23.79 KG/M2 | SYSTOLIC BLOOD PRESSURE: 130 MMHG | OXYGEN SATURATION: 98 % | HEART RATE: 68 BPM | HEIGHT: 61 IN | TEMPERATURE: 97.6 F

## 2024-10-28 DIAGNOSIS — L98.9 SKIN LESION OF FACE: Primary | ICD-10-CM

## 2024-10-28 DIAGNOSIS — E55.9 VITAMIN D DEFICIENCY DISEASE: ICD-10-CM

## 2024-10-28 DIAGNOSIS — E78.1 PURE HYPERTRIGLYCERIDEMIA: ICD-10-CM

## 2024-10-28 DIAGNOSIS — E53.8 VITAMIN B 12 DEFICIENCY: ICD-10-CM

## 2024-10-28 DIAGNOSIS — F06.4 ANXIETY DISORDER DUE TO GENERAL MEDICAL CONDITION WITH PANIC ATTACK: ICD-10-CM

## 2024-10-28 DIAGNOSIS — I10 ESSENTIAL HYPERTENSION: ICD-10-CM

## 2024-10-28 DIAGNOSIS — R73.09 ABNORMAL GLUCOSE: ICD-10-CM

## 2024-10-28 DIAGNOSIS — E06.9 THYROIDITIS: ICD-10-CM

## 2024-10-28 DIAGNOSIS — R23.2 HOT FLASHES: ICD-10-CM

## 2024-10-28 DIAGNOSIS — F41.0 ANXIETY DISORDER DUE TO GENERAL MEDICAL CONDITION WITH PANIC ATTACK: ICD-10-CM

## 2024-10-28 PROCEDURE — 80050 GENERAL HEALTH PANEL: CPT | Performed by: NURSE PRACTITIONER

## 2024-10-28 PROCEDURE — 83036 HEMOGLOBIN GLYCOSYLATED A1C: CPT | Performed by: NURSE PRACTITIONER

## 2024-10-28 PROCEDURE — 82607 VITAMIN B-12: CPT | Performed by: NURSE PRACTITIONER

## 2024-10-28 PROCEDURE — 1126F AMNT PAIN NOTED NONE PRSNT: CPT | Performed by: NURSE PRACTITIONER

## 2024-10-28 PROCEDURE — 1160F RVW MEDS BY RX/DR IN RCRD: CPT | Performed by: NURSE PRACTITIONER

## 2024-10-28 PROCEDURE — 82306 VITAMIN D 25 HYDROXY: CPT | Performed by: NURSE PRACTITIONER

## 2024-10-28 PROCEDURE — 1159F MED LIST DOCD IN RCRD: CPT | Performed by: NURSE PRACTITIONER

## 2024-10-28 PROCEDURE — 3075F SYST BP GE 130 - 139MM HG: CPT | Performed by: NURSE PRACTITIONER

## 2024-10-28 PROCEDURE — 80061 LIPID PANEL: CPT | Performed by: NURSE PRACTITIONER

## 2024-10-28 PROCEDURE — 3078F DIAST BP <80 MM HG: CPT | Performed by: NURSE PRACTITIONER

## 2024-10-28 PROCEDURE — 84439 ASSAY OF FREE THYROXINE: CPT | Performed by: NURSE PRACTITIONER

## 2024-10-28 PROCEDURE — 99214 OFFICE O/P EST MOD 30 MIN: CPT | Performed by: NURSE PRACTITIONER

## 2024-10-28 RX ORDER — CYANOCOBALAMIN 1000 UG/ML
1000 INJECTION, SOLUTION INTRAMUSCULAR; SUBCUTANEOUS WEEKLY
Qty: 4 ML | Refills: 5 | Status: SHIPPED | OUTPATIENT
Start: 2024-10-28

## 2024-10-28 RX ORDER — LEVOTHYROXINE SODIUM 50 UG/1
50 TABLET ORAL DAILY
Qty: 30 TABLET | Refills: 5 | Status: SHIPPED | OUTPATIENT
Start: 2024-10-28

## 2024-10-28 RX ORDER — PROGESTERONE 100 MG/1
CAPSULE ORAL
Qty: 12 CAPSULE | Refills: 1 | Status: SHIPPED | OUTPATIENT
Start: 2024-10-28

## 2024-10-28 NOTE — PROGRESS NOTES
"Subjective   Ekaterina Nelson is a 49 y.o. female.     Chief Complaint   Patient presents with    Hypertension       History of Present Illness     HTN-ongoing.  On spironolactone 50 mg and lasix 20 mg.  No negative side effects.   Tremors-currently on propranolol 10 mg TID but she is taking BID.  She is tolerating well.  She reports tremors are \"about the same\"  reports that she can tell if she does not take medication.  Tremors are worse with hand movement and use.   Depression with anxiety-on Wellbutrin 300 mg daily.  She reports that she weaned herself off the Ativan.  She reports that she ran out of medication and was without for about a week.  She reports that during the time she was off medication she felt that her symptoms of sadness was less and she felt well.  She resumed medication and noted that after 3-4 days the feelings returned.  She stopped the ativan again and has been off for about 3 weeks.   Areas on face-has noted some small areas on her face.  She reports that they do not get sore and do not have drainage.  She has not changed any of her facial care and they seem to be occurring more frequently.  She has some family history of skin cancer so she would like a skin assessment.        The following portions of the patient's history were reviewed and updated as appropriate: CC, ROS, allergies, current medications, past family history, past medical history, past social history, past surgical history and problem list.      Review of Systems   Constitutional:  Positive for fatigue. Negative for appetite change and fever.   HENT:  Negative for congestion, ear pain, nosebleeds, postnasal drip, rhinorrhea, sore throat, tinnitus, trouble swallowing and voice change.    Eyes:  Negative for blurred vision, photophobia and visual disturbance.   Respiratory:  Negative for cough, chest tightness, shortness of breath and wheezing.    Cardiovascular:  Negative for chest pain and palpitations.   Gastrointestinal:  " "Negative for abdominal pain, blood in stool, constipation, diarrhea, nausea, vomiting and GERD.   Endocrine: Negative for cold intolerance, heat intolerance and polydipsia.   Genitourinary:  Negative for decreased urine volume, difficulty urinating, dysuria and hematuria.   Musculoskeletal:  Positive for arthralgias and myalgias. Negative for back pain and gait problem.   Skin:  Negative for color change, pallor and wound.   Allergic/Immunologic: Negative.    Neurological:  Negative for dizziness, syncope, numbness and headache.   Hematological: Negative.    Psychiatric/Behavioral:  Positive for stress. Negative for decreased concentration, sleep disturbance, suicidal ideas and depressed mood. The patient is not nervous/anxious.    All other systems reviewed and are negative.      Objective     /70   Pulse 68   Temp 97.6 °F (36.4 °C) (Temporal)   Ht 154.9 cm (60.98\")   Wt 57.2 kg (126 lb)   LMP 02/20/2019   SpO2 98%   BMI 23.82 kg/m²     Physical Exam  Vitals reviewed.   Constitutional:       General: She is not in acute distress.     Appearance: She is well-developed. She is not diaphoretic.   HENT:      Head: Normocephalic and atraumatic.      Jaw: No tenderness.      Right Ear: Hearing, tympanic membrane, ear canal and external ear normal.      Left Ear: Hearing, tympanic membrane, ear canal and external ear normal.      Nose: Nose normal. No nasal tenderness or congestion.      Right Sinus: No maxillary sinus tenderness or frontal sinus tenderness.      Left Sinus: No maxillary sinus tenderness or frontal sinus tenderness.      Mouth/Throat:      Lips: Pink.      Mouth: Mucous membranes are moist.      Pharynx: Oropharynx is clear. Uvula midline.   Eyes:      General: Lids are normal. No scleral icterus.     Extraocular Movements:      Right eye: Normal extraocular motion and no nystagmus.      Left eye: Normal extraocular motion and no nystagmus.      Conjunctiva/sclera: Conjunctivae normal.      " Pupils: Pupils are equal, round, and reactive to light.   Neck:      Thyroid: No thyromegaly or thyroid tenderness.      Vascular: No carotid bruit or JVD.      Trachea: No tracheal tenderness.   Cardiovascular:      Rate and Rhythm: Normal rate and regular rhythm.      Pulses:           Dorsalis pedis pulses are 2+ on the right side and 2+ on the left side.        Posterior tibial pulses are 2+ on the right side and 2+ on the left side.      Heart sounds: Normal heart sounds, S1 normal and S2 normal. No murmur heard.  Pulmonary:      Effort: Pulmonary effort is normal. No accessory muscle usage, prolonged expiration or respiratory distress.      Breath sounds: Normal breath sounds.   Chest:      Chest wall: No tenderness.   Abdominal:      General: Bowel sounds are normal. There is no distension.      Palpations: Abdomen is soft. There is no hepatomegaly, splenomegaly or mass.      Tenderness: There is no abdominal tenderness.   Musculoskeletal:         General: Tenderness present.      Cervical back: Normal range of motion and neck supple. Tenderness present.      Thoracic back: Tenderness and bony tenderness present.      Lumbar back: Bony tenderness present.      Right lower leg: No edema.      Left lower leg: No edema.      Comments: No muscular atrophy or flaccidity.  Multiple areas of generalized arthralgia and myalgia noted throughout spine, hips, knees, and shoulders.   Lymphadenopathy:      Head:      Right side of head: No submental or submandibular adenopathy.      Left side of head: No submental or submandibular adenopathy.      Cervical: No cervical adenopathy.      Right cervical: No superficial cervical adenopathy.     Left cervical: No superficial cervical adenopathy.   Skin:     General: Skin is warm and dry.      Capillary Refill: Capillary refill takes less than 2 seconds.      Coloration: Skin is not jaundiced or pale.      Findings: No erythema.      Nails: There is no clubbing.   Neurological:       Mental Status: She is alert and oriented to person, place, and time.      Cranial Nerves: No cranial nerve deficit or facial asymmetry.      Sensory: No sensory deficit.      Motor: Tremor (generalized in extremities) present. No weakness, atrophy or abnormal muscle tone.      Coordination: Coordination normal.      Deep Tendon Reflexes: Reflexes are normal and symmetric.   Psychiatric:         Attention and Perception: She is attentive.         Mood and Affect: Mood is not anxious or depressed.         Speech: Speech normal.         Behavior: Behavior normal. Behavior is cooperative.         Thought Content: Thought content normal.         Cognition and Memory: Cognition normal.         Judgment: Judgment normal.         Diagnoses and all orders for this visit:    1. Skin lesion of face (Primary)  -     Ambulatory Referral to Dermatology    2. Vitamin B 12 deficiency  -     cyanocobalamin 1000 MCG/ML injection; Inject 1 mL into the appropriate muscle as directed by prescriber 1 (One) Time Per Week.  Dispense: 4 mL; Refill: 5  -     Vitamin B12    3. Thyroiditis  Comments:  Suspected due to abnormal labs and history of COVID infection.  We will begin work-up of thyroid  Orders:  -     levothyroxine (SYNTHROID, LEVOTHROID) 50 MCG tablet; Take 1 tablet by mouth Daily.  Dispense: 30 tablet; Refill: 5  -     TSH  -     T4, Free    4. Anxiety disorder due to general medical condition with panic attack  Comments:  Continue Wellbutrin.  Encourage patient to be active as she is able    5. Essential hypertension  -     CBC & Differential  -     Comprehensive Metabolic Panel    6. Abnormal glucose  -     Hemoglobin A1c    7. Pure hypertriglyceridemia  -     Lipid Panel    8. Vitamin D deficiency disease  -     Vitamin D,25-Hydroxy       BMI is within normal parameters. No other follow-up for BMI required.      Understands disease processes and need for medications.  Understands reasons for urgent and emergent care.   Patient (& family) verbalized agreement for treatment plan.   Emotional support and active listening provided.  Patient provided time to verbalize feelings.    EILEEN/USHA reviewed today and consistent.  Will refill prescribed controlled medication today.  Patient is aware they cannot receive narcotics from any other provider except if under care of pain management or speciality clinic.  Risk and benefits of medication use has been reviewed.  History and physical exam exhibit continued safe and appropriate use of controlled substances.  The patient is aware of the potential for addiction and dependence.  This patient has been made aware of the appropriate use of such medications, including potential risk of somnolence, limited ability to drive and / or work safely, and potential for overdose.    It has also been made clear that these medications are for use by this patient only, without concomitant use of alcohol or other substances unless prescribed/advised by medical provider.  Patient understands they may be subject to UDS and pill counts at random.    Patient considered to be low risk for addiction due to use of single controlled medications.  Patient understands and accepts these risks.  Patient need for medication will be reassessed at each visit.  Doses will be adjusted according to patient need and findings.    Goal of TX: Patient will not have any adverse reactions of medication.  Patient have reduction in anxiety symptoms with use of PRN Ativan as directed.  Patient will be able to remain active in an outside of home with use of Ativan for anxiety symptoms  EILEEN Patient Controlled Substance Report (from 10/29/2023 to 10/28/2024)    Dispensed  Strength Quantity Days Supply Provider Pharmacy   10/24/2024 Lorazepam 1MG 30 each 30 [facility]   09/17/2024 Lorazepam 1MG 30 each 30 [facility]   07/26/2024 Lorazepam 1MG 30 each 30 [facility]   06/17/2024 Lorazepam 1MG 30 each 30 [facility]        RTC 2 months, sooner if needed.               This document has been electronically signed by:  ILEANA Vega, FNP-C    Dragon disclaimer:  Part of this note may be an electronic transcription/translation of spoken language to printed text using the Dragon Dictation System.

## 2024-10-29 LAB
25(OH)D3 SERPL-MCNC: 21.4 NG/ML (ref 30–100)
ALBUMIN SERPL-MCNC: 4.4 G/DL (ref 3.5–5.2)
ALBUMIN/GLOB SERPL: 1.5 G/DL
ALP SERPL-CCNC: 60 U/L (ref 39–117)
ALT SERPL W P-5'-P-CCNC: 14 U/L (ref 1–33)
ANION GAP SERPL CALCULATED.3IONS-SCNC: 8 MMOL/L (ref 5–15)
AST SERPL-CCNC: 15 U/L (ref 1–32)
BASOPHILS # BLD AUTO: 0.05 10*3/MM3 (ref 0–0.2)
BASOPHILS NFR BLD AUTO: 0.6 % (ref 0–1.5)
BILIRUB SERPL-MCNC: 0.5 MG/DL (ref 0–1.2)
BUN SERPL-MCNC: 12 MG/DL (ref 6–20)
BUN/CREAT SERPL: 10.8 (ref 7–25)
CALCIUM SPEC-SCNC: 9.4 MG/DL (ref 8.6–10.5)
CHLORIDE SERPL-SCNC: 102 MMOL/L (ref 98–107)
CHOLEST SERPL-MCNC: 125 MG/DL (ref 0–200)
CO2 SERPL-SCNC: 27 MMOL/L (ref 22–29)
CREAT SERPL-MCNC: 1.11 MG/DL (ref 0.57–1)
DEPRECATED RDW RBC AUTO: 38.4 FL (ref 37–54)
EGFRCR SERPLBLD CKD-EPI 2021: 60.3 ML/MIN/1.73
EOSINOPHIL # BLD AUTO: 0.03 10*3/MM3 (ref 0–0.4)
EOSINOPHIL NFR BLD AUTO: 0.4 % (ref 0.3–6.2)
ERYTHROCYTE [DISTWIDTH] IN BLOOD BY AUTOMATED COUNT: 11.8 % (ref 12.3–15.4)
GLOBULIN UR ELPH-MCNC: 2.9 GM/DL
GLUCOSE SERPL-MCNC: 53 MG/DL (ref 65–99)
HBA1C MFR BLD: 4.5 % (ref 4.8–5.6)
HCT VFR BLD AUTO: 41.6 % (ref 34–46.6)
HDLC SERPL-MCNC: 41 MG/DL (ref 40–60)
HGB BLD-MCNC: 14.4 G/DL (ref 12–15.9)
IMM GRANULOCYTES # BLD AUTO: 0.02 10*3/MM3 (ref 0–0.05)
IMM GRANULOCYTES NFR BLD AUTO: 0.2 % (ref 0–0.5)
LDLC SERPL CALC-MCNC: 67 MG/DL (ref 0–100)
LDLC/HDLC SERPL: 1.63 {RATIO}
LYMPHOCYTES # BLD AUTO: 2.5 10*3/MM3 (ref 0.7–3.1)
LYMPHOCYTES NFR BLD AUTO: 29.7 % (ref 19.6–45.3)
MCH RBC QN AUTO: 31 PG (ref 26.6–33)
MCHC RBC AUTO-ENTMCNC: 34.6 G/DL (ref 31.5–35.7)
MCV RBC AUTO: 89.5 FL (ref 79–97)
MONOCYTES # BLD AUTO: 0.66 10*3/MM3 (ref 0.1–0.9)
MONOCYTES NFR BLD AUTO: 7.8 % (ref 5–12)
NEUTROPHILS NFR BLD AUTO: 5.17 10*3/MM3 (ref 1.7–7)
NEUTROPHILS NFR BLD AUTO: 61.3 % (ref 42.7–76)
NRBC BLD AUTO-RTO: 0 /100 WBC (ref 0–0.2)
PLATELET # BLD AUTO: 335 10*3/MM3 (ref 140–450)
PMV BLD AUTO: 11.3 FL (ref 6–12)
POTASSIUM SERPL-SCNC: 4 MMOL/L (ref 3.5–5.2)
PROT SERPL-MCNC: 7.3 G/DL (ref 6–8.5)
RBC # BLD AUTO: 4.65 10*6/MM3 (ref 3.77–5.28)
SODIUM SERPL-SCNC: 137 MMOL/L (ref 136–145)
T4 FREE SERPL-MCNC: 1.53 NG/DL (ref 0.92–1.68)
TRIGL SERPL-MCNC: 85 MG/DL (ref 0–150)
TSH SERPL DL<=0.05 MIU/L-ACNC: 1.59 UIU/ML (ref 0.27–4.2)
VIT B12 BLD-MCNC: 350 PG/ML (ref 211–946)
VLDLC SERPL-MCNC: 17 MG/DL (ref 5–40)
WBC NRBC COR # BLD AUTO: 8.43 10*3/MM3 (ref 3.4–10.8)

## 2024-11-13 DIAGNOSIS — F41.0 ANXIETY DISORDER DUE TO GENERAL MEDICAL CONDITION WITH PANIC ATTACK: ICD-10-CM

## 2024-11-13 DIAGNOSIS — F06.4 ANXIETY DISORDER DUE TO GENERAL MEDICAL CONDITION WITH PANIC ATTACK: ICD-10-CM

## 2024-11-13 DIAGNOSIS — E06.9 THYROIDITIS: ICD-10-CM

## 2024-11-13 RX ORDER — LORAZEPAM 1 MG/1
1 TABLET ORAL DAILY PRN
Qty: 30 TABLET | Refills: 1 | Status: SHIPPED | OUTPATIENT
Start: 2024-11-13

## 2024-11-13 RX ORDER — LEVOTHYROXINE SODIUM 50 UG/1
50 TABLET ORAL DAILY
Qty: 30 TABLET | Refills: 5 | Status: SHIPPED | OUTPATIENT
Start: 2024-11-13

## 2024-11-14 DIAGNOSIS — E55.9 VITAMIN D DEFICIENCY DISEASE: ICD-10-CM

## 2024-11-14 RX ORDER — ERGOCALCIFEROL 1.25 MG/1
50000 CAPSULE, LIQUID FILLED ORAL WEEKLY
Qty: 12 CAPSULE | Refills: 2 | Status: SHIPPED | OUTPATIENT
Start: 2024-11-14

## 2024-12-02 DIAGNOSIS — R23.2 HOT FLASHES: ICD-10-CM

## 2024-12-02 RX ORDER — ESTRADIOL 0.1 MG/D
1 FILM, EXTENDED RELEASE TRANSDERMAL 2 TIMES WEEKLY
Qty: 8 PATCH | Refills: 2 | Status: SHIPPED | OUTPATIENT
Start: 2024-12-02

## 2024-12-03 RX ORDER — NITROFURANTOIN 25; 75 MG/1; MG/1
100 CAPSULE ORAL EVERY 12 HOURS SCHEDULED
Qty: 20 CAPSULE | Refills: 0 | Status: SHIPPED | OUTPATIENT
Start: 2024-12-03

## 2024-12-18 RX ORDER — SEMAGLUTIDE 2.68 MG/ML
INJECTION, SOLUTION SUBCUTANEOUS
Qty: 3 ML | Refills: 1 | Status: SHIPPED | OUTPATIENT
Start: 2024-12-18

## 2024-12-30 DIAGNOSIS — R23.2 HOT FLASHES: ICD-10-CM

## 2024-12-30 RX ORDER — PROGESTERONE 100 MG/1
CAPSULE ORAL
Qty: 12 CAPSULE | Refills: 1 | Status: SHIPPED | OUTPATIENT
Start: 2024-12-30

## 2025-01-02 DIAGNOSIS — G25.0 ESSENTIAL TREMOR: ICD-10-CM

## 2025-01-02 RX ORDER — PROPRANOLOL HYDROCHLORIDE 10 MG/1
10 TABLET ORAL 2 TIMES DAILY
Qty: 60 TABLET | Refills: 5 | Status: SHIPPED | OUTPATIENT
Start: 2025-01-02

## 2025-01-02 RX ORDER — CLOBETASOL PROPIONATE 0.05 G/100ML
SHAMPOO TOPICAL
Qty: 118 ML | Refills: 5 | Status: SHIPPED | OUTPATIENT
Start: 2025-01-02

## 2025-01-03 DIAGNOSIS — E28.2 PCOS (POLYCYSTIC OVARIAN SYNDROME): ICD-10-CM

## 2025-01-03 RX ORDER — SPIRONOLACTONE 50 MG/1
50 TABLET, FILM COATED ORAL DAILY
Qty: 30 TABLET | Refills: 5 | Status: SHIPPED | OUTPATIENT
Start: 2025-01-03

## 2025-01-13 DIAGNOSIS — I10 ESSENTIAL HYPERTENSION: ICD-10-CM

## 2025-01-13 RX ORDER — OMEPRAZOLE 40 MG/1
40 CAPSULE, DELAYED RELEASE ORAL DAILY
Qty: 90 CAPSULE | Refills: 1 | Status: SHIPPED | OUTPATIENT
Start: 2025-01-13

## 2025-01-13 RX ORDER — LISINOPRIL 20 MG/1
20 TABLET ORAL DAILY
Qty: 90 TABLET | Refills: 2 | Status: SHIPPED | OUTPATIENT
Start: 2025-01-13

## 2025-02-03 RX ORDER — POLYETHYLENE GLYCOL 3350 17 G/17G
POWDER, FOR SOLUTION ORAL
Qty: 510 G | Refills: 5 | Status: SHIPPED | OUTPATIENT
Start: 2025-02-03

## 2025-02-12 DIAGNOSIS — E78.1 PURE HYPERTRIGLYCERIDEMIA: ICD-10-CM

## 2025-02-12 RX ORDER — ROSUVASTATIN CALCIUM 20 MG/1
20 TABLET, COATED ORAL DAILY
Qty: 90 TABLET | Refills: 0 | Status: SHIPPED | OUTPATIENT
Start: 2025-02-12

## 2025-02-12 RX ORDER — NITROFURANTOIN 25; 75 MG/1; MG/1
100 CAPSULE ORAL EVERY 12 HOURS
Qty: 20 CAPSULE | Refills: 0 | Status: SHIPPED | OUTPATIENT
Start: 2025-02-12

## 2025-02-17 RX ORDER — SEMAGLUTIDE 2.68 MG/ML
INJECTION, SOLUTION SUBCUTANEOUS
Qty: 3 ML | Refills: 1 | Status: SHIPPED | OUTPATIENT
Start: 2025-02-17

## 2025-02-18 DIAGNOSIS — I25.10 CORONARY ARTERY DISEASE INVOLVING NATIVE CORONARY ARTERY OF NATIVE HEART WITHOUT ANGINA PECTORIS: ICD-10-CM

## 2025-02-18 RX ORDER — ASPIRIN 81 MG/1
81 TABLET, COATED ORAL DAILY
Qty: 90 TABLET | Refills: 2 | Status: SHIPPED | OUTPATIENT
Start: 2025-02-18

## 2025-02-18 RX ORDER — CLOPIDOGREL BISULFATE 75 MG/1
75 TABLET ORAL DAILY
Qty: 90 TABLET | Refills: 2 | Status: SHIPPED | OUTPATIENT
Start: 2025-02-18